# Patient Record
Sex: MALE | Race: BLACK OR AFRICAN AMERICAN
[De-identification: names, ages, dates, MRNs, and addresses within clinical notes are randomized per-mention and may not be internally consistent; named-entity substitution may affect disease eponyms.]

---

## 2018-05-25 ENCOUNTER — HOSPITAL ENCOUNTER (EMERGENCY)
Dept: HOSPITAL 61 - ER | Age: 78
Discharge: HOME | End: 2018-05-25
Payer: COMMERCIAL
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
## 2018-05-25 DIAGNOSIS — T75.4XXA: Primary | ICD-10-CM

## 2018-05-25 DIAGNOSIS — R20.2: ICD-10-CM

## 2018-05-25 PROCEDURE — 99283 EMERGENCY DEPT VISIT LOW MDM: CPT

## 2018-05-25 PROCEDURE — 93005 ELECTROCARDIOGRAM TRACING: CPT

## 2020-01-04 ENCOUNTER — HOSPITAL ENCOUNTER (INPATIENT)
Dept: HOSPITAL 61 - ER | Age: 80
LOS: 5 days | Discharge: HOME HEALTH | DRG: 291 | End: 2020-01-09
Attending: FAMILY MEDICINE | Admitting: FAMILY MEDICINE
Payer: COMMERCIAL

## 2020-01-04 VITALS — BODY MASS INDEX: 19.06 KG/M2 | HEIGHT: 71 IN | WEIGHT: 136.13 LBS

## 2020-01-04 VITALS — DIASTOLIC BLOOD PRESSURE: 93 MMHG | SYSTOLIC BLOOD PRESSURE: 166 MMHG

## 2020-01-04 VITALS — SYSTOLIC BLOOD PRESSURE: 156 MMHG | DIASTOLIC BLOOD PRESSURE: 89 MMHG

## 2020-01-04 DIAGNOSIS — J98.11: ICD-10-CM

## 2020-01-04 DIAGNOSIS — I42.8: ICD-10-CM

## 2020-01-04 DIAGNOSIS — I16.0: ICD-10-CM

## 2020-01-04 DIAGNOSIS — M19.90: ICD-10-CM

## 2020-01-04 DIAGNOSIS — Z87.891: ICD-10-CM

## 2020-01-04 DIAGNOSIS — E78.5: ICD-10-CM

## 2020-01-04 DIAGNOSIS — Z82.5: ICD-10-CM

## 2020-01-04 DIAGNOSIS — I25.5: ICD-10-CM

## 2020-01-04 DIAGNOSIS — J44.9: ICD-10-CM

## 2020-01-04 DIAGNOSIS — I48.91: ICD-10-CM

## 2020-01-04 DIAGNOSIS — I25.10: ICD-10-CM

## 2020-01-04 DIAGNOSIS — I70.0: ICD-10-CM

## 2020-01-04 DIAGNOSIS — I11.0: Primary | ICD-10-CM

## 2020-01-04 DIAGNOSIS — I50.43: ICD-10-CM

## 2020-01-04 DIAGNOSIS — Z95.0: ICD-10-CM

## 2020-01-04 DIAGNOSIS — Z85.028: ICD-10-CM

## 2020-01-04 DIAGNOSIS — Z82.49: ICD-10-CM

## 2020-01-04 DIAGNOSIS — I47.2: ICD-10-CM

## 2020-01-04 DIAGNOSIS — J96.01: ICD-10-CM

## 2020-01-04 DIAGNOSIS — I49.5: ICD-10-CM

## 2020-01-04 DIAGNOSIS — E83.51: ICD-10-CM

## 2020-01-04 LAB
ALBUMIN SERPL-MCNC: 3.2 G/DL (ref 3.4–5)
ALBUMIN/GLOB SERPL: 0.7 {RATIO} (ref 1–1.7)
ALP SERPL-CCNC: 122 U/L (ref 46–116)
ALT SERPL-CCNC: 22 U/L (ref 16–63)
ANION GAP SERPL CALC-SCNC: 12 MMOL/L (ref 6–14)
APTT BLD: 37 SEC (ref 24–38)
AST SERPL-CCNC: 30 U/L (ref 15–37)
BASOPHILS # BLD AUTO: 0 X10^3/UL (ref 0–0.2)
BASOPHILS NFR BLD: 1 % (ref 0–3)
BILIRUB SERPL-MCNC: 0.3 MG/DL (ref 0.2–1)
BUN SERPL-MCNC: 18 MG/DL (ref 8–26)
BUN/CREAT SERPL: 16 (ref 6–20)
CALCIUM SERPL-MCNC: 7.2 MG/DL (ref 8.5–10.1)
CHLORIDE SERPL-SCNC: 104 MMOL/L (ref 98–107)
CK SERPL-CCNC: 664 U/L (ref 39–308)
CO2 SERPL-SCNC: 26 MMOL/L (ref 21–32)
CREAT SERPL-MCNC: 1.1 MG/DL (ref 0.7–1.3)
EOSINOPHIL NFR BLD: 0 X10^3/UL (ref 0–0.7)
EOSINOPHIL NFR BLD: 1 % (ref 0–3)
ERYTHROCYTE [DISTWIDTH] IN BLOOD BY AUTOMATED COUNT: 16.1 % (ref 11.5–14.5)
GFR SERPLBLD BASED ON 1.73 SQ M-ARVRAT: 78.1 ML/MIN
GLOBULIN SER-MCNC: 4.4 G/DL (ref 2.2–3.8)
GLUCOSE SERPL-MCNC: 137 MG/DL (ref 70–99)
HCT VFR BLD CALC: 39.4 % (ref 39–53)
HGB BLD-MCNC: 12.5 G/DL (ref 13–17.5)
LIPASE: 78 U/L (ref 73–393)
LYMPHOCYTES # BLD: 0.7 X10^3/UL (ref 1–4.8)
LYMPHOCYTES NFR BLD AUTO: 14 % (ref 24–48)
MAGNESIUM SERPL-MCNC: 2 MG/DL (ref 1.8–2.4)
MCH RBC QN AUTO: 26 PG (ref 25–35)
MCHC RBC AUTO-ENTMCNC: 32 G/DL (ref 31–37)
MCV RBC AUTO: 82 FL (ref 79–100)
MONO #: 0.5 X10^3/UL (ref 0–1.1)
MONOCYTES NFR BLD: 10 % (ref 0–9)
NEUT #: 3.8 X10^3/UL (ref 1.8–7.7)
NEUTROPHILS NFR BLD AUTO: 74 % (ref 31–73)
PLATELET # BLD AUTO: 259 X10^3/UL (ref 140–400)
POTASSIUM SERPL-SCNC: 3.4 MMOL/L (ref 3.5–5.1)
PROT SERPL-MCNC: 7.6 G/DL (ref 6.4–8.2)
PROTHROMBIN TIME: 14.5 SEC (ref 11.7–14)
RBC # BLD AUTO: 4.83 X10^6/UL (ref 4.3–5.7)
SODIUM SERPL-SCNC: 142 MMOL/L (ref 136–145)
WBC # BLD AUTO: 5.1 X10^3/UL (ref 4–11)

## 2020-01-04 PROCEDURE — 94760 N-INVAS EAR/PLS OXIMETRY 1: CPT

## 2020-01-04 PROCEDURE — 82310 ASSAY OF CALCIUM: CPT

## 2020-01-04 PROCEDURE — 84443 ASSAY THYROID STIM HORMONE: CPT

## 2020-01-04 PROCEDURE — 83690 ASSAY OF LIPASE: CPT

## 2020-01-04 PROCEDURE — 83880 ASSAY OF NATRIURETIC PEPTIDE: CPT

## 2020-01-04 PROCEDURE — 85730 THROMBOPLASTIN TIME PARTIAL: CPT

## 2020-01-04 PROCEDURE — 82553 CREATINE MB FRACTION: CPT

## 2020-01-04 PROCEDURE — 83615 LACTATE (LD) (LDH) ENZYME: CPT

## 2020-01-04 PROCEDURE — 85610 PROTHROMBIN TIME: CPT

## 2020-01-04 PROCEDURE — 71045 X-RAY EXAM CHEST 1 VIEW: CPT

## 2020-01-04 PROCEDURE — 80061 LIPID PANEL: CPT

## 2020-01-04 PROCEDURE — 80048 BASIC METABOLIC PNL TOTAL CA: CPT

## 2020-01-04 PROCEDURE — 88305 TISSUE EXAM BY PATHOLOGIST: CPT

## 2020-01-04 PROCEDURE — 96374 THER/PROPH/DIAG INJ IV PUSH: CPT

## 2020-01-04 PROCEDURE — 87075 CULTR BACTERIA EXCEPT BLOOD: CPT

## 2020-01-04 PROCEDURE — 87071 CULTURE AEROBIC QUANT OTHER: CPT

## 2020-01-04 PROCEDURE — 32555 ASPIRATE PLEURA W/ IMAGING: CPT

## 2020-01-04 PROCEDURE — 84157 ASSAY OF PROTEIN OTHER: CPT

## 2020-01-04 PROCEDURE — 83986 ASSAY PH BODY FLUID NOS: CPT

## 2020-01-04 PROCEDURE — 94640 AIRWAY INHALATION TREATMENT: CPT

## 2020-01-04 PROCEDURE — 88112 CYTOPATH CELL ENHANCE TECH: CPT

## 2020-01-04 PROCEDURE — 84484 ASSAY OF TROPONIN QUANT: CPT

## 2020-01-04 PROCEDURE — 36415 COLL VENOUS BLD VENIPUNCTURE: CPT

## 2020-01-04 PROCEDURE — 80053 COMPREHEN METABOLIC PANEL: CPT

## 2020-01-04 PROCEDURE — 93306 TTE W/DOPPLER COMPLETE: CPT

## 2020-01-04 PROCEDURE — 93005 ELECTROCARDIOGRAM TRACING: CPT

## 2020-01-04 PROCEDURE — G0378 HOSPITAL OBSERVATION PER HR: HCPCS

## 2020-01-04 PROCEDURE — 83735 ASSAY OF MAGNESIUM: CPT

## 2020-01-04 PROCEDURE — 85025 COMPLETE CBC W/AUTO DIFF WBC: CPT

## 2020-01-04 PROCEDURE — 87116 MYCOBACTERIA CULTURE: CPT

## 2020-01-04 PROCEDURE — 83605 ASSAY OF LACTIC ACID: CPT

## 2020-01-04 RX ADMIN — CARVEDILOL SCH MG: 3.12 TABLET, FILM COATED ORAL at 20:19

## 2020-01-04 RX ADMIN — IPRATROPIUM BROMIDE AND ALBUTEROL SULFATE SCH ML: .5; 3 SOLUTION RESPIRATORY (INHALATION) at 20:41

## 2020-01-04 RX ADMIN — FUROSEMIDE SCH MG: 10 INJECTION, SOLUTION INTRAMUSCULAR; INTRAVENOUS at 14:00

## 2020-01-04 RX ADMIN — IPRATROPIUM BROMIDE AND ALBUTEROL SULFATE SCH ML: .5; 3 SOLUTION RESPIRATORY (INHALATION) at 15:19

## 2020-01-04 RX ADMIN — APIXABAN SCH MG: 2.5 TABLET, FILM COATED ORAL at 20:19

## 2020-01-04 RX ADMIN — LISINOPRIL SCH MG: 5 TABLET ORAL at 20:20

## 2020-01-04 RX ADMIN — ATORVASTATIN CALCIUM SCH MG: 40 TABLET, FILM COATED ORAL at 20:19

## 2020-01-04 RX ADMIN — TAMSULOSIN HYDROCHLORIDE SCH MG: 0.4 CAPSULE ORAL at 20:19

## 2020-01-04 NOTE — RAD
EXAM: CHEST ONE VIEW.

 

HISTORY: Shortness of breath, chest pain.

 

COMPARISON: 09/21/2005.

 

FINDINGS: A frontal view of the chest is obtained. A left-sided pacemaker 

has its leads in the right atrium and right ventricle.

 

There are moderate right and small left pleural effusions. The inspiration

is small. Interstitial infiltrates are consistent with mild pulmonary 

edema and atelectasis. There is no pneumothorax. The heart is not 

enlarged. There are atherosclerotic calcifications of the aorta. There are

surgical clips at the hiatus.

 

IMPRESSION: 

1. Moderate right and small left pleural effusions. Mild pulmonary edema.

 

Electronically signed by: MALIKA Zhang MD (1/4/2020 11:33 AM) [facility]

## 2020-01-04 NOTE — PHYS DOC
Past Medical History


Past Medical History:  Heart Disease, Hypertension, Other


Additional Past Medical Histor:  BRADYCARDIA


Past Surgical History:  Pacemaker, Other


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


Additional Information:  


Pt states he smokes about 1pk every month


Alcohol Use:  None


Drug Use:  None





Adult General


Chief Complaint


Chief Complaint:  SHORTNESS OF BREATH





HPI


HPI





Patient is a 79 year old male with past medical history of CAD and COPD who 

presents with one-week of increasing shortness of breath. Patient reports 

feeling like he can't lay flat and breathe well at night which has been ongoing 

for months. The patient denies any fevers, chills, nausea, vomiting at this 

time. He admits to one week of diarrhea that he believes began after eating a 

meal [place] night. He also admits to a dry cough that has become somewhat 

worse in the past week. He denies any chest pain, palpitations, dizziness, or 

lightheadedness. He is not on any oxygen at home and does not use an inhaler for

his shortness of breath. He believes the swelling in his legs is slightly worse 

than normal at this time





Review of Systems


Review of Systems


Constitutional: Denies fever or chills 


Eyes: Denies redness or eye pain 


HENT: Denies nasal congestion or sore throat


Respiratory: Reports cough and shortness of breath 


Cardiovascular: Denies chest pain or palpitations


GI: Reports mild abdominal pain, but no nausea, or vomiting


: Denies dysuria or hematuria


Musculoskeletal: Denies back pain or joint pain


Integument: Denies rash or skin lesions 


Neurologic: Denies headache, focal weakness or sensory changes





Complete systems were reviewed and found to be within normal limits, except as 

documented in this note.





Current Medications


Current Medications





Current Medications








 Medications


  (Trade)  Dose


 Ordered  Sig/Reyna  Start Time


 Stop Time Status Last Admin


Dose Admin


 


 Acetaminophen


  (Tylenol)  650 mg  PRN Q4HRS  PRN  1/4/20 12:45


 1/5/20 12:44   





 


 Albuterol/


 Ipratropium


  (Duoneb)  3 ml  1X  ONCE  1/4/20 11:30


 1/4/20 11:31 DC 1/4/20 11:29


3 ML


 


 Aspirin


  (Roz Aspirin)  325 mg  1X  ONCE  1/4/20 11:30


 1/4/20 11:31 DC 1/4/20 11:30


325 MG


 


 Furosemide


  (Lasix)  40 mg  1X  ONCE  1/4/20 12:30


 1/4/20 12:31 DC 1/4/20 12:48


40 MG


 


 Nitroglycerin


  (Nitro-Bid Oint)  0.5 inch  1X  ONCE  1/4/20 11:30


 1/4/20 11:31 DC 1/4/20 11:30


0.5 INCH


 


 Ondansetron HCl


  (Zofran)  4 mg  PRN Q8HRS  PRN  1/4/20 12:45


 1/5/20 12:44   





 


 Potassium Chloride


  (Klor-Con)  40 meq  1X  ONCE  1/4/20 12:30


 1/4/20 12:31 DC 1/4/20 12:48


40 MEQ











Allergies


Allergies





Allergies








Coded Allergies Type Severity Reaction Last Updated Verified


 


  No Known Drug Allergies    5/25/18 No











Physical Exam


Physical Exam


Constitutional: Thin, 78yo male in respiratory distress.


HENT: Normocephalic, atraumatic, oropharynx moist


Eyes: conjunctiva normal, no discharge


Cardiovascular: Heart rate normal, regular rhythm


Lungs & Thorax: Diffuse wheezing b/l. Crackles in lung bases b/l.


Abdomen: Soft, no tenderness. 


Skin: Warm, dry, no erythema, no rash


Extremities: No tenderness, ROM intact. Mild pitting edema in LE's b/l.


Neurologic: Alert and oriented X 3, normal motor function, normal sensory 

function, no focal deficits noted


Psychologic: Affect normal, judgement normal, mood normal





Current Patient Data


Vital Signs





                                   Vital Signs








  Date Time  Temp Pulse Resp B/P (MAP) Pulse Ox O2 Delivery O2 Flow Rate FiO2


 


1/4/20 12:45  82 26 198/90 (126) 95 Nasal Cannula 2.5 


 


1/4/20 11:15 97.7       





 97.7       








Lab Values





                                Laboratory Tests








Test


 1/4/20


11:12


 


White Blood Count


 5.1 x10^3/uL


(4.0-11.0)


 


Red Blood Count


 4.83 x10^6/uL


(4.30-5.70)


 


Hemoglobin


 12.5 g/dL


(13.0-17.5)  L


 


Hematocrit


 39.4 %


(39.0-53.0)


 


Mean Corpuscular Volume


 82 fL ()





 


Mean Corpuscular Hemoglobin 26 pg (25-35)  


 


Mean Corpuscular Hemoglobin


Concent 32 g/dL


(31-37)


 


Red Cell Distribution Width


 16.1 %


(11.5-14.5)  H


 


Platelet Count


 259 x10^3/uL


(140-400)


 


Neutrophils (%) (Auto) 74 % (31-73)  H


 


Lymphocytes (%) (Auto) 14 % (24-48)  L


 


Monocytes (%) (Auto) 10 % (0-9)  H


 


Eosinophils (%) (Auto) 1 % (0-3)  


 


Basophils (%) (Auto) 1 % (0-3)  


 


Neutrophils # (Auto)


 3.8 x10^3/uL


(1.8-7.7)


 


Lymphocytes # (Auto)


 0.7 x10^3/uL


(1.0-4.8)  L


 


Monocytes # (Auto)


 0.5 x10^3/uL


(0.0-1.1)


 


Eosinophils # (Auto)


 0.0 x10^3/uL


(0.0-0.7)


 


Basophils # (Auto)


 0.0 x10^3/uL


(0.0-0.2)


 


Prothrombin Time


 14.5 SEC


(11.7-14.0)  H


 


Prothrombin Time INR


 1.2 (0.8-1.1)


H


 


Activated Partial


Thromboplast Time 37 SEC (24-38)





 


Sodium Level


 142 mmol/L


(136-145)


 


Potassium Level


 3.4 mmol/L


(3.5-5.1)  L


 


Chloride Level


 104 mmol/L


()


 


Carbon Dioxide Level


 26 mmol/L


(21-32)


 


Anion Gap 12 (6-14)  


 


Blood Urea Nitrogen


 18 mg/dL


(8-26)


 


Creatinine


 1.1 mg/dL


(0.7-1.3)


 


Estimated GFR


(Cockcroft-Gault) 78.1  





 


BUN/Creatinine Ratio 16 (6-20)  


 


Glucose Level


 137 mg/dL


(70-99)  H


 


Lactic Acid Level


 2.0 mmol/L


(0.4-2.0)


 


Calcium Level


 7.2 mg/dL


(8.5-10.1)  L


 


Magnesium Level


 2.0 mg/dL


(1.8-2.4)


 


Total Bilirubin


 0.3 mg/dL


(0.2-1.0)


 


Aspartate Amino Transferase


(AST) 30 U/L (15-37)





 


Alanine Aminotransferase (ALT)


 22 U/L (16-63)





 


Alkaline Phosphatase


 122 U/L


()  H


 


Creatine Kinase


 664 U/L


()  H


 


Creatine Kinase MB (Mass)


 7.1 ng/mL


(0.0-3.6)  H


 


Creatine Kinase MB Relative


Index 1.1 % (0-4)  





 


Troponin I Quantitative


 < 0.017 ng/mL


(0.000-0.055)


 


NT-Pro-B-Type Natriuretic


Peptide 83383 pg/mL


(0-449)  H


 


Total Protein


 7.6 g/dL


(6.4-8.2)


 


Albumin


 3.2 g/dL


(3.4-5.0)  L


 


Albumin/Globulin Ratio


 0.7 (1.0-1.7)


L


 


Lipase


 78 U/L


()





                                Laboratory Tests


1/4/20 11:12








                                Laboratory Tests


1/4/20 11:12











EKG


EKG


01/04/2020 @11:01 shows Sinus rhythm with Irregular rhythm at 88bpm. No ST 

elevations, depressions present. No significant differences when compared with 

prior EKG obtained on 05/25/3018.[]





Radiology/Procedures


Radiology/Procedures


PROCEDURE: CHEST AP ONLY





EXAM: CHEST ONE VIEW.


 


HISTORY: Shortness of breath, chest pain.


 


COMPARISON: 09/21/2005.


 


FINDINGS: A frontal view of the chest is obtained. A left-sided pacemaker 


has its leads in the right atrium and right ventricle.


 


There are moderate right and small left pleural effusions. The inspiration


is small. Interstitial infiltrates are consistent with mild pulmonary 


edema and atelectasis. There is no pneumothorax. The heart is not 


enlarged. There are atherosclerotic calcifications of the aorta. There are


surgical clips at the hiatus.


 


IMPRESSION: 


1. Moderate right and small left pleural effusions. Mild pulmonary edema.


 


Electronically signed by: MALIKA Zhang MD (1/4/2020 11:33 AM) [facility]





Course & Med Decision Making


Course & Med Decision Making


Patient is a 79-year-old male with past medical history of COPD, and CAD who 

presents with shortness of breath ongoing for 1 month. The patient was in tripod

 position and satting around 88% oxygen on room air upon arrival. Here he states

 that he is not on any oxygen at home and does not take any medications for his 

COPD. He is not complaining of any chest pain but does admit to slightly 

increased lower extremity edema. Breathing treatment was administered, along 

with dexamethasone 10mg. Chest x-ray showed pleural effusions and moderate 

amounts of fluid buildup, due to CHF. EKG was not concerning. Labs showed 

hypokalemia. Once creatinine was found to be normal, IV Lasix  was started. 

Breathing improved somewhat with duoneb and steroids, but was still subjectively

 SOB after these interventions. Patient requiring admission for further 

evaluation and treatment. Discussed with Dr. Fong who is in agreement with 

admission. Discussed findings and plan with patient and family, who acknowledge 

understanding and agreement.





Dragon Disclaimer


Dragon Disclaimer


This electronic medical record was generated, in whole or in part, using a voice

 recognition dictation system.





Departure


Departure


Impression:  


   Primary Impression:  


   CHF exacerbation


   Additional Impressions:  


   Hypoxia


   Hypokalemia


Disposition:  09 ADMITTED AS INPATIENT


Admitting Physician:  EHSAN (Ernesto Fong)


Condition:  GUARDED


Referrals:  


UNKNOWN PCP NAME (PCP)





Problem Qualifiers








   Primary Impression:  


   CHF exacerbation


   Heart failure type:  unspecified  Qualified Codes:  I50.9 - Heart failure, 

   unspecified








DEEDEE MOCK DO              Jan 4, 2020 11:33

## 2020-01-04 NOTE — EKG
[facility]

              [address] [place] [address]

Test Date:    2020               Test Time:    11:01:24

Pat Name:     ABHIJIT GILBERT       Department:   

Patient ID:   [id number]           Room:          

Gender:       M                        Technician:   

:          1940               Requested By: DEEDEE MOCK

Order Number: [id number]           Reading MD:     

                                 Measurements

Intervals                              Axis          

Rate:         88                       P:            

NV:                                    QRS:          68

QRSD:         82                       T:            -152

QT:           402                                    

QTc:          490                                    

                           Interpretive Statements

IRREGULAR RHYTHM, NO P-WAVE FOUND

LVH WITH REPOLARIZATION ABNORMALITY

PROLONGED QT

ABNORMAL ECG

RI6.01

No previous ECG available for comparison

## 2020-01-04 NOTE — PDOC1
History and Physical


Date of Admission


Date of Admission


DATE: 20 


TIME: 13:04





Identification/Chief Complaint


Chief Complaint


 seen in er  in mild distress, leaning ford to catch breath 79 year old male 

with past medical history of CAD and COPD who presents with one-week of 

increasing shortness of breath





. Patient reports feeling like he can't lay flat and breathe well at night which

has been ongoing for months. The patient denies any fevers, chills, nausea, 

vomiting at this time. He admits to one week of diarrhea that he believes began 

after eating a meal [place] night.





 He also admits to a dry cough that has become somewhat worse in the past week. 





denies any chest pain, palpitations, dizziness, or lightheadedness. He is not on

any oxygen at home and does not use an inhaler for his shortness of breath. He 

believes the swelling in his legs is slightly worse than normal at this time





Past Medical History


Past Medical History


                                                            


 Past Medical History 


Past Medical History


Past Medical History:  Heart Disease, Hypertension, Other


Additional Past Medical Histor:  BRADYCARDIA


Past Surgical History:  Pacemaker, Other


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


Additional Information:  


Pt states he smokes about 1pk every month,  stopped in 2019


Alcohol Use:  None


Drug Use:  None








FHX HTN


Cardiovascular:  CAD, CHF


Pulmonary:  COPD


Musculoskeletal:  Osteoarthritis





Family History


Family History:  High Cholestrol, Hypertension





Social History


Smoke:  Quit


ALCOHOL:  none


Drugs:  None





Current Problem List


Problem List


Problems


Medical Problems:


(1) CHF exacerbation


Status: Acute  





(2) Hypokalemia


Status: Acute  





(3) Hypoxia


Status: Acute  











Current Medications


Current Medications





Current Medications


Aspirin (Roz Aspirin) 325 mg 1X  ONCE PO  Last administered on 20at 11:30;

 Start 20 at 11:30;  Stop 20 at 11:31;  Status DC


Nitroglycerin (Nitro-Bid Oint) 0.5 inch 1X  ONCE TP  Last administered on 

20at 11:30;  Start 20 at 11:30;  Stop 20 at 11:31;  Status DC


Albuterol/ Ipratropium (Duoneb) 3 ml 1X  ONCE NEB  Last administered on 20at

11:29;  Start 20 at 11:30;  Stop 20 at 11:31;  Status DC


Furosemide (Lasix) 40 mg 1X  ONCE IVP  Last administered on 20at 12:48;  

Start 20 at 12:30;  Stop 20 at 12:31;  Status DC


Potassium Chloride (Klor-Con) 40 meq 1X  ONCE PO  Last administered on 20at 

12:48;  Start 20 at 12:30;  Stop 20 at 12:31;  Status DC


Ondansetron HCl (Zofran) 4 mg PRN Q8HRS  PRN IV NAUSEA/VOMITING;  Start 20 

at 12:45;  Stop 20 at 12:44


Acetaminophen (Tylenol) 650 mg PRN Q4HRS  PRN PO FEVER;  Start 20 at 12:45; 

Stop 20 at 12:44





Allergies


Allergies:  


Coded Allergies:  


     No Known Drug Allergies (Unverified , 18)





ROS


Review of System





Review of Systems


Review of Systems


Constitutional: Denies fever or chills 


Eyes: Denies redness or eye pain 


HENT: Denies nasal congestion or sore throat


Respiratory: Reports cough and shortness of breath 


Cardiovascular: Denies chest pain or palpitations


GI: Reports mild abdominal pain, but no nausea, or vomiting


: Denies dysuria or hematuria


Musculoskeletal: Denies back pain or joint pain


Integument: Denies rash or skin lesions 


Neurologic: Denies headache, focal weakness or sensory changes





14 pt systems were reviewed and found to be within normal limits, except as 

documented .


General:  YES: Fatigue


ALLERGY AND IMMUNOLOGY:  No: Hives, Insect Bite Sensitivity, Itchy/Watery Eyes, 

Nasal Congestion, Post Nasal Drip, Seasonal Allergies, Other


Gastrointestinal:  Yes Diarrhea





Physical Exam


Physical Exam





Physical Exam


Physical Exam


Constitutional: Thin, 78yo male in mild  respiratory distress.


HENT: Normocephalic, atraumatic, oropharynx moist


Eyes: conjunctiva normal, no discharge


Cardiovascular: Heart rate normal, regular rhythm


Lungs & Thorax: Diffuse wheezing b/l. Crackles in lung bases b/l.


Abdomen: Soft, no tenderness. 


Skin: Warm, dry, no erythema, no rash


Extremities: No tenderness, ROM intact. Mild pitting edema in LE's b/l.


Neurologic: Alert and oriented X 3, normal motor function, normal sensory 

function, no focal deficits noted


Psychologic: Affect normal, judgment normal, mood normal


General:  Alert, Oriented X3, Cooperative, mild distress


Heart:  RRR


Breasts:  Not examined


Abdomen:  Normal bowel sounds, Soft


Rectal Exam:  not examined


PELVIC:  Examination not indicated


Extremities:  No cyanosis


Neuro:  Normal speech, Cranial nerves 3-12 NL


Psych/Mental Status:  Mental status NL, Mood NL





Vitals


Vitals





Vital Signs








  Date Time  Temp Pulse Resp B/P (MAP) Pulse Ox O2 Delivery O2 Flow Rate FiO2


 


20 12:45  82 26 198/90 (126) 95 Nasal Cannula 2.5 


 


20 11:15 97.7       





 97.7       











Labs


Labs





Laboratory Tests








Test


 20


11:12


 


White Blood Count


 5.1 x10^3/uL


(4.0-11.0)


 


Red Blood Count


 4.83 x10^6/uL


(4.30-5.70)


 


Hemoglobin


 12.5 g/dL


(13.0-17.5)


 


Hematocrit


 39.4 %


(39.0-53.0)


 


Mean Corpuscular Volume 82 fL () 


 


Mean Corpuscular Hemoglobin 26 pg (25-35) 


 


Mean Corpuscular Hemoglobin


Concent 32 g/dL


(31-37)


 


Red Cell Distribution Width


 16.1 %


(11.5-14.5)


 


Platelet Count


 259 x10^3/uL


(140-400)


 


Neutrophils (%) (Auto) 74 % (31-73) 


 


Lymphocytes (%) (Auto) 14 % (24-48) 


 


Monocytes (%) (Auto) 10 % (0-9) 


 


Eosinophils (%) (Auto) 1 % (0-3) 


 


Basophils (%) (Auto) 1 % (0-3) 


 


Neutrophils # (Auto)


 3.8 x10^3/uL


(1.8-7.7)


 


Lymphocytes # (Auto)


 0.7 x10^3/uL


(1.0-4.8)


 


Monocytes # (Auto)


 0.5 x10^3/uL


(0.0-1.1)


 


Eosinophils # (Auto)


 0.0 x10^3/uL


(0.0-0.7)


 


Basophils # (Auto)


 0.0 x10^3/uL


(0.0-0.2)


 


Prothrombin Time


 14.5 SEC


(11.7-14.0)


 


Prothromb Time International


Ratio 1.2 (0.8-1.1) 





 


Activated Partial


Thromboplast Time 37 SEC (24-38) 





 


Sodium Level


 142 mmol/L


(136-145)


 


Potassium Level


 3.4 mmol/L


(3.5-5.1)


 


Chloride Level


 104 mmol/L


()


 


Carbon Dioxide Level


 26 mmol/L


(21-32)


 


Anion Gap 12 (6-14) 


 


Blood Urea Nitrogen


 18 mg/dL


(8-26)


 


Creatinine


 1.1 mg/dL


(0.7-1.3)


 


Estimated GFR


(Cockcroft-Gault) 78.1 





 


BUN/Creatinine Ratio 16 (6-20) 


 


Glucose Level


 137 mg/dL


(70-99)


 


Lactic Acid Level


 2.0 mmol/L


(0.4-2.0)


 


Calcium Level


 7.2 mg/dL


(8.5-10.1)


 


Magnesium Level


 2.0 mg/dL


(1.8-2.4)


 


Total Bilirubin


 0.3 mg/dL


(0.2-1.0)


 


Aspartate Amino Transf


(AST/SGOT) 30 U/L (15-37) 





 


Alanine Aminotransferase


(ALT/SGPT) 22 U/L (16-63) 





 


Alkaline Phosphatase


 122 U/L


()


 


Creatine Kinase


 664 U/L


()


 


Creatine Kinase MB (Mass)


 7.1 ng/mL


(0.0-3.6)


 


Creatine Kinase MB Relative


Index 1.1 % (0-4) 





 


Troponin I Quantitative


 < 0.017 ng/mL


(0.000-0.055)


 


NT-Pro-B-Type Natriuretic


Peptide 33620 pg/mL


(0-449)


 


Total Protein


 7.6 g/dL


(6.4-8.2)


 


Albumin


 3.2 g/dL


(3.4-5.0)


 


Albumin/Globulin Ratio 0.7 (1.0-1.7) 


 


Lipase


 78 U/L


()








Laboratory Tests








Test


 20


11:12


 


White Blood Count


 5.1 x10^3/uL


(4.0-11.0)


 


Red Blood Count


 4.83 x10^6/uL


(4.30-5.70)


 


Hemoglobin


 12.5 g/dL


(13.0-17.5)


 


Hematocrit


 39.4 %


(39.0-53.0)


 


Mean Corpuscular Volume 82 fL () 


 


Mean Corpuscular Hemoglobin 26 pg (25-35) 


 


Mean Corpuscular Hemoglobin


Concent 32 g/dL


(31-37)


 


Red Cell Distribution Width


 16.1 %


(11.5-14.5)


 


Platelet Count


 259 x10^3/uL


(140-400)


 


Neutrophils (%) (Auto) 74 % (31-73) 


 


Lymphocytes (%) (Auto) 14 % (24-48) 


 


Monocytes (%) (Auto) 10 % (0-9) 


 


Eosinophils (%) (Auto) 1 % (0-3) 


 


Basophils (%) (Auto) 1 % (0-3) 


 


Neutrophils # (Auto)


 3.8 x10^3/uL


(1.8-7.7)


 


Lymphocytes # (Auto)


 0.7 x10^3/uL


(1.0-4.8)


 


Monocytes # (Auto)


 0.5 x10^3/uL


(0.0-1.1)


 


Eosinophils # (Auto)


 0.0 x10^3/uL


(0.0-0.7)


 


Basophils # (Auto)


 0.0 x10^3/uL


(0.0-0.2)


 


Prothrombin Time


 14.5 SEC


(11.7-14.0)


 


Prothromb Time International


Ratio 1.2 (0.8-1.1) 





 


Activated Partial


Thromboplast Time 37 SEC (24-38) 





 


Sodium Level


 142 mmol/L


(136-145)


 


Potassium Level


 3.4 mmol/L


(3.5-5.1)


 


Chloride Level


 104 mmol/L


()


 


Carbon Dioxide Level


 26 mmol/L


(21-32)


 


Anion Gap 12 (6-14) 


 


Blood Urea Nitrogen


 18 mg/dL


(8-26)


 


Creatinine


 1.1 mg/dL


(0.7-1.3)


 


Estimated GFR


(Cockcroft-Gault) 78.1 





 


BUN/Creatinine Ratio 16 (6-20) 


 


Glucose Level


 137 mg/dL


(70-99)


 


Lactic Acid Level


 2.0 mmol/L


(0.4-2.0)


 


Calcium Level


 7.2 mg/dL


(8.5-10.1)


 


Magnesium Level


 2.0 mg/dL


(1.8-2.4)


 


Total Bilirubin


 0.3 mg/dL


(0.2-1.0)


 


Aspartate Amino Transf


(AST/SGOT) 30 U/L (15-37) 





 


Alanine Aminotransferase


(ALT/SGPT) 22 U/L (16-63) 





 


Alkaline Phosphatase


 122 U/L


()


 


Creatine Kinase


 664 U/L


()


 


Creatine Kinase MB (Mass)


 7.1 ng/mL


(0.0-3.6)


 


Creatine Kinase MB Relative


Index 1.1 % (0-4) 





 


Troponin I Quantitative


 < 0.017 ng/mL


(0.000-0.055)


 


NT-Pro-B-Type Natriuretic


Peptide 32200 pg/mL


(0-449)


 


Total Protein


 7.6 g/dL


(6.4-8.2)


 


Albumin


 3.2 g/dL


(3.4-5.0)


 


Albumin/Globulin Ratio 0.7 (1.0-1.7) 


 


Lipase


 78 U/L


()











Images


Images





PATIENT: KIARRA GILBERTOUNT: [id number]     MRN#: [id number]


: 1940           LOCATION: ER              AGE: 79


SEX: M                    EXAM DT: 20         ACCESSION#: [id number]


STATUS: PRE ER            ORD. PHYSICIAN: DEEDEE MOCK DO


REASON: SOA, chest pain


PROCEDURE: CHEST AP ONLY





EXAM: CHEST ONE VIEW.


 


HISTORY: Shortness of breath, chest pain.


 


COMPARISON: 2005.


 


FINDINGS: A frontal view of the chest is obtained. A left-sided pacemaker 


has its leads in the right atrium and right ventricle.


 


There are moderate right and small left pleural effusions. The inspiration


is small. Interstitial infiltrates are consistent with mild pulmonary 


edema and atelectasis. There is no pneumothorax. The heart is not 


enlarged. There are atherosclerotic calcifications of the aorta. There are


surgical clips at the hiatus.


 


IMPRESSION: 


1. Moderate right and small left pleural effusions. Mild pulmonary edema.


 


Electronically signed by: MALIKA Zhang MD (2020 11:33 AM) [facility]














DICTATED and SIGNED BY:     SHAMIKA ZHANG MD


DATE:     20 1133





VTE Prophylaxis Ordered


VTE Prophylaxis Devices:  No


VTE Pharmacological Prophylaxi:  Yes





Assessment/Plan


Assessment/Plan


 


IMPRESSION: 








1. Moderate right and small left pleural effusions. Mild pulmonary edema.


2. Acute hypoxic resp failure


3. COPD


4. HYPERTENSIVE URGENCY


5, PACEMAKER PLACEMENT, REMOTE











PLAN





ADMIT


CONSULT CARDIOLOGY


CONSULT PULM


DVT PROPHYLAXIS


IV DIURESIS, LASIX 40MG IV BID


IV HYDRALAZINE PRN BP SUPPORT


ECHO


TSH











HERMINIO WILKERSON MD           2020 13:06

## 2020-01-05 VITALS — DIASTOLIC BLOOD PRESSURE: 84 MMHG | SYSTOLIC BLOOD PRESSURE: 160 MMHG

## 2020-01-05 VITALS — SYSTOLIC BLOOD PRESSURE: 154 MMHG | DIASTOLIC BLOOD PRESSURE: 79 MMHG

## 2020-01-05 VITALS — SYSTOLIC BLOOD PRESSURE: 135 MMHG | DIASTOLIC BLOOD PRESSURE: 98 MMHG

## 2020-01-05 VITALS — DIASTOLIC BLOOD PRESSURE: 79 MMHG | SYSTOLIC BLOOD PRESSURE: 156 MMHG

## 2020-01-05 VITALS — DIASTOLIC BLOOD PRESSURE: 92 MMHG | SYSTOLIC BLOOD PRESSURE: 157 MMHG

## 2020-01-05 VITALS — DIASTOLIC BLOOD PRESSURE: 84 MMHG | SYSTOLIC BLOOD PRESSURE: 139 MMHG

## 2020-01-05 LAB
ALBUMIN SERPL-MCNC: 2.9 G/DL (ref 3.4–5)
ALBUMIN/GLOB SERPL: 0.9 {RATIO} (ref 1–1.7)
ALP SERPL-CCNC: 104 U/L (ref 46–116)
ALT SERPL-CCNC: 22 U/L (ref 16–63)
ANION GAP SERPL CALC-SCNC: 7 MMOL/L (ref 6–14)
AST SERPL-CCNC: 26 U/L (ref 15–37)
BASOPHILS # BLD AUTO: 0 X10^3/UL (ref 0–0.2)
BASOPHILS NFR BLD: 0 % (ref 0–3)
BILIRUB SERPL-MCNC: 0.3 MG/DL (ref 0.2–1)
BUN SERPL-MCNC: 18 MG/DL (ref 8–26)
BUN/CREAT SERPL: 18 (ref 6–20)
CALCIUM SERPL-MCNC: 7 MG/DL (ref 8.5–10.1)
CHLORIDE SERPL-SCNC: 105 MMOL/L (ref 98–107)
CHOLEST SERPL-MCNC: 148 MG/DL (ref 0–200)
CHOLEST/HDLC SERPL: 2.3 {RATIO}
CO2 SERPL-SCNC: 29 MMOL/L (ref 21–32)
CREAT SERPL-MCNC: 1 MG/DL (ref 0.7–1.3)
EOSINOPHIL NFR BLD: 0 X10^3/UL (ref 0–0.7)
EOSINOPHIL NFR BLD: 1 % (ref 0–3)
ERYTHROCYTE [DISTWIDTH] IN BLOOD BY AUTOMATED COUNT: 16.1 % (ref 11.5–14.5)
GFR SERPLBLD BASED ON 1.73 SQ M-ARVRAT: 87.2 ML/MIN
GLOBULIN SER-MCNC: 3.2 G/DL (ref 2.2–3.8)
GLUCOSE SERPL-MCNC: 124 MG/DL (ref 70–99)
HCT VFR BLD CALC: 34.8 % (ref 39–53)
HDLC SERPL-MCNC: 63 MG/DL (ref 40–60)
HGB BLD-MCNC: 10.9 G/DL (ref 13–17.5)
LDLC: 75 MG/DL (ref 0–100)
LYMPHOCYTES # BLD: 0.5 X10^3/UL (ref 1–4.8)
LYMPHOCYTES NFR BLD AUTO: 9 % (ref 24–48)
MCH RBC QN AUTO: 26 PG (ref 25–35)
MCHC RBC AUTO-ENTMCNC: 31 G/DL (ref 31–37)
MCV RBC AUTO: 81 FL (ref 79–100)
MONO #: 0.6 X10^3/UL (ref 0–1.1)
MONOCYTES NFR BLD: 11 % (ref 0–9)
NEUT #: 4.2 X10^3/UL (ref 1.8–7.7)
NEUTROPHILS NFR BLD AUTO: 79 % (ref 31–73)
PLATELET # BLD AUTO: 224 X10^3/UL (ref 140–400)
POTASSIUM SERPL-SCNC: 4 MMOL/L (ref 3.5–5.1)
PROT SERPL-MCNC: 6.1 G/DL (ref 6.4–8.2)
RBC # BLD AUTO: 4.27 X10^6/UL (ref 4.3–5.7)
SODIUM SERPL-SCNC: 141 MMOL/L (ref 136–145)
TRIGL SERPL-MCNC: 51 MG/DL (ref 0–150)
VLDLC: 10 MG/DL (ref 0–40)
WBC # BLD AUTO: 5.3 X10^3/UL (ref 4–11)

## 2020-01-05 RX ADMIN — FUROSEMIDE SCH MG: 10 INJECTION, SOLUTION INTRAMUSCULAR; INTRAVENOUS at 14:07

## 2020-01-05 RX ADMIN — IPRATROPIUM BROMIDE AND ALBUTEROL SULFATE SCH ML: .5; 3 SOLUTION RESPIRATORY (INHALATION) at 00:00

## 2020-01-05 RX ADMIN — CARVEDILOL SCH MG: 3.12 TABLET, FILM COATED ORAL at 08:08

## 2020-01-05 RX ADMIN — IPRATROPIUM BROMIDE AND ALBUTEROL SULFATE SCH ML: .5; 3 SOLUTION RESPIRATORY (INHALATION) at 04:28

## 2020-01-05 RX ADMIN — Medication SCH MG: at 08:08

## 2020-01-05 RX ADMIN — PANTOPRAZOLE SODIUM SCH MG: 40 TABLET, DELAYED RELEASE ORAL at 08:08

## 2020-01-05 RX ADMIN — POTASSIUM CHLORIDE SCH MEQ: 1500 TABLET, EXTENDED RELEASE ORAL at 08:08

## 2020-01-05 RX ADMIN — IPRATROPIUM BROMIDE AND ALBUTEROL SULFATE SCH ML: .5; 3 SOLUTION RESPIRATORY (INHALATION) at 23:13

## 2020-01-05 RX ADMIN — TAMSULOSIN HYDROCHLORIDE SCH MG: 0.4 CAPSULE ORAL at 20:45

## 2020-01-05 RX ADMIN — ATORVASTATIN CALCIUM SCH MG: 40 TABLET, FILM COATED ORAL at 20:45

## 2020-01-05 RX ADMIN — IPRATROPIUM BROMIDE AND ALBUTEROL SULFATE SCH ML: .5; 3 SOLUTION RESPIRATORY (INHALATION) at 12:01

## 2020-01-05 RX ADMIN — FUROSEMIDE SCH MG: 10 INJECTION, SOLUTION INTRAMUSCULAR; INTRAVENOUS at 08:07

## 2020-01-05 RX ADMIN — FOLIC ACID SCH MG: 1 TABLET ORAL at 08:09

## 2020-01-05 RX ADMIN — CARVEDILOL SCH MG: 3.12 TABLET, FILM COATED ORAL at 18:32

## 2020-01-05 RX ADMIN — IPRATROPIUM BROMIDE AND ALBUTEROL SULFATE SCH ML: .5; 3 SOLUTION RESPIRATORY (INHALATION) at 21:00

## 2020-01-05 RX ADMIN — APIXABAN SCH MG: 2.5 TABLET, FILM COATED ORAL at 20:45

## 2020-01-05 RX ADMIN — IPRATROPIUM BROMIDE AND ALBUTEROL SULFATE SCH ML: .5; 3 SOLUTION RESPIRATORY (INHALATION) at 15:56

## 2020-01-05 RX ADMIN — LISINOPRIL SCH MG: 5 TABLET ORAL at 08:09

## 2020-01-05 RX ADMIN — IPRATROPIUM BROMIDE AND ALBUTEROL SULFATE SCH ML: .5; 3 SOLUTION RESPIRATORY (INHALATION) at 06:21

## 2020-01-05 RX ADMIN — TAMSULOSIN HYDROCHLORIDE SCH MG: 0.4 CAPSULE ORAL at 08:08

## 2020-01-05 RX ADMIN — APIXABAN SCH MG: 2.5 TABLET, FILM COATED ORAL at 08:11

## 2020-01-05 RX ADMIN — MAGNESIUM OXIDE TAB 400 MG (241.3 MG ELEMENTAL MG) SCH MG: 400 (241.3 MG) TAB at 08:08

## 2020-01-05 NOTE — PDOC2
CONSULT


Date of Consult


Date of Consult


DATE: 1/5/20 


TIME: 11:54





Reason for Consult


Reason for Consult:


Congestive heart failure





Referring Physician


Referring Physician:


Dr. Fong





Identification/Chief Complaint


Chief Complaint


Shortness of breath





Source


Source:  Chart review, Patient





History of Present Illness


Reason for Visit:


79-year-old male presented complaining of one-week history of progressive 

shortness of breath. He also complained of orthopnea but denied any chest pain, 

palpitations or syncope. He has history of permanent pacemaker implantation but 

does not remember where this was done and who his cardiologist is.





Past Medical History


Cardiovascular:  CAD, CHF


Pulmonary:  COPD


Musculoskeletal:  Osteoarthritis





Family History


Family History:  High Cholestrol, Hypertension





Social History


Quit


ALCOHOL:  none


Drugs:  None





Current Problem List


Problem List


Problems


Medical Problems:


(1) CHF exacerbation


Status: Acute  





(2) Hypokalemia


Status: Acute  





(3) Hypoxia


Status: Acute  











Current Medications


Current Medications





Current Medications


Aspirin (Roz Aspirin) 325 mg 1X  ONCE PO  Last administered on 1/4/20at 11:30;

 Start 1/4/20 at 11:30;  Stop 1/4/20 at 11:31;  Status DC


Nitroglycerin (Nitro-Bid Oint) 0.5 inch 1X  ONCE TP  Last administered on 

1/4/20at 11:30;  Start 1/4/20 at 11:30;  Stop 1/4/20 at 11:31;  Status DC


Albuterol/ Ipratropium (Duoneb) 3 ml 1X  ONCE NEB  Last administered on 1/4/20at

11:29;  Start 1/4/20 at 11:30;  Stop 1/4/20 at 11:31;  Status DC


Furosemide (Lasix) 40 mg 1X  ONCE IVP  Last administered on 1/4/20at 12:48;  

Start 1/4/20 at 12:30;  Stop 1/4/20 at 12:31;  Status DC


Potassium Chloride (Klor-Con) 40 meq 1X  ONCE PO  Last administered on 1/4/20at 

12:48;  Start 1/4/20 at 12:30;  Stop 1/4/20 at 12:31;  Status DC


Ondansetron HCl (Zofran) 4 mg PRN Q8HRS  PRN IV NAUSEA/VOMITING;  Start 1/4/20 

at 12:45;  Stop 1/4/20 at 13:45;  Status DC


Acetaminophen (Tylenol) 650 mg PRN Q4HRS  PRN PO FEVER;  Start 1/4/20 at 12:45; 

Stop 1/4/20 at 13:45;  Status DC


Sodium Chloride (Normal Saline Flush) 3 ml QSHIFT  PRN IV AFTER MEDS AND BLOOD 

DRAWS;  Start 1/4/20 at 13:45


Ondansetron HCl (Zofran) 4 mg PRN Q4HRS  PRN IV NAUSEA/VOMITING;  Start 1/4/20 

at 13:45


Acetaminophen (Tylenol) 650 mg PRN Q4HRS  PRN PO TEMP OVER 100.4F OR MILD PAIN; 

Start 1/4/20 at 13:45


Al Hydroxide/Mg Hydroxide (Mylanta Plus Xs) 30 ml PRN DAILY  PRN PO HEARTBURN / 

GAS;  Start 1/4/20 at 13:45


Clonidine HCl (Catapres) 0.1 mg PRN Q6HRS  PRN PO SBP>160 OR DBP>90;  Start 

1/4/20 at 13:45


Docusate Sodium (Colace) 100 mg PRN BID  PRN PO CONSTIPATION;  Start 1/4/20 at 

13:45


Albuterol/ Ipratropium (Duoneb) 3 ml Q4H NEB  Last administered on 1/5/20at 

04:28;  Start 1/4/20 at 16:00


Guaifenesin (Robitussin) 200 mg PRN Q4HRS  PRN PO COUGH;  Start 1/4/20 at 13:45


Lorazepam (Ativan) 0.5 mg PRN Q4HRS  PRN PO ANXIETY / AGITATION;  Start 1/4/20 

at 13:45


Enoxaparin Sodium (Lovenox 40mg Syringe) 40 mg DAILY SQ ;  Start 1/5/20 at 

09:00;  Stop 1/4/20 at 15:50;  Status DC


Furosemide (Lasix) 40 mg BID92 IVP  Last administered on 1/5/20at 08:07;  Start 

1/4/20 at 14:00


Potassium Chloride (Klor-Con) 40 meq 1X  ONCE PO ;  Start 1/4/20 at 14:15;  Stop

1/4/20 at 14:16;  Status DC


Potassium Chloride (Klor-Con) 20 meq DAILY PO  Last administered on 1/5/20at 

08:08;  Start 1/5/20 at 09:00


Hydralazine HCl (Apresoline Inj) 10 mg PRN Q4HRS  PRN IVP ELEVATED BP, SEE 

COMMENTS;  Start 1/4/20 at 14:00


Lisinopril (Prinivil) 5 mg BID PO ;  Start 1/4/20 at 21:00;  Stop 1/4/20 at 

15:50;  Status DC


Apixaban (Eliquis) 2.5 mg Q12HR PO  Last administered on 1/5/20at 08:11;  Start 

1/4/20 at 21:00


Atorvastatin Calcium (Lipitor) 40 mg QHS PO  Last administered on 1/4/20at 

20:19;  Start 1/4/20 at 21:00


Carvedilol (Coreg) 3.125 mg BIDWMEALS PO ;  Start 1/5/20 at 21:00;  Stop 1/4/20 

at 19:11;  Status DC


Furosemide (Lasix) 40 mg BID PO ;  Start 1/4/20 at 21:00;  Stop 1/4/20 at 19:16;

 Status DC


Tamsulosin HCl (Flomax) 0.4 mg BID PO  Last administered on 1/5/20at 08:08;  

Start 1/4/20 at 21:00


Thiamine Mononitrate (Vitamin B-1) 100 mg DAILY PO  Last administered on 

1/5/20at 08:08;  Start 1/5/20 at 09:00


Ergocalciferol (Vitamin D2) 50,000 unit WEEKLY PO ;  Start 1/11/20 at 09:00


Folic Acid (Folic Acid) 1 mg DAILY PO  Last administered on 1/5/20at 08:09;  

Start 1/5/20 at 09:00


Magnesium Oxide (Magnesium Oxide) 400 mg DAILY PO  Last administered on 1/5/20at

08:08;  Start 1/5/20 at 09:00


Pantoprazole Sodium (Protonix) 40 mg DAILYAC PO  Last administered on 1/5/20at 

08:08;  Start 1/5/20 at 07:30


Lisinopril (Prinivil) 5 mg DAILY PO ;  Start 1/5/20 at 09:00;  Stop 1/4/20 at 

19:13;  Status DC


Carvedilol (Coreg) 3.125 mg BIDWMEALS PO  Last administered on 1/5/20at 08:08;  

Start 1/4/20 at 21:00


Lisinopril (Prinivil) 5 mg DAILY PO  Last administered on 1/5/20at 08:09;  Start

1/4/20 at 21:00


Info (Anti-Coagulation Monitoring By Pharmacy) 1 each PRN DAILY  PRN [facility] SEE 

COMMENTS;  Start 1/5/20 at 08:15





Active Scripts


Active


Reported


Lisinopril 5 Mg Tablet 1 Tab PO DAILY


Eliquis (Apixaban) 2.5 Mg Tablet 2.5 Mg PO Q12HR


Folic Acid 20 Mg Capsule 1 Mg PO DAILY


Vitamin D (Cholecalciferol (Vitamin D3)) 10,000 Unit Capsule 50,000 Unit PO 

WEEKLY


Furosemide 40 Mg Tablet 40 Mg PO BID


Omeprazole 40 Mg Capsule.dr 1 Cap PO DAILY


Flomax (Tamsulosin Hcl) 0.4 Mg Cap.er.24h 1 Cap PO BID


Atorvastatin Calcium 40 Mg Tablet 1 Tab PO QHS


Carvedilol ** (Carvedilol) 3.125 Mg Tablet 3.125 Mg PO BIDWMEALS


Vitamin B-1 (Thiamine Mononitrate) 100 Mg Tablet 1 Tab PO DAILY 30 Days


Magnesium (Magnesium Oxide) 400 Mg Capsule 1 Cap PO DAILY 30 Days





Allergies


Allergies:  


Coded Allergies:  


     No Known Drug Allergies (Unverified , 5/25/18)





ROS


PSYCHOLOGICAL ROS:  No: Hallucinations


Eyes:  No Loss of vision


HEENT:  No: Epistaxis


Respiratory:  YES: Shortness of breath; 


   No: Hemoptysis


Cardiovascular:  No Chest Pain


Genitourinary:  No Hematuria


Neurological:  No Seizures


Skin:  No Rash





Physical Exam


General:  Alert, No acute distress


Lungs:  Other (bilateral basal crepitations)


Heart:  Other (heart rate is irregular)


Abdomen:  Soft, No tenderness


Extremities:  Other (trace edema)


Neuro:  Normal speech


Psych/Mental Status:  Mood NL





Vitals


VITALS





Vital Signs








  Date Time  Temp Pulse Resp B/P (MAP) Pulse Ox O2 Delivery O2 Flow Rate FiO2


 


1/5/20 11:00 97.9 65 21 139/84 (102) 98 Nasal Cannula 3.0 





 97.9       











Labs


Labs





Laboratory Tests








Test


 1/4/20


11:12 1/4/20


16:00 1/4/20


19:00 1/5/20


04:21


 


White Blood Count


 5.1 x10^3/uL


(4.0-11.0) 


 


 5.3 x10^3/uL


(4.0-11.0)


 


Red Blood Count


 4.83 x10^6/uL


(4.30-5.70) 


 


 4.27 x10^6/uL


(4.30-5.70)


 


Hemoglobin


 12.5 g/dL


(13.0-17.5) 


 


 10.9 g/dL


(13.0-17.5)


 


Hematocrit


 39.4 %


(39.0-53.0) 


 


 34.8 %


(39.0-53.0)


 


Mean Corpuscular Volume 82 fL ()    81 fL () 


 


Mean Corpuscular Hemoglobin 26 pg (25-35)    26 pg (25-35) 


 


Mean Corpuscular Hemoglobin


Concent 32 g/dL


(31-37) 


 


 31 g/dL


(31-37)


 


Red Cell Distribution Width


 16.1 %


(11.5-14.5) 


 


 16.1 %


(11.5-14.5)


 


Platelet Count


 259 x10^3/uL


(140-400) 


 


 224 x10^3/uL


(140-400)


 


Neutrophils (%) (Auto) 74 % (31-73)    79 % (31-73) 


 


Lymphocytes (%) (Auto) 14 % (24-48)    9 % (24-48) 


 


Monocytes (%) (Auto) 10 % (0-9)    11 % (0-9) 


 


Eosinophils (%) (Auto) 1 % (0-3)    1 % (0-3) 


 


Basophils (%) (Auto) 1 % (0-3)    0 % (0-3) 


 


Neutrophils # (Auto)


 3.8 x10^3/uL


(1.8-7.7) 


 


 4.2 x10^3/uL


(1.8-7.7)


 


Lymphocytes # (Auto)


 0.7 x10^3/uL


(1.0-4.8) 


 


 0.5 x10^3/uL


(1.0-4.8)


 


Monocytes # (Auto)


 0.5 x10^3/uL


(0.0-1.1) 


 


 0.6 x10^3/uL


(0.0-1.1)


 


Eosinophils # (Auto)


 0.0 x10^3/uL


(0.0-0.7) 


 


 0.0 x10^3/uL


(0.0-0.7)


 


Basophils # (Auto)


 0.0 x10^3/uL


(0.0-0.2) 


 


 0.0 x10^3/uL


(0.0-0.2)


 


Prothrombin Time


 14.5 SEC


(11.7-14.0) 


 


 





 


Prothromb Time International


Ratio 1.2 (0.8-1.1) 


 


 


 





 


Activated Partial


Thromboplast Time 37 SEC (24-38) 


 


 


 





 


Sodium Level


 142 mmol/L


(136-145) 


 


 141 mmol/L


(136-145)


 


Potassium Level


 3.4 mmol/L


(3.5-5.1) 


 


 4.0 mmol/L


(3.5-5.1)


 


Chloride Level


 104 mmol/L


() 


 


 105 mmol/L


()


 


Carbon Dioxide Level


 26 mmol/L


(21-32) 


 


 29 mmol/L


(21-32)


 


Anion Gap 12 (6-14)    7 (6-14) 


 


Blood Urea Nitrogen


 18 mg/dL


(8-26) 


 


 18 mg/dL


(8-26)


 


Creatinine


 1.1 mg/dL


(0.7-1.3) 


 


 1.0 mg/dL


(0.7-1.3)


 


Estimated GFR


(Cockcroft-Gault) 78.1 


 


 


 87.2 





 


BUN/Creatinine Ratio 16 (6-20)    18 (6-20) 


 


Glucose Level


 137 mg/dL


(70-99) 


 


 124 mg/dL


(70-99)


 


Lactic Acid Level


 2.0 mmol/L


(0.4-2.0) 2.0 mmol/L


(0.4-2.0) 


 





 


Calcium Level


 7.2 mg/dL


(8.5-10.1) 


 


 7.0 mg/dL


(8.5-10.1)


 


Magnesium Level


 2.0 mg/dL


(1.8-2.4) 


 


 





 


Total Bilirubin


 0.3 mg/dL


(0.2-1.0) 


 


 0.3 mg/dL


(0.2-1.0)


 


Aspartate Amino Transf


(AST/SGOT) 30 U/L (15-37) 


 


 


 26 U/L (15-37) 





 


Alanine Aminotransferase


(ALT/SGPT) 22 U/L (16-63) 


 


 


 22 U/L (16-63) 





 


Alkaline Phosphatase


 122 U/L


() 


 


 104 U/L


()


 


Creatine Kinase


 664 U/L


() 


 


 





 


Creatine Kinase MB (Mass)


 7.1 ng/mL


(0.0-3.6) 


 


 





 


Creatine Kinase MB Relative


Index 1.1 % (0-4) 


 


 


 





 


Troponin I Quantitative


 < 0.017 ng/mL


(0.000-0.055) < 0.017 ng/mL


(0.000-0.055) < 0.017 ng/mL


(0.000-0.055) 





 


NT-Pro-B-Type Natriuretic


Peptide 40431 pg/mL


(0-449) 


 


 





 


Total Protein


 7.6 g/dL


(6.4-8.2) 


 


 6.1 g/dL


(6.4-8.2)


 


Albumin


 3.2 g/dL


(3.4-5.0) 


 


 2.9 g/dL


(3.4-5.0)


 


Albumin/Globulin Ratio 0.7 (1.0-1.7)    0.9 (1.0-1.7) 


 


Lipase


 78 U/L


() 


 


 





 


Thyroid Stimulating Hormone


(TSH) 1.849 uIU/mL


(0.358-3.74) 


 


 





 


Triglycerides Level


 


 


 


 51 mg/dL


(0-150)


 


Cholesterol Level


 


 


 


 148 mg/dL


(0-200)


 


LDL Cholesterol, Calculated


 


 


 


 75 mg/dL


(0-100)


 


VLDL Cholesterol, Calculated


 


 


 


 10 mg/dL


(0-40)


 


Non-HDL Cholesterol Calculated


 


 


 


 85 mg/dL


(0-129)


 


HDL Cholesterol


 


 


 


 63 mg/dL


(40-60)


 


Cholesterol/HDL Ratio    2.3 








Laboratory Tests








Test


 1/4/20


16:00 1/4/20


19:00 1/5/20


04:21


 


Lactic Acid Level


 2.0 mmol/L


(0.4-2.0) 


 





 


Troponin I Quantitative


 < 0.017 ng/mL


(0.000-0.055) < 0.017 ng/mL


(0.000-0.055) 





 


White Blood Count


 


 


 5.3 x10^3/uL


(4.0-11.0)


 


Red Blood Count


 


 


 4.27 x10^6/uL


(4.30-5.70)


 


Hemoglobin


 


 


 10.9 g/dL


(13.0-17.5)


 


Hematocrit


 


 


 34.8 %


(39.0-53.0)


 


Mean Corpuscular Volume   81 fL () 


 


Mean Corpuscular Hemoglobin   26 pg (25-35) 


 


Mean Corpuscular Hemoglobin


Concent 


 


 31 g/dL


(31-37)


 


Red Cell Distribution Width


 


 


 16.1 %


(11.5-14.5)


 


Platelet Count


 


 


 224 x10^3/uL


(140-400)


 


Neutrophils (%) (Auto)   79 % (31-73) 


 


Lymphocytes (%) (Auto)   9 % (24-48) 


 


Monocytes (%) (Auto)   11 % (0-9) 


 


Eosinophils (%) (Auto)   1 % (0-3) 


 


Basophils (%) (Auto)   0 % (0-3) 


 


Neutrophils # (Auto)


 


 


 4.2 x10^3/uL


(1.8-7.7)


 


Lymphocytes # (Auto)


 


 


 0.5 x10^3/uL


(1.0-4.8)


 


Monocytes # (Auto)


 


 


 0.6 x10^3/uL


(0.0-1.1)


 


Eosinophils # (Auto)


 


 


 0.0 x10^3/uL


(0.0-0.7)


 


Basophils # (Auto)


 


 


 0.0 x10^3/uL


(0.0-0.2)


 


Sodium Level


 


 


 141 mmol/L


(136-145)


 


Potassium Level


 


 


 4.0 mmol/L


(3.5-5.1)


 


Chloride Level


 


 


 105 mmol/L


()


 


Carbon Dioxide Level


 


 


 29 mmol/L


(21-32)


 


Anion Gap   7 (6-14) 


 


Blood Urea Nitrogen


 


 


 18 mg/dL


(8-26)


 


Creatinine


 


 


 1.0 mg/dL


(0.7-1.3)


 


Estimated GFR


(Cockcroft-Gault) 


 


 87.2 





 


BUN/Creatinine Ratio   18 (6-20) 


 


Glucose Level


 


 


 124 mg/dL


(70-99)


 


Calcium Level


 


 


 7.0 mg/dL


(8.5-10.1)


 


Total Bilirubin


 


 


 0.3 mg/dL


(0.2-1.0)


 


Aspartate Amino Transf


(AST/SGOT) 


 


 26 U/L (15-37) 





 


Alanine Aminotransferase


(ALT/SGPT) 


 


 22 U/L (16-63) 





 


Alkaline Phosphatase


 


 


 104 U/L


()


 


Total Protein


 


 


 6.1 g/dL


(6.4-8.2)


 


Albumin


 


 


 2.9 g/dL


(3.4-5.0)


 


Albumin/Globulin Ratio   0.9 (1.0-1.7) 


 


Triglycerides Level


 


 


 51 mg/dL


(0-150)


 


Cholesterol Level


 


 


 148 mg/dL


(0-200)


 


LDL Cholesterol, Calculated


 


 


 75 mg/dL


(0-100)


 


VLDL Cholesterol, Calculated


 


 


 10 mg/dL


(0-40)


 


Non-HDL Cholesterol Calculated


 


 


 85 mg/dL


(0-129)


 


HDL Cholesterol


 


 


 63 mg/dL


(40-60)


 


Cholesterol/HDL Ratio   2.3 











Assessment/Plan


Assessment/Plan


1.  Congestive heart failure most probably acute on chronic diastolic. Continue 

diuresis with Lasix. We will obtain records from primary cardiologist office 

regarding any recent 2-D echocardiogram/stress test.


2.  SSS s/p PPM implantation. Clinically stable. We will obtain information 

regarding any recent checks.


3.  CAD mentioned in chart but patient denied any stents in the past. He is 

presently chest pain-free.


4. Accelerated hypertension:  Better controlled since admission


5. Atrial fibrillation, most probably permanent. Heart rate well-controlled. 

Continue eliquis for stroke prophylaxis.


6.  Hyperlipidemia:  Continue statin therapy


Thank you for your consultation











XIMENA RECIO MD            Jan 5, 2020 11:54

## 2020-01-05 NOTE — PDOC
PROGRESS NOTES


History of Present Illness


History of Present Illness





VTE Prophylaxis Ordered


VTE Prophylaxis Devices:  No


VTE Pharmacological Prophylaxi:  Yes





Assessment/Plan


Assessment/Plan


 


IMPRESSION: 








1. Moderate right and small left pleural effusions. Mild pulmonary edema.


2. Acute hypoxic resp failure


3. COPD


4. HYPERTENSIVE URGENCY


5, PACEMAKER PLACEMENT, REMOTE











PLAN





ADMIT


CONSULT CARDIOLOGY


CONSULT PULM


DVT PROPHYLAXIS


IV DIURESIS, LASIX 40MG IV BID


IV HYDRALAZINE PRN BP SUPPORT


ECHO


TSH


THORACENTESIS 1/6





Vitals


Vitals





Vital Signs








  Date Time  Temp Pulse Resp B/P (MAP) Pulse Ox O2 Delivery O2 Flow Rate FiO2


 


1/5/20 08:09    156/79    


 


1/5/20 08:08  77      


 


1/5/20 07:48      Nasal Cannula 2.0 


 


1/5/20 07:00 98.3  21  98   





 98.3       











Physical Exam


General:  Alert, Oriented X3, Cooperative, No acute distress, mild distress


Heart:  Regular rate


Abdomen:  Normal bowel sounds, Soft


Extremities:  No clubbing, No cyanosis





Labs


LABS





Laboratory Tests








Test


 1/4/20


11:12 1/4/20


16:00 1/4/20


19:00 1/5/20


04:21


 


White Blood Count


 5.1 x10^3/uL


(4.0-11.0) 


 


 5.3 x10^3/uL


(4.0-11.0)


 


Red Blood Count


 4.83 x10^6/uL


(4.30-5.70) 


 


 4.27 x10^6/uL


(4.30-5.70)


 


Hemoglobin


 12.5 g/dL


(13.0-17.5) 


 


 10.9 g/dL


(13.0-17.5)


 


Hematocrit


 39.4 %


(39.0-53.0) 


 


 34.8 %


(39.0-53.0)


 


Mean Corpuscular Volume 82 fL ()    81 fL () 


 


Mean Corpuscular Hemoglobin 26 pg (25-35)    26 pg (25-35) 


 


Mean Corpuscular Hemoglobin


Concent 32 g/dL


(31-37) 


 


 31 g/dL


(31-37)


 


Red Cell Distribution Width


 16.1 %


(11.5-14.5) 


 


 16.1 %


(11.5-14.5)


 


Platelet Count


 259 x10^3/uL


(140-400) 


 


 224 x10^3/uL


(140-400)


 


Neutrophils (%) (Auto) 74 % (31-73)    79 % (31-73) 


 


Lymphocytes (%) (Auto) 14 % (24-48)    9 % (24-48) 


 


Monocytes (%) (Auto) 10 % (0-9)    11 % (0-9) 


 


Eosinophils (%) (Auto) 1 % (0-3)    1 % (0-3) 


 


Basophils (%) (Auto) 1 % (0-3)    0 % (0-3) 


 


Neutrophils # (Auto)


 3.8 x10^3/uL


(1.8-7.7) 


 


 4.2 x10^3/uL


(1.8-7.7)


 


Lymphocytes # (Auto)


 0.7 x10^3/uL


(1.0-4.8) 


 


 0.5 x10^3/uL


(1.0-4.8)


 


Monocytes # (Auto)


 0.5 x10^3/uL


(0.0-1.1) 


 


 0.6 x10^3/uL


(0.0-1.1)


 


Eosinophils # (Auto)


 0.0 x10^3/uL


(0.0-0.7) 


 


 0.0 x10^3/uL


(0.0-0.7)


 


Basophils # (Auto)


 0.0 x10^3/uL


(0.0-0.2) 


 


 0.0 x10^3/uL


(0.0-0.2)


 


Prothrombin Time


 14.5 SEC


(11.7-14.0) 


 


 





 


Prothromb Time International


Ratio 1.2 (0.8-1.1) 


 


 


 





 


Activated Partial


Thromboplast Time 37 SEC (24-38) 


 


 


 





 


Sodium Level


 142 mmol/L


(136-145) 


 


 141 mmol/L


(136-145)


 


Potassium Level


 3.4 mmol/L


(3.5-5.1) 


 


 4.0 mmol/L


(3.5-5.1)


 


Chloride Level


 104 mmol/L


() 


 


 105 mmol/L


()


 


Carbon Dioxide Level


 26 mmol/L


(21-32) 


 


 29 mmol/L


(21-32)


 


Anion Gap 12 (6-14)    7 (6-14) 


 


Blood Urea Nitrogen


 18 mg/dL


(8-26) 


 


 18 mg/dL


(8-26)


 


Creatinine


 1.1 mg/dL


(0.7-1.3) 


 


 1.0 mg/dL


(0.7-1.3)


 


Estimated GFR


(Cockcroft-Gault) 78.1 


 


 


 87.2 





 


BUN/Creatinine Ratio 16 (6-20)    18 (6-20) 


 


Glucose Level


 137 mg/dL


(70-99) 


 


 124 mg/dL


(70-99)


 


Lactic Acid Level


 2.0 mmol/L


(0.4-2.0) 2.0 mmol/L


(0.4-2.0) 


 





 


Calcium Level


 7.2 mg/dL


(8.5-10.1) 


 


 7.0 mg/dL


(8.5-10.1)


 


Magnesium Level


 2.0 mg/dL


(1.8-2.4) 


 


 





 


Total Bilirubin


 0.3 mg/dL


(0.2-1.0) 


 


 0.3 mg/dL


(0.2-1.0)


 


Aspartate Amino Transf


(AST/SGOT) 30 U/L (15-37) 


 


 


 26 U/L (15-37) 





 


Alanine Aminotransferase


(ALT/SGPT) 22 U/L (16-63) 


 


 


 22 U/L (16-63) 





 


Alkaline Phosphatase


 122 U/L


() 


 


 104 U/L


()


 


Creatine Kinase


 664 U/L


() 


 


 





 


Creatine Kinase MB (Mass)


 7.1 ng/mL


(0.0-3.6) 


 


 





 


Creatine Kinase MB Relative


Index 1.1 % (0-4) 


 


 


 





 


Troponin I Quantitative


 < 0.017 ng/mL


(0.000-0.055) < 0.017 ng/mL


(0.000-0.055) < 0.017 ng/mL


(0.000-0.055) 





 


NT-Pro-B-Type Natriuretic


Peptide 61435 pg/mL


(0-449) 


 


 





 


Total Protein


 7.6 g/dL


(6.4-8.2) 


 


 6.1 g/dL


(6.4-8.2)


 


Albumin


 3.2 g/dL


(3.4-5.0) 


 


 2.9 g/dL


(3.4-5.0)


 


Albumin/Globulin Ratio 0.7 (1.0-1.7)    0.9 (1.0-1.7) 


 


Lipase


 78 U/L


() 


 


 





 


Thyroid Stimulating Hormone


(TSH) 1.849 uIU/mL


(0.358-3.74) 


 


 





 


Triglycerides Level


 


 


 


 51 mg/dL


(0-150)


 


Cholesterol Level


 


 


 


 148 mg/dL


(0-200)


 


LDL Cholesterol, Calculated


 


 


 


 75 mg/dL


(0-100)


 


VLDL Cholesterol, Calculated


 


 


 


 10 mg/dL


(0-40)


 


Non-HDL Cholesterol Calculated


 


 


 


 85 mg/dL


(0-129)


 


HDL Cholesterol


 


 


 


 63 mg/dL


(40-60)


 


Cholesterol/HDL Ratio    2.3 











Assessment and Plan


Assessmemt and Plan


Problems


Medical Problems:


(1) CHF exacerbation


Status: Acute  





(2) Hypokalemia


Status: Acute  





(3) Hypoxia


Status: Acute  











Comment


Review of Relevant


I have reviewed the following items dao (where applicable) has been applied.


Labs





Laboratory Tests








Test


 1/4/20


11:12 1/4/20


16:00 1/4/20


19:00 1/5/20


04:21


 


White Blood Count


 5.1 x10^3/uL


(4.0-11.0) 


 


 5.3 x10^3/uL


(4.0-11.0)


 


Red Blood Count


 4.83 x10^6/uL


(4.30-5.70) 


 


 4.27 x10^6/uL


(4.30-5.70)


 


Hemoglobin


 12.5 g/dL


(13.0-17.5) 


 


 10.9 g/dL


(13.0-17.5)


 


Hematocrit


 39.4 %


(39.0-53.0) 


 


 34.8 %


(39.0-53.0)


 


Mean Corpuscular Volume 82 fL ()    81 fL () 


 


Mean Corpuscular Hemoglobin 26 pg (25-35)    26 pg (25-35) 


 


Mean Corpuscular Hemoglobin


Concent 32 g/dL


(31-37) 


 


 31 g/dL


(31-37)


 


Red Cell Distribution Width


 16.1 %


(11.5-14.5) 


 


 16.1 %


(11.5-14.5)


 


Platelet Count


 259 x10^3/uL


(140-400) 


 


 224 x10^3/uL


(140-400)


 


Neutrophils (%) (Auto) 74 % (31-73)    79 % (31-73) 


 


Lymphocytes (%) (Auto) 14 % (24-48)    9 % (24-48) 


 


Monocytes (%) (Auto) 10 % (0-9)    11 % (0-9) 


 


Eosinophils (%) (Auto) 1 % (0-3)    1 % (0-3) 


 


Basophils (%) (Auto) 1 % (0-3)    0 % (0-3) 


 


Neutrophils # (Auto)


 3.8 x10^3/uL


(1.8-7.7) 


 


 4.2 x10^3/uL


(1.8-7.7)


 


Lymphocytes # (Auto)


 0.7 x10^3/uL


(1.0-4.8) 


 


 0.5 x10^3/uL


(1.0-4.8)


 


Monocytes # (Auto)


 0.5 x10^3/uL


(0.0-1.1) 


 


 0.6 x10^3/uL


(0.0-1.1)


 


Eosinophils # (Auto)


 0.0 x10^3/uL


(0.0-0.7) 


 


 0.0 x10^3/uL


(0.0-0.7)


 


Basophils # (Auto)


 0.0 x10^3/uL


(0.0-0.2) 


 


 0.0 x10^3/uL


(0.0-0.2)


 


Prothrombin Time


 14.5 SEC


(11.7-14.0) 


 


 





 


Prothromb Time International


Ratio 1.2 (0.8-1.1) 


 


 


 





 


Activated Partial


Thromboplast Time 37 SEC (24-38) 


 


 


 





 


Sodium Level


 142 mmol/L


(136-145) 


 


 141 mmol/L


(136-145)


 


Potassium Level


 3.4 mmol/L


(3.5-5.1) 


 


 4.0 mmol/L


(3.5-5.1)


 


Chloride Level


 104 mmol/L


() 


 


 105 mmol/L


()


 


Carbon Dioxide Level


 26 mmol/L


(21-32) 


 


 29 mmol/L


(21-32)


 


Anion Gap 12 (6-14)    7 (6-14) 


 


Blood Urea Nitrogen


 18 mg/dL


(8-26) 


 


 18 mg/dL


(8-26)


 


Creatinine


 1.1 mg/dL


(0.7-1.3) 


 


 1.0 mg/dL


(0.7-1.3)


 


Estimated GFR


(Cockcroft-Gault) 78.1 


 


 


 87.2 





 


BUN/Creatinine Ratio 16 (6-20)    18 (6-20) 


 


Glucose Level


 137 mg/dL


(70-99) 


 


 124 mg/dL


(70-99)


 


Lactic Acid Level


 2.0 mmol/L


(0.4-2.0) 2.0 mmol/L


(0.4-2.0) 


 





 


Calcium Level


 7.2 mg/dL


(8.5-10.1) 


 


 7.0 mg/dL


(8.5-10.1)


 


Magnesium Level


 2.0 mg/dL


(1.8-2.4) 


 


 





 


Total Bilirubin


 0.3 mg/dL


(0.2-1.0) 


 


 0.3 mg/dL


(0.2-1.0)


 


Aspartate Amino Transf


(AST/SGOT) 30 U/L (15-37) 


 


 


 26 U/L (15-37) 





 


Alanine Aminotransferase


(ALT/SGPT) 22 U/L (16-63) 


 


 


 22 U/L (16-63) 





 


Alkaline Phosphatase


 122 U/L


() 


 


 104 U/L


()


 


Creatine Kinase


 664 U/L


() 


 


 





 


Creatine Kinase MB (Mass)


 7.1 ng/mL


(0.0-3.6) 


 


 





 


Creatine Kinase MB Relative


Index 1.1 % (0-4) 


 


 


 





 


Troponin I Quantitative


 < 0.017 ng/mL


(0.000-0.055) < 0.017 ng/mL


(0.000-0.055) < 0.017 ng/mL


(0.000-0.055) 





 


NT-Pro-B-Type Natriuretic


Peptide 51486 pg/mL


(0-449) 


 


 





 


Total Protein


 7.6 g/dL


(6.4-8.2) 


 


 6.1 g/dL


(6.4-8.2)


 


Albumin


 3.2 g/dL


(3.4-5.0) 


 


 2.9 g/dL


(3.4-5.0)


 


Albumin/Globulin Ratio 0.7 (1.0-1.7)    0.9 (1.0-1.7) 


 


Lipase


 78 U/L


() 


 


 





 


Thyroid Stimulating Hormone


(TSH) 1.849 uIU/mL


(0.358-3.74) 


 


 





 


Triglycerides Level


 


 


 


 51 mg/dL


(0-150)


 


Cholesterol Level


 


 


 


 148 mg/dL


(0-200)


 


LDL Cholesterol, Calculated


 


 


 


 75 mg/dL


(0-100)


 


VLDL Cholesterol, Calculated


 


 


 


 10 mg/dL


(0-40)


 


Non-HDL Cholesterol Calculated


 


 


 


 85 mg/dL


(0-129)


 


HDL Cholesterol


 


 


 


 63 mg/dL


(40-60)


 


Cholesterol/HDL Ratio    2.3 








Laboratory Tests








Test


 1/4/20


11:12 1/4/20


16:00 1/4/20


19:00 1/5/20


04:21


 


White Blood Count


 5.1 x10^3/uL


(4.0-11.0) 


 


 5.3 x10^3/uL


(4.0-11.0)


 


Red Blood Count


 4.83 x10^6/uL


(4.30-5.70) 


 


 4.27 x10^6/uL


(4.30-5.70)


 


Hemoglobin


 12.5 g/dL


(13.0-17.5) 


 


 10.9 g/dL


(13.0-17.5)


 


Hematocrit


 39.4 %


(39.0-53.0) 


 


 34.8 %


(39.0-53.0)


 


Mean Corpuscular Volume 82 fL ()    81 fL () 


 


Mean Corpuscular Hemoglobin 26 pg (25-35)    26 pg (25-35) 


 


Mean Corpuscular Hemoglobin


Concent 32 g/dL


(31-37) 


 


 31 g/dL


(31-37)


 


Red Cell Distribution Width


 16.1 %


(11.5-14.5) 


 


 16.1 %


(11.5-14.5)


 


Platelet Count


 259 x10^3/uL


(140-400) 


 


 224 x10^3/uL


(140-400)


 


Neutrophils (%) (Auto) 74 % (31-73)    79 % (31-73) 


 


Lymphocytes (%) (Auto) 14 % (24-48)    9 % (24-48) 


 


Monocytes (%) (Auto) 10 % (0-9)    11 % (0-9) 


 


Eosinophils (%) (Auto) 1 % (0-3)    1 % (0-3) 


 


Basophils (%) (Auto) 1 % (0-3)    0 % (0-3) 


 


Neutrophils # (Auto)


 3.8 x10^3/uL


(1.8-7.7) 


 


 4.2 x10^3/uL


(1.8-7.7)


 


Lymphocytes # (Auto)


 0.7 x10^3/uL


(1.0-4.8) 


 


 0.5 x10^3/uL


(1.0-4.8)


 


Monocytes # (Auto)


 0.5 x10^3/uL


(0.0-1.1) 


 


 0.6 x10^3/uL


(0.0-1.1)


 


Eosinophils # (Auto)


 0.0 x10^3/uL


(0.0-0.7) 


 


 0.0 x10^3/uL


(0.0-0.7)


 


Basophils # (Auto)


 0.0 x10^3/uL


(0.0-0.2) 


 


 0.0 x10^3/uL


(0.0-0.2)


 


Prothrombin Time


 14.5 SEC


(11.7-14.0) 


 


 





 


Prothromb Time International


Ratio 1.2 (0.8-1.1) 


 


 


 





 


Activated Partial


Thromboplast Time 37 SEC (24-38) 


 


 


 





 


Sodium Level


 142 mmol/L


(136-145) 


 


 141 mmol/L


(136-145)


 


Potassium Level


 3.4 mmol/L


(3.5-5.1) 


 


 4.0 mmol/L


(3.5-5.1)


 


Chloride Level


 104 mmol/L


() 


 


 105 mmol/L


()


 


Carbon Dioxide Level


 26 mmol/L


(21-32) 


 


 29 mmol/L


(21-32)


 


Anion Gap 12 (6-14)    7 (6-14) 


 


Blood Urea Nitrogen


 18 mg/dL


(8-26) 


 


 18 mg/dL


(8-26)


 


Creatinine


 1.1 mg/dL


(0.7-1.3) 


 


 1.0 mg/dL


(0.7-1.3)


 


Estimated GFR


(Cockcroft-Gault) 78.1 


 


 


 87.2 





 


BUN/Creatinine Ratio 16 (6-20)    18 (6-20) 


 


Glucose Level


 137 mg/dL


(70-99) 


 


 124 mg/dL


(70-99)


 


Lactic Acid Level


 2.0 mmol/L


(0.4-2.0) 2.0 mmol/L


(0.4-2.0) 


 





 


Calcium Level


 7.2 mg/dL


(8.5-10.1) 


 


 7.0 mg/dL


(8.5-10.1)


 


Magnesium Level


 2.0 mg/dL


(1.8-2.4) 


 


 





 


Total Bilirubin


 0.3 mg/dL


(0.2-1.0) 


 


 0.3 mg/dL


(0.2-1.0)


 


Aspartate Amino Transf


(AST/SGOT) 30 U/L (15-37) 


 


 


 26 U/L (15-37) 





 


Alanine Aminotransferase


(ALT/SGPT) 22 U/L (16-63) 


 


 


 22 U/L (16-63) 





 


Alkaline Phosphatase


 122 U/L


() 


 


 104 U/L


()


 


Creatine Kinase


 664 U/L


() 


 


 





 


Creatine Kinase MB (Mass)


 7.1 ng/mL


(0.0-3.6) 


 


 





 


Creatine Kinase MB Relative


Index 1.1 % (0-4) 


 


 


 





 


Troponin I Quantitative


 < 0.017 ng/mL


(0.000-0.055) < 0.017 ng/mL


(0.000-0.055) < 0.017 ng/mL


(0.000-0.055) 





 


NT-Pro-B-Type Natriuretic


Peptide 84595 pg/mL


(0-449) 


 


 





 


Total Protein


 7.6 g/dL


(6.4-8.2) 


 


 6.1 g/dL


(6.4-8.2)


 


Albumin


 3.2 g/dL


(3.4-5.0) 


 


 2.9 g/dL


(3.4-5.0)


 


Albumin/Globulin Ratio 0.7 (1.0-1.7)    0.9 (1.0-1.7) 


 


Lipase


 78 U/L


() 


 


 





 


Thyroid Stimulating Hormone


(TSH) 1.849 uIU/mL


(0.358-3.74) 


 


 





 


Triglycerides Level


 


 


 


 51 mg/dL


(0-150)


 


Cholesterol Level


 


 


 


 148 mg/dL


(0-200)


 


LDL Cholesterol, Calculated


 


 


 


 75 mg/dL


(0-100)


 


VLDL Cholesterol, Calculated


 


 


 


 10 mg/dL


(0-40)


 


Non-HDL Cholesterol Calculated


 


 


 


 85 mg/dL


(0-129)


 


HDL Cholesterol


 


 


 


 63 mg/dL


(40-60)


 


Cholesterol/HDL Ratio    2.3 








Medications





Current Medications


Aspirin (Roz Aspirin) 325 mg 1X  ONCE PO  Last administered on 1/4/20at 11:30;

 Start 1/4/20 at 11:30;  Stop 1/4/20 at 11:31;  Status DC


Nitroglycerin (Nitro-Bid Oint) 0.5 inch 1X  ONCE TP  Last administered on 

1/4/20at 11:30;  Start 1/4/20 at 11:30;  Stop 1/4/20 at 11:31;  Status DC


Albuterol/ Ipratropium (Duoneb) 3 ml 1X  ONCE NEB  Last administered on 1/4/20at

11:29;  Start 1/4/20 at 11:30;  Stop 1/4/20 at 11:31;  Status DC


Furosemide (Lasix) 40 mg 1X  ONCE IVP  Last administered on 1/4/20at 12:48;  

Start 1/4/20 at 12:30;  Stop 1/4/20 at 12:31;  Status DC


Potassium Chloride (Klor-Con) 40 meq 1X  ONCE PO  Last administered on 1/4/20at 

12:48;  Start 1/4/20 at 12:30;  Stop 1/4/20 at 12:31;  Status DC


Ondansetron HCl (Zofran) 4 mg PRN Q8HRS  PRN IV NAUSEA/VOMITING;  Start 1/4/20 

at 12:45;  Stop 1/4/20 at 13:45;  Status DC


Acetaminophen (Tylenol) 650 mg PRN Q4HRS  PRN PO FEVER;  Start 1/4/20 at 12:45; 

Stop 1/4/20 at 13:45;  Status DC


Sodium Chloride (Normal Saline Flush) 3 ml QSHIFT  PRN IV AFTER MEDS AND BLOOD 

DRAWS;  Start 1/4/20 at 13:45


Ondansetron HCl (Zofran) 4 mg PRN Q4HRS  PRN IV NAUSEA/VOMITING;  Start 1/4/20 

at 13:45


Acetaminophen (Tylenol) 650 mg PRN Q4HRS  PRN PO TEMP OVER 100.4F OR MILD PAIN; 

Start 1/4/20 at 13:45


Al Hydroxide/Mg Hydroxide (Mylanta Plus Xs) 30 ml PRN DAILY  PRN PO HEARTBURN / 

GAS;  Start 1/4/20 at 13:45


Clonidine HCl (Catapres) 0.1 mg PRN Q6HRS  PRN PO SBP>160 OR DBP>90;  Start 

1/4/20 at 13:45


Docusate Sodium (Colace) 100 mg PRN BID  PRN PO CONSTIPATION;  Start 1/4/20 at 

13:45


Albuterol/ Ipratropium (Duoneb) 3 ml Q4H NEB  Last administered on 1/5/20at 0

4:28;  Start 1/4/20 at 16:00


Guaifenesin (Robitussin) 200 mg PRN Q4HRS  PRN PO COUGH;  Start 1/4/20 at 13:45


Lorazepam (Ativan) 0.5 mg PRN Q4HRS  PRN PO ANXIETY / AGITATION;  Start 1/4/20 

at 13:45


Enoxaparin Sodium (Lovenox 40mg Syringe) 40 mg DAILY SQ ;  Start 1/5/20 at 

09:00;  Stop 1/4/20 at 15:50;  Status DC


Furosemide (Lasix) 40 mg BID92 IVP  Last administered on 1/5/20at 08:07;  Start 

1/4/20 at 14:00


Potassium Chloride (Klor-Con) 40 meq 1X  ONCE PO ;  Start 1/4/20 at 14:15;  Stop

1/4/20 at 14:16;  Status DC


Potassium Chloride (Klor-Con) 20 meq DAILY PO  Last administered on 1/5/20at 

08:08;  Start 1/5/20 at 09:00


Hydralazine HCl (Apresoline Inj) 10 mg PRN Q4HRS  PRN IVP ELEVATED BP, SEE 

COMMENTS;  Start 1/4/20 at 14:00


Lisinopril (Prinivil) 5 mg BID PO ;  Start 1/4/20 at 21:00;  Stop 1/4/20 at 

15:50;  Status DC


Apixaban (Eliquis) 2.5 mg Q12HR PO  Last administered on 1/5/20at 08:11;  Start 

1/4/20 at 21:00


Atorvastatin Calcium (Lipitor) 40 mg QHS PO  Last administered on 1/4/20at 

20:19;  Start 1/4/20 at 21:00


Carvedilol (Coreg) 3.125 mg BIDWMEALS PO ;  Start 1/5/20 at 21:00;  Stop 1/4/20 

at 19:11;  Status DC


Furosemide (Lasix) 40 mg BID PO ;  Start 1/4/20 at 21:00;  Stop 1/4/20 at 19:16;

 Status DC


Tamsulosin HCl (Flomax) 0.4 mg BID PO  Last administered on 1/5/20at 08:08;  

Start 1/4/20 at 21:00


Thiamine Mononitrate (Vitamin B-1) 100 mg DAILY PO  Last administered on 

1/5/20at 08:08;  Start 1/5/20 at 09:00


Ergocalciferol (Vitamin D2) 50,000 unit WEEKLY PO ;  Start 1/11/20 at 09:00


Folic Acid (Folic Acid) 1 mg DAILY PO  Last administered on 1/5/20at 08:09;  

Start 1/5/20 at 09:00


Magnesium Oxide (Magnesium Oxide) 400 mg DAILY PO  Last administered on 1/5/20at

08:08;  Start 1/5/20 at 09:00


Pantoprazole Sodium (Protonix) 40 mg DAILYAC PO  Last administered on 1/5/20at 

08:08;  Start 1/5/20 at 07:30


Lisinopril (Prinivil) 5 mg DAILY PO ;  Start 1/5/20 at 09:00;  Stop 1/4/20 at 

19:13;  Status DC


Carvedilol (Coreg) 3.125 mg BIDWMEALS PO  Last administered on 1/5/20at 08:08;  

Start 1/4/20 at 21:00


Lisinopril (Prinivil) 5 mg DAILY PO  Last administered on 1/5/20at 08:09;  Start

1/4/20 at 21:00


Info (Anti-Coagulation Monitoring By Pharmacy) 1 each PRN DAILY  PRN [facility] SEE 

COMMENTS;  Start 1/5/20 at 08:15





Active Scripts


Active


Reported


Lisinopril 5 Mg Tablet 1 Tab PO DAILY


Eliquis (Apixaban) 2.5 Mg Tablet 2.5 Mg PO Q12HR


Folic Acid 20 Mg Capsule 1 Mg PO DAILY


Vitamin D (Cholecalciferol (Vitamin D3)) 10,000 Unit Capsule 50,000 Unit PO 

WEEKLY


Furosemide 40 Mg Tablet 40 Mg PO BID


Omeprazole 40 Mg Capsule.dr 1 Cap PO DAILY


Flomax (Tamsulosin Hcl) 0.4 Mg Cap.er.24h 1 Cap PO BID


Atorvastatin Calcium 40 Mg Tablet 1 Tab PO QHS


Carvedilol ** (Carvedilol) 3.125 Mg Tablet 3.125 Mg PO BIDWMEALS


Vitamin B-1 (Thiamine Mononitrate) 100 Mg Tablet 1 Tab PO DAILY 30 Days


Magnesium (Magnesium Oxide) 400 Mg Capsule 1 Cap PO DAILY 30 Days


Vitals/I & O





Vital Sign - Last 24 Hours








 1/4/20 1/4/20 1/4/20 1/4/20





 11:15 11:15 11:25 11:30


 


Temp 97.7   





 97.7   


 


Pulse 77 88  84


 


Resp 32 32  


 


B/P (MAP) 192/113 (139) 190/114 (139)  190/114


 


Pulse Ox 95 99 97 


 


O2 Delivery Room Air Nasal Cannula Nasal Cannula 


 


O2 Flow Rate 3.0 2.5 2.5 


 


    





    





 1/4/20 1/4/20 1/4/20 1/4/20





 12:45 13:15 13:45 14:45


 


Pulse 82 76 64 76


 


Resp 26 28 28 26


 


B/P (MAP) 198/90 (126) 188/100 (129) 165/83 (110) 166/66 (99)


 


Pulse Ox 95 91 95 92


 


O2 Delivery Nasal Cannula Nasal Cannula Nasal Cannula Nasal Cannula


 


O2 Flow Rate 2.5 2.5 2.5 2.5





 1/4/20 1/4/20 1/4/20 1/4/20





 15:20 16:13 19:00 19:30


 


Temp    98.0





    98.0


 


Pulse    89


 


Resp    21


 


B/P (MAP)    166/93 (117)


 


Pulse Ox 97   96


 


O2 Delivery Nasal Cannula Nasal Cannula Nasal Cannula Nasal Cannula


 


O2 Flow Rate 2.5 2.0 2.0 3.0


 


    





    





 1/4/20 1/4/20 1/4/20 1/4/20





 20:19 20:20 20:41 23:00


 


Temp    97.7





    97.7


 


Pulse 78 76  66


 


Resp    22


 


B/P (MAP) 166/93 166/93  156/89 (111)


 


Pulse Ox   97 95


 


O2 Delivery   Nasal Cannula Nasal Cannula


 


O2 Flow Rate   3.0 3.0


 


    





    





 1/5/20 1/5/20 1/5/20 1/5/20





 03:40 07:00 07:48 08:08


 


Temp 97.9 98.3  





 97.9 98.3  


 


Pulse 73 76  77


 


Resp 21 21  


 


B/P (MAP) 135/98 (110) 156/79 (104)  156/79


 


Pulse Ox 97 98  


 


O2 Delivery Nasal Cannula Nasal Cannula Nasal Cannula 


 


O2 Flow Rate 3.0 3.0 2.0 


 


    





    





 1/5/20   





 08:09   


 


B/P (MAP) 156/79   














Intake and Output   


 


 1/4/20 1/4/20 1/5/20





 15:00 23:00 07:00


 


Intake Total   500 ml


 


Output Total  750 ml 800 ml


 


Balance  -750 ml -300 ml

















HERMINIO WILKERSON MD           Jan 5, 2020 09:46

## 2020-01-05 NOTE — PDOC
PULMONARY PROGRESS NOTES


Vitals





Vital Signs








  Date Time  Temp Pulse Resp B/P (MAP) Pulse Ox O2 Delivery O2 Flow Rate FiO2


 


1/5/20 08:09    156/79    


 


1/5/20 08:08  77      


 


1/5/20 07:48      Nasal Cannula 2.0 


 


1/5/20 07:00 98.3  21  98   





 98.3       








Labs





Laboratory Tests








Test


 1/4/20


11:12 1/4/20


16:00 1/4/20


19:00 1/5/20


04:21


 


White Blood Count


 5.1 x10^3/uL


(4.0-11.0) 


 


 5.3 x10^3/uL


(4.0-11.0)


 


Red Blood Count


 4.83 x10^6/uL


(4.30-5.70) 


 


 4.27 x10^6/uL


(4.30-5.70)


 


Hemoglobin


 12.5 g/dL


(13.0-17.5) 


 


 10.9 g/dL


(13.0-17.5)


 


Hematocrit


 39.4 %


(39.0-53.0) 


 


 34.8 %


(39.0-53.0)


 


Mean Corpuscular Volume 82 fL ()    81 fL () 


 


Mean Corpuscular Hemoglobin 26 pg (25-35)    26 pg (25-35) 


 


Mean Corpuscular Hemoglobin


Concent 32 g/dL


(31-37) 


 


 31 g/dL


(31-37)


 


Red Cell Distribution Width


 16.1 %


(11.5-14.5) 


 


 16.1 %


(11.5-14.5)


 


Platelet Count


 259 x10^3/uL


(140-400) 


 


 224 x10^3/uL


(140-400)


 


Neutrophils (%) (Auto) 74 % (31-73)    79 % (31-73) 


 


Lymphocytes (%) (Auto) 14 % (24-48)    9 % (24-48) 


 


Monocytes (%) (Auto) 10 % (0-9)    11 % (0-9) 


 


Eosinophils (%) (Auto) 1 % (0-3)    1 % (0-3) 


 


Basophils (%) (Auto) 1 % (0-3)    0 % (0-3) 


 


Neutrophils # (Auto)


 3.8 x10^3/uL


(1.8-7.7) 


 


 4.2 x10^3/uL


(1.8-7.7)


 


Lymphocytes # (Auto)


 0.7 x10^3/uL


(1.0-4.8) 


 


 0.5 x10^3/uL


(1.0-4.8)


 


Monocytes # (Auto)


 0.5 x10^3/uL


(0.0-1.1) 


 


 0.6 x10^3/uL


(0.0-1.1)


 


Eosinophils # (Auto)


 0.0 x10^3/uL


(0.0-0.7) 


 


 0.0 x10^3/uL


(0.0-0.7)


 


Basophils # (Auto)


 0.0 x10^3/uL


(0.0-0.2) 


 


 0.0 x10^3/uL


(0.0-0.2)


 


Prothrombin Time


 14.5 SEC


(11.7-14.0) 


 


 





 


Prothromb Time International


Ratio 1.2 (0.8-1.1) 


 


 


 





 


Activated Partial


Thromboplast Time 37 SEC (24-38) 


 


 


 





 


Sodium Level


 142 mmol/L


(136-145) 


 


 141 mmol/L


(136-145)


 


Potassium Level


 3.4 mmol/L


(3.5-5.1) 


 


 4.0 mmol/L


(3.5-5.1)


 


Chloride Level


 104 mmol/L


() 


 


 105 mmol/L


()


 


Carbon Dioxide Level


 26 mmol/L


(21-32) 


 


 29 mmol/L


(21-32)


 


Anion Gap 12 (6-14)    7 (6-14) 


 


Blood Urea Nitrogen


 18 mg/dL


(8-26) 


 


 18 mg/dL


(8-26)


 


Creatinine


 1.1 mg/dL


(0.7-1.3) 


 


 1.0 mg/dL


(0.7-1.3)


 


Estimated GFR


(Cockcroft-Gault) 78.1 


 


 


 87.2 





 


BUN/Creatinine Ratio 16 (6-20)    18 (6-20) 


 


Glucose Level


 137 mg/dL


(70-99) 


 


 124 mg/dL


(70-99)


 


Lactic Acid Level


 2.0 mmol/L


(0.4-2.0) 2.0 mmol/L


(0.4-2.0) 


 





 


Calcium Level


 7.2 mg/dL


(8.5-10.1) 


 


 7.0 mg/dL


(8.5-10.1)


 


Magnesium Level


 2.0 mg/dL


(1.8-2.4) 


 


 





 


Total Bilirubin


 0.3 mg/dL


(0.2-1.0) 


 


 0.3 mg/dL


(0.2-1.0)


 


Aspartate Amino Transf


(AST/SGOT) 30 U/L (15-37) 


 


 


 26 U/L (15-37) 





 


Alanine Aminotransferase


(ALT/SGPT) 22 U/L (16-63) 


 


 


 22 U/L (16-63) 





 


Alkaline Phosphatase


 122 U/L


() 


 


 104 U/L


()


 


Creatine Kinase


 664 U/L


() 


 


 





 


Creatine Kinase MB (Mass)


 7.1 ng/mL


(0.0-3.6) 


 


 





 


Creatine Kinase MB Relative


Index 1.1 % (0-4) 


 


 


 





 


Troponin I Quantitative


 < 0.017 ng/mL


(0.000-0.055) < 0.017 ng/mL


(0.000-0.055) < 0.017 ng/mL


(0.000-0.055) 





 


NT-Pro-B-Type Natriuretic


Peptide 01494 pg/mL


(0-449) 


 


 





 


Total Protein


 7.6 g/dL


(6.4-8.2) 


 


 6.1 g/dL


(6.4-8.2)


 


Albumin


 3.2 g/dL


(3.4-5.0) 


 


 2.9 g/dL


(3.4-5.0)


 


Albumin/Globulin Ratio 0.7 (1.0-1.7)    0.9 (1.0-1.7) 


 


Lipase


 78 U/L


() 


 


 





 


Thyroid Stimulating Hormone


(TSH) 1.849 uIU/mL


(0.358-3.74) 


 


 





 


Triglycerides Level


 


 


 


 51 mg/dL


(0-150)


 


Cholesterol Level


 


 


 


 148 mg/dL


(0-200)


 


LDL Cholesterol, Calculated


 


 


 


 75 mg/dL


(0-100)


 


VLDL Cholesterol, Calculated


 


 


 


 10 mg/dL


(0-40)


 


Non-HDL Cholesterol Calculated


 


 


 


 85 mg/dL


(0-129)


 


HDL Cholesterol


 


 


 


 63 mg/dL


(40-60)


 


Cholesterol/HDL Ratio    2.3 








Laboratory Tests








Test


 1/4/20


11:12 1/4/20


16:00 1/4/20


19:00 1/5/20


04:21


 


White Blood Count


 5.1 x10^3/uL


(4.0-11.0) 


 


 5.3 x10^3/uL


(4.0-11.0)


 


Red Blood Count


 4.83 x10^6/uL


(4.30-5.70) 


 


 4.27 x10^6/uL


(4.30-5.70)


 


Hemoglobin


 12.5 g/dL


(13.0-17.5) 


 


 10.9 g/dL


(13.0-17.5)


 


Hematocrit


 39.4 %


(39.0-53.0) 


 


 34.8 %


(39.0-53.0)


 


Mean Corpuscular Volume 82 fL ()    81 fL () 


 


Mean Corpuscular Hemoglobin 26 pg (25-35)    26 pg (25-35) 


 


Mean Corpuscular Hemoglobin


Concent 32 g/dL


(31-37) 


 


 31 g/dL


(31-37)


 


Red Cell Distribution Width


 16.1 %


(11.5-14.5) 


 


 16.1 %


(11.5-14.5)


 


Platelet Count


 259 x10^3/uL


(140-400) 


 


 224 x10^3/uL


(140-400)


 


Neutrophils (%) (Auto) 74 % (31-73)    79 % (31-73) 


 


Lymphocytes (%) (Auto) 14 % (24-48)    9 % (24-48) 


 


Monocytes (%) (Auto) 10 % (0-9)    11 % (0-9) 


 


Eosinophils (%) (Auto) 1 % (0-3)    1 % (0-3) 


 


Basophils (%) (Auto) 1 % (0-3)    0 % (0-3) 


 


Neutrophils # (Auto)


 3.8 x10^3/uL


(1.8-7.7) 


 


 4.2 x10^3/uL


(1.8-7.7)


 


Lymphocytes # (Auto)


 0.7 x10^3/uL


(1.0-4.8) 


 


 0.5 x10^3/uL


(1.0-4.8)


 


Monocytes # (Auto)


 0.5 x10^3/uL


(0.0-1.1) 


 


 0.6 x10^3/uL


(0.0-1.1)


 


Eosinophils # (Auto)


 0.0 x10^3/uL


(0.0-0.7) 


 


 0.0 x10^3/uL


(0.0-0.7)


 


Basophils # (Auto)


 0.0 x10^3/uL


(0.0-0.2) 


 


 0.0 x10^3/uL


(0.0-0.2)


 


Prothrombin Time


 14.5 SEC


(11.7-14.0) 


 


 





 


Prothromb Time International


Ratio 1.2 (0.8-1.1) 


 


 


 





 


Activated Partial


Thromboplast Time 37 SEC (24-38) 


 


 


 





 


Sodium Level


 142 mmol/L


(136-145) 


 


 141 mmol/L


(136-145)


 


Potassium Level


 3.4 mmol/L


(3.5-5.1) 


 


 4.0 mmol/L


(3.5-5.1)


 


Chloride Level


 104 mmol/L


() 


 


 105 mmol/L


()


 


Carbon Dioxide Level


 26 mmol/L


(21-32) 


 


 29 mmol/L


(21-32)


 


Anion Gap 12 (6-14)    7 (6-14) 


 


Blood Urea Nitrogen


 18 mg/dL


(8-26) 


 


 18 mg/dL


(8-26)


 


Creatinine


 1.1 mg/dL


(0.7-1.3) 


 


 1.0 mg/dL


(0.7-1.3)


 


Estimated GFR


(Cockcroft-Gault) 78.1 


 


 


 87.2 





 


BUN/Creatinine Ratio 16 (6-20)    18 (6-20) 


 


Glucose Level


 137 mg/dL


(70-99) 


 


 124 mg/dL


(70-99)


 


Lactic Acid Level


 2.0 mmol/L


(0.4-2.0) 2.0 mmol/L


(0.4-2.0) 


 





 


Calcium Level


 7.2 mg/dL


(8.5-10.1) 


 


 7.0 mg/dL


(8.5-10.1)


 


Magnesium Level


 2.0 mg/dL


(1.8-2.4) 


 


 





 


Total Bilirubin


 0.3 mg/dL


(0.2-1.0) 


 


 0.3 mg/dL


(0.2-1.0)


 


Aspartate Amino Transf


(AST/SGOT) 30 U/L (15-37) 


 


 


 26 U/L (15-37) 





 


Alanine Aminotransferase


(ALT/SGPT) 22 U/L (16-63) 


 


 


 22 U/L (16-63) 





 


Alkaline Phosphatase


 122 U/L


() 


 


 104 U/L


()


 


Creatine Kinase


 664 U/L


() 


 


 





 


Creatine Kinase MB (Mass)


 7.1 ng/mL


(0.0-3.6) 


 


 





 


Creatine Kinase MB Relative


Index 1.1 % (0-4) 


 


 


 





 


Troponin I Quantitative


 < 0.017 ng/mL


(0.000-0.055) < 0.017 ng/mL


(0.000-0.055) < 0.017 ng/mL


(0.000-0.055) 





 


NT-Pro-B-Type Natriuretic


Peptide 84709 pg/mL


(0-449) 


 


 





 


Total Protein


 7.6 g/dL


(6.4-8.2) 


 


 6.1 g/dL


(6.4-8.2)


 


Albumin


 3.2 g/dL


(3.4-5.0) 


 


 2.9 g/dL


(3.4-5.0)


 


Albumin/Globulin Ratio 0.7 (1.0-1.7)    0.9 (1.0-1.7) 


 


Lipase


 78 U/L


() 


 


 





 


Thyroid Stimulating Hormone


(TSH) 1.849 uIU/mL


(0.358-3.74) 


 


 





 


Triglycerides Level


 


 


 


 51 mg/dL


(0-150)


 


Cholesterol Level


 


 


 


 148 mg/dL


(0-200)


 


LDL Cholesterol, Calculated


 


 


 


 75 mg/dL


(0-100)


 


VLDL Cholesterol, Calculated


 


 


 


 10 mg/dL


(0-40)


 


Non-HDL Cholesterol Calculated


 


 


 


 85 mg/dL


(0-129)


 


HDL Cholesterol


 


 


 


 63 mg/dL


(40-60)


 


Cholesterol/HDL Ratio    2.3 








Medications





Active Scripts








 Medications  Dose


 Route/Sig


 Max Daily Dose Days Date Category


 


 Lisinopril 5 Mg


 Tablet  1 Tab


 PO DAILY


   1/4/20 Reported


 


 Eliquis


  (Apixaban) 2.5 Mg


 Tablet  2.5 Mg


 PO Q12HR


   1/4/20 Reported


 


 Folic Acid 20 Mg


 Capsule  1 Mg


 PO DAILY


   1/4/20 Reported


 


 Vitamin D


  (Cholecalciferol


  (Vitamin D3))


 10,000 Unit


 Capsule  50,000 Unit


 PO WEEKLY


   1/4/20 Reported


 


 Furosemide 40 Mg


 Tablet  40 Mg


 PO BID


   1/4/20 Reported


 


 Omeprazole 40 Mg


 Capsule.dr  1 Cap


 PO DAILY


   1/4/20 Reported


 


 Flomax


  (Tamsulosin Hcl)


 0.4 Mg Cap.er.24h  1 Cap


 PO BID


   1/4/20 Reported


 


 Atorvastatin


 Calcium 40 Mg


 Tablet  1 Tab


 PO QHS


   1/4/20 Reported


 


 Carvedilol **


  (Carvedilol)


 3.125 Mg Tablet  3.125 Mg


 PO BIDWMEALS


   1/4/20 Reported


 


 Vitamin B-1


  (Thiamine


 Mononitrate) 100


 Mg Tablet  1 Tab


 PO DAILY


  30 1/4/20 Reported


 


 Magnesium


  (Magnesium Oxide)


 400 Mg Capsule  1 Cap


 PO DAILY


  30 1/4/20 Reported











Impression


.


FULL NOTE DICTATED


NEEDS THORACENTSIS IN AM


RULE OUT TRANSUDATE VS EXUDATE


DOUBT PARAPNEUMONTIC EFFUSION











JOSHUA TELLES MD               Jan 5, 2020 10:49

## 2020-01-06 VITALS — DIASTOLIC BLOOD PRESSURE: 79 MMHG | SYSTOLIC BLOOD PRESSURE: 143 MMHG

## 2020-01-06 VITALS — DIASTOLIC BLOOD PRESSURE: 75 MMHG | SYSTOLIC BLOOD PRESSURE: 154 MMHG

## 2020-01-06 VITALS — SYSTOLIC BLOOD PRESSURE: 136 MMHG | DIASTOLIC BLOOD PRESSURE: 72 MMHG

## 2020-01-06 VITALS — SYSTOLIC BLOOD PRESSURE: 135 MMHG | DIASTOLIC BLOOD PRESSURE: 67 MMHG

## 2020-01-06 VITALS — SYSTOLIC BLOOD PRESSURE: 125 MMHG | DIASTOLIC BLOOD PRESSURE: 69 MMHG

## 2020-01-06 VITALS — DIASTOLIC BLOOD PRESSURE: 82 MMHG | SYSTOLIC BLOOD PRESSURE: 140 MMHG

## 2020-01-06 RX ADMIN — FUROSEMIDE SCH MG: 10 INJECTION, SOLUTION INTRAMUSCULAR; INTRAVENOUS at 17:45

## 2020-01-06 RX ADMIN — MAGNESIUM OXIDE TAB 400 MG (241.3 MG ELEMENTAL MG) SCH MG: 400 (241.3 MG) TAB at 08:29

## 2020-01-06 RX ADMIN — Medication SCH MG: at 08:28

## 2020-01-06 RX ADMIN — FUROSEMIDE SCH MG: 10 INJECTION, SOLUTION INTRAMUSCULAR; INTRAVENOUS at 08:30

## 2020-01-06 RX ADMIN — ATORVASTATIN CALCIUM SCH MG: 40 TABLET, FILM COATED ORAL at 20:08

## 2020-01-06 RX ADMIN — TAMSULOSIN HYDROCHLORIDE SCH MG: 0.4 CAPSULE ORAL at 20:08

## 2020-01-06 RX ADMIN — IPRATROPIUM BROMIDE AND ALBUTEROL SULFATE SCH ML: .5; 3 SOLUTION RESPIRATORY (INHALATION) at 11:45

## 2020-01-06 RX ADMIN — IPRATROPIUM BROMIDE AND ALBUTEROL SULFATE SCH ML: .5; 3 SOLUTION RESPIRATORY (INHALATION) at 19:10

## 2020-01-06 RX ADMIN — LISINOPRIL SCH MG: 5 TABLET ORAL at 08:29

## 2020-01-06 RX ADMIN — IPRATROPIUM BROMIDE AND ALBUTEROL SULFATE SCH ML: .5; 3 SOLUTION RESPIRATORY (INHALATION) at 04:26

## 2020-01-06 RX ADMIN — PANTOPRAZOLE SODIUM SCH MG: 40 TABLET, DELAYED RELEASE ORAL at 08:29

## 2020-01-06 RX ADMIN — TAMSULOSIN HYDROCHLORIDE SCH MG: 0.4 CAPSULE ORAL at 08:29

## 2020-01-06 RX ADMIN — IPRATROPIUM BROMIDE AND ALBUTEROL SULFATE SCH ML: .5; 3 SOLUTION RESPIRATORY (INHALATION) at 08:11

## 2020-01-06 RX ADMIN — CARVEDILOL SCH MG: 12.5 TABLET, FILM COATED ORAL at 17:45

## 2020-01-06 RX ADMIN — POTASSIUM CHLORIDE SCH MEQ: 1500 TABLET, EXTENDED RELEASE ORAL at 08:29

## 2020-01-06 RX ADMIN — CARVEDILOL SCH MG: 3.12 TABLET, FILM COATED ORAL at 08:29

## 2020-01-06 RX ADMIN — IPRATROPIUM BROMIDE AND ALBUTEROL SULFATE SCH ML: .5; 3 SOLUTION RESPIRATORY (INHALATION) at 15:17

## 2020-01-06 RX ADMIN — ASPIRIN SCH MG: 81 TABLET, COATED ORAL at 17:45

## 2020-01-06 RX ADMIN — FOLIC ACID SCH MG: 1 TABLET ORAL at 08:29

## 2020-01-06 NOTE — CONS
DATE OF CONSULTATION:  01/05/2020



ATTENDING PHYSICIAN:  Ernesto Fong MD



REASON FOR CONSULTATION:  The patient seen in pulmonary consultation at the

request of Dr. Fong for abnormal x-ray revealing right-sided pleural

effusion, increasing shortness of breath.



HISTORY OF PRESENT ILLNESS:  The patient is a 79-year-old male with a history of

tobacco use, quit about a year ago.  He has a prior history of stomach cancer

was diagnosed approximately 5 years ago.  He could not recall where the surgery

was done, presented with increasing shortness of breath over the last 5-10 days,

unable to lay down flat.  No fever, chills or night sweats.  He has a cough,

mostly nonproductive.  No hemoptysis.  He had a chest x-ray, which revealed a

significant right-sided effusion.  I was asked to see him in consultation.



PAST MEDICAL HISTORY:  Bradycardia, status post pacemaker implantation;

hypertension; coronary artery disease; gastric tumor with resection; underlying

COPD, unknown FEV1; tobacco dependence, in remission.



PAST SURGICAL HISTORY:  Previous gastric tumor resection.



SOCIAL HISTORY:  Quit tobacco a year ago.



FAMILY HISTORY:  Coronary artery disease, COPD, osteoarthritis.



ALLERGIES:  No known drug allergies.



CURRENT MEDICATIONS:  List was reviewed.



REVIEW OF SYSTEMS:

CONSTITUTIONAL:  No fever or chills.

EYES:  No change in visual acuity.

HENT:  No nasal congestion or sore throat.

PULMONARY:  As indicated above.

CARDIOVASCULAR:  No chest pain.  No pressure.

GASTROINTESTINAL:  No nausea, vomiting, diarrhea.

GENITOURINARY:  No dysuria or frequency.

MUSCULOSKELETAL:  No localized muscle aches or joint pains.

SKIN:  No new skin rashes.

NEUROLOGIC:  No headaches, diplopia or blurred vision.



PHYSICAL EXAMINATION:

GENERAL:  The patient did not appear to be in any significant respiratory

distress, currently on 2 liters of oxygen supplementation.

HEENT:  Eyes, the sclerae were nonicteric.

NECK:  Jugular venous distention was not elevated.  No lymphadenopathy.

CHEST:  Full expansion.

LUNGS:  Diminished breath sounds in the right base.

CARDIOVASCULAR:  Regular rate and rhythm with S1, S2, no S3.

ABDOMEN:  Soft, nontender, nondistended.

EXTREMITIES:  No clubbing, cyanosis or edema.

NEUROLOGICAL:  The patient was awake, alert, following commands.  A detailed

neuro exam was not performed.



LABORATORY DATA:  White count was normal.  INR was 1.2.  Electrolytes were

noted.  BUN and creatinine were normal.  Albumin was low.  AST and ALT were

normal.



IMPRESSION:

1.  Right-sided effusion.

2.  Progressive dyspnea secondary to above.

3.  Chronic obstructive pulmonary disease.

4.  Coronary artery disease.

5.  Bradycardia status post pacemaker.

6.  Acute respiratory failure secondary to above.



PLAN:

1.  We will proceed with diagnostic thoracentesis.  Clinically, I do not think

that this is related to parapneumonic effusion.

2.  Rule out possibility of a malignant effusion.

3.  Obtain echocardiogram, possibly related to CHF.



I do appreciate the privilege in sharing in patient's care.

 



______________________________

JOSHUA TELLES MD



DR:  MANUEL/[email or web address]  JOB#:  [id number] / [id number]

DD:  01/05/2020 10:47  DT:  01/06/2020 00:44

## 2020-01-06 NOTE — PDOC
CHARANJIT ALVAREZ APRN 1/6/20 1559:


CARDIO Progress Notes


Date and Time


Date of Service


1/6/20


Time of Evaluation


0779





Subjective


Subjective:  No Chest Pain, No shortness of breath, No Palpitations, Other 

(breathing improved. )





Vitals


Vitals





Vital Signs








  Date Time  Temp Pulse Resp B/P (MAP) Pulse Ox O2 Delivery O2 Flow Rate FiO2


 


1/6/20 15:15 97.5 80 18 154/75 (101) 97 Nasal Cannula 2.0 





 97.5       








Weight


Weight [ ]





Input and Output


Intake and Output











Intake and Output 


 


 1/6/20





 07:00


 


Intake Total 180 ml


 


Output Total 800 ml


 


Balance -620 ml


 


 


 


Intake Oral 180 ml


 


Output Urine Total 800 ml


 


# Voids 1











Physical Exam


HEENT:  Neck Supple W Full Motion


Chest:  Symmetric


LUNGS:  Other (diminished )


Heart:  S1S2, RRR, murmurs (2/6 systolic murmur )


Abdomen:  Soft N/T


Extremities:  Other (trace LE edema bilaterally)


Neurology:  alert, oriented, follow commands





Assessment


Assessment


1.  Dyspnea with a/c CHF and pleural effusion. Thoracentesiss postponed until 

tomorrow as patient had Eliquis last night. 


2.  Acute on chronic systolic CHF. better compensated following diuresis 


3.  Mixed ischemic/ non-ischemic cardiomyopathy; Echo showed LVEF 15% . Previous

echo 6/2019 with LVEF 30%. Cath 6/2019 with moderate, diffuse CAD as noted 

below.. Follows with MAC


4.  CAD s/p PCI/stent to the RCA 2009; CP free. 


5.  SSS s/p PPM ([employer]). Device check 6/19 with normal function.


6.  Accelerated hypertension:  Better controlled, but remains elevated 


7.  PAFIB ; predominantly SR, rate controlled. Continue eliquis for stroke 

prophylaxis.


8.  Hyperlipidemia:  Continue statin therapy





Recommendations





Ongoing diuresis


Hold Eliquis; resume following thoracentesis 


Continue secondary prevention measures. 


Increase coreg


Consider Entresto 


Consider upgrade to AICD for primary prevention of SCD 











Records from :








Cardiac Catheterization 


DATE: 06/25/2019





ANATOMY: LEFT MAIN: Left main arose from left coronary cusp. It had 10% to 15% 

narrowing distally. It was heavily calcified. It bifurcated into the left 

anterior descending and circumflex. 


LEFT ANTERIOR DESCENDING: The left anterior descending is heavily calcified, has

60% to perhaps 70% stenosis in the proximal portion. The LAD continues on 

supplied diagonal which is small diffusely disease and then a mid vessel has a 

60% stenosis. This is a type 3 LAD. There is minimal disease distally. 


CIRCUMFLEX: The circumflex has mild irregularities supplying a 1st obtuse 

marginal which is unremarkable, and a 2nd obtuse marginal which is unremarkable.




RIGHT CORONARY ARTERY: The right coronary is dominant arising from right 

coronary cusp. It has heavy calcification with 30% stenosis in the midportion 

and a 40% to 50% just before the posterior descending and posterolateral 

arteries. The posterior descending and posterolateral arteries have mild diffuse

irregularities. 





CONCLUSIONS: 


Diffuse moderate coronary artery disease. In comparison to previous angiogram, 

there is minimal change, although proximal left anterior descending may be 

slightly more severe.


Profound hypotensive response to vasodilators and sedation. This did respond 

slowly to epinephrine and Caleb-Synephrine.








6/24/19 - 2-D + DOPPLER ECHOCARDIOGRAM





* Severely reduced left ventricular systolic function, estimated ejection 

  fraction is 30%. 


* The LV is mildly dilated, severe global hypokinesis. 


* Grade II (moderate) left ventricular diastolic dysfunction. Elevated left atri

  al pressure. 


* The right ventricle is normal size and function. M-Mode TAPSE 1.66 cm (normal 

  >1.7 cm). 


* Left Atrium: Moderately dilated. 


* No significant valvular abnormalities. 


* Small circumferential pericardial effusion. Left and right pleural effusion. 


* Estimated Peak Systolic PA Pressure 31 mmHg





XIMENA RECIO MD 1/6/20 1734:


CARDIO Progress Notes


Assessment


Assessment


Patient seen and examined. Agree with NP's assessment and plan. 


Acute on chronic systolic heart failure better compensated


2-D echo showed LVEF 15%, diminished compared to last 2-D echocardiogram


Recent cardiac catheterization results noted above


We will consider initiating entresto prior to discharge to optimize therapy 


Defer PM upgrade to AICD to primary cardiologist











CHARANJIT ALVAREZ            Jan 6, 2020 15:59


XIMENA RECIO MD            Jan 6, 2020 17:34

## 2020-01-06 NOTE — PDOC
PROGRESS NOTES


History of Present Illness


History of Present Illness





VTE Prophylaxis Ordered


VTE Prophylaxis Devices:  No


VTE Pharmacological Prophylaxi:  Yes





Assessment/Plan


Assessment/Plan


 


IMPRESSION: 








1. Moderate right and small left pleural effusions. Mild pulmonary edema.


2. Acute hypoxic resp failure


3. COPD


4. HYPERTENSIVE URGENCY


5, PACEMAKER PLACEMENT, REMOTE











PLAN





ADMIT


CONSULT CARDIOLOGY


CONSULT PULM


DVT PROPHYLAXIS


IV DIURESIS, LASIX 40MG IV BID


IV HYDRALAZINE PRN BP SUPPORT


ECHO


TSH


THORACENTESIS 1/6 1/6 STILL SOA





Vitals


Vitals





Vital Signs








  Date Time  Temp Pulse Resp B/P (MAP) Pulse Ox O2 Delivery O2 Flow Rate FiO2


 


1/6/20 11:08 97.5 66 18 140/82 (101) 96 Nasal Cannula 2.0 





 97.5       











Physical Exam


General:  Alert, Oriented X3, Cooperative, No acute distress


Heart:  Regular rate, Other (heart rate is irregular)


Lungs:  Clear


Abdomen:  Normal bowel sounds, Soft, No tenderness


Extremities:  No cyanosis, Other (trace edema)





Labs


LABS


Supine to Sit Assistance Required 


* Independent


Sit to Supine Assistance Required 


* Independent


Transfer Assistance Required 


* Independent


Transfer Type 


* Stand-Step


Transfer Assistive Device 


* No Device


Ambulation Assistance Required 


* Independent


Ambulation Assistive Device 


* Cane


Ambulation Distance 


* 100 Ft


Ambulation Comments 


* No loss of balance no deviation from straight path even when backing and 


   turning, unilateral mini-squats bilateral LEs holding cane only..


Stairs Assistance Required 


* Independent


Number of Stairs 


* 2-4


Stairs Comments 


* simulated with unilateral minisquats. 


Clinical Presentation 


* Stable


Evaluation Complexity Level 


* Low Complexity


Pt/caregiver agrees with plan of care/goals 


* Yes


Patient condition at conclusion of therapy 


* Pt in chair


* Call light in reach


* Phone in reach


* PtIn no apparent distress


* Pt denies further needs


No Further Skilled P.T. Intervention Required 


* Eval only-No PT Needs


Discharge Recommendations 


* Home independent


Discharge Recommendation Comments 


* pt owns a cane.





Assessment and Plan


Assessmemt and Plan


Problems


Medical Problems:


(1) CHF exacerbation


Status: Acute  





(2) Hypokalemia


Status: Acute  





(3) Hypoxia


Status: Acute  











Comment


Review of Relevant


I have reviewed the following items dao (where applicable) has been applied.


Labs





Laboratory Tests








Test


 1/4/20


16:00 1/4/20


19:00 1/5/20


04:21


 


Lactic Acid Level


 2.0 mmol/L


(0.4-2.0) 


 





 


Troponin I Quantitative


 < 0.017 ng/mL


(0.000-0.055) < 0.017 ng/mL


(0.000-0.055) 





 


White Blood Count


 


 


 5.3 x10^3/uL


(4.0-11.0)


 


Red Blood Count


 


 


 4.27 x10^6/uL


(4.30-5.70)


 


Hemoglobin


 


 


 10.9 g/dL


(13.0-17.5)


 


Hematocrit


 


 


 34.8 %


(39.0-53.0)


 


Mean Corpuscular Volume   81 fL () 


 


Mean Corpuscular Hemoglobin   26 pg (25-35) 


 


Mean Corpuscular Hemoglobin


Concent 


 


 31 g/dL


(31-37)


 


Red Cell Distribution Width


 


 


 16.1 %


(11.5-14.5)


 


Platelet Count


 


 


 224 x10^3/uL


(140-400)


 


Neutrophils (%) (Auto)   79 % (31-73) 


 


Lymphocytes (%) (Auto)   9 % (24-48) 


 


Monocytes (%) (Auto)   11 % (0-9) 


 


Eosinophils (%) (Auto)   1 % (0-3) 


 


Basophils (%) (Auto)   0 % (0-3) 


 


Neutrophils # (Auto)


 


 


 4.2 x10^3/uL


(1.8-7.7)


 


Lymphocytes # (Auto)


 


 


 0.5 x10^3/uL


(1.0-4.8)


 


Monocytes # (Auto)


 


 


 0.6 x10^3/uL


(0.0-1.1)


 


Eosinophils # (Auto)


 


 


 0.0 x10^3/uL


(0.0-0.7)


 


Basophils # (Auto)


 


 


 0.0 x10^3/uL


(0.0-0.2)


 


Sodium Level


 


 


 141 mmol/L


(136-145)


 


Potassium Level


 


 


 4.0 mmol/L


(3.5-5.1)


 


Chloride Level


 


 


 105 mmol/L


()


 


Carbon Dioxide Level


 


 


 29 mmol/L


(21-32)


 


Anion Gap   7 (6-14) 


 


Blood Urea Nitrogen


 


 


 18 mg/dL


(8-26)


 


Creatinine


 


 


 1.0 mg/dL


(0.7-1.3)


 


Estimated GFR


(Cockcroft-Gault) 


 


 87.2 





 


BUN/Creatinine Ratio   18 (6-20) 


 


Glucose Level


 


 


 124 mg/dL


(70-99)


 


Calcium Level


 


 


 7.0 mg/dL


(8.5-10.1)


 


Total Bilirubin


 


 


 0.3 mg/dL


(0.2-1.0)


 


Aspartate Amino Transf


(AST/SGOT) 


 


 26 U/L (15-37) 





 


Alanine Aminotransferase


(ALT/SGPT) 


 


 22 U/L (16-63) 





 


Alkaline Phosphatase


 


 


 104 U/L


()


 


Total Protein


 


 


 6.1 g/dL


(6.4-8.2)


 


Albumin


 


 


 2.9 g/dL


(3.4-5.0)


 


Albumin/Globulin Ratio   0.9 (1.0-1.7) 


 


Triglycerides Level


 


 


 51 mg/dL


(0-150)


 


Cholesterol Level


 


 


 148 mg/dL


(0-200)


 


LDL Cholesterol, Calculated


 


 


 75 mg/dL


(0-100)


 


VLDL Cholesterol, Calculated


 


 


 10 mg/dL


(0-40)


 


Non-HDL Cholesterol Calculated


 


 


 85 mg/dL


(0-129)


 


HDL Cholesterol


 


 


 63 mg/dL


(40-60)


 


Cholesterol/HDL Ratio   2.3 








Medications





Current Medications


Aspirin (Skout Aspirin) 325 mg 1X  ONCE PO  Last administered on 1/4/20at 11:30;

 Start 1/4/20 at 11:30;  Stop 1/4/20 at 11:31;  Status DC


Nitroglycerin (Nitro-Bid Oint) 0.5 inch 1X  ONCE TP  Last administered on 

1/4/20at 11:30;  Start 1/4/20 at 11:30;  Stop 1/4/20 at 11:31;  Status DC


Albuterol/ Ipratropium (Duoneb) 3 ml 1X  ONCE NEB  Last administered on 1/4/20at

11:29;  Start 1/4/20 at 11:30;  Stop 1/4/20 at 11:31;  Status DC


Furosemide (Lasix) 40 mg 1X  ONCE IVP  Last administered on 1/4/20at 12:48;  

Start 1/4/20 at 12:30;  Stop 1/4/20 at 12:31;  Status DC


Potassium Chloride (Klor-Con) 40 meq 1X  ONCE PO  Last administered on 1/4/20at 

12:48;  Start 1/4/20 at 12:30;  Stop 1/4/20 at 12:31;  Status DC


Ondansetron HCl (Zofran) 4 mg PRN Q8HRS  PRN IV NAUSEA/VOMITING;  Start 1/4/20 

at 12:45;  Stop 1/4/20 at 13:45;  Status DC


Acetaminophen (Tylenol) 650 mg PRN Q4HRS  PRN PO FEVER;  Start 1/4/20 at 12:45; 

Stop 1/4/20 at 13:45;  Status DC


Sodium Chloride (Normal Saline Flush) 3 ml QSHIFT  PRN IV AFTER MEDS AND BLOOD 

DRAWS;  Start 1/4/20 at 13:45


Ondansetron HCl (Zofran) 4 mg PRN Q4HRS  PRN IV NAUSEA/VOMITING;  Start 1/4/20 

at 13:45


Acetaminophen (Tylenol) 650 mg PRN Q4HRS  PRN PO TEMP OVER 100.4F OR MILD PAIN; 

Start 1/4/20 at 13:45


Al Hydroxide/Mg Hydroxide (Mylanta Plus Xs) 30 ml PRN DAILY  PRN PO HEARTBURN / 

GAS;  Start 1/4/20 at 13:45


Clonidine HCl (Catapres) 0.1 mg PRN Q6HRS  PRN PO SBP>160 OR DBP>90;  Start 

1/4/20 at 13:45


Docusate Sodium (Colace) 100 mg PRN BID  PRN PO CONSTIPATION;  Start 1/4/20 at 

13:45


Albuterol/ Ipratropium (Duoneb) 3 ml Q4H NEB  Last administered on 1/6/20at 

08:11;  Start 1/4/20 at 16:00


Guaifenesin (Robitussin) 200 mg PRN Q4HRS  PRN PO COUGH;  Start 1/4/20 at 13:45


Lorazepam (Ativan) 0.5 mg PRN Q4HRS  PRN PO ANXIETY / AGITATION;  Start 1/4/20 

at 13:45


Enoxaparin Sodium (Lovenox 40mg Syringe) 40 mg DAILY SQ ;  Start 1/5/20 at 

09:00;  Stop 1/4/20 at 15:50;  Status DC


Furosemide (Lasix) 40 mg BID92 IVP  Last administered on 1/6/20at 08:30;  Start 

1/4/20 at 14:00


Potassium Chloride (Klor-Con) 40 meq 1X  ONCE PO ;  Start 1/4/20 at 14:15;  Stop

1/4/20 at 14:16;  Status DC


Potassium Chloride (Klor-Con) 20 meq DAILY PO  Last administered on 1/6/20at 

08:29;  Start 1/5/20 at 09:00


Hydralazine HCl (Apresoline Inj) 10 mg PRN Q4HRS  PRN IVP ELEVATED BP, SEE CO

MMENTS;  Start 1/4/20 at 14:00


Lisinopril (Prinivil) 5 mg BID PO ;  Start 1/4/20 at 21:00;  Stop 1/4/20 at 

15:50;  Status DC


Apixaban (Eliquis) 2.5 mg Q12HR PO  Last administered on 1/5/20at 20:45;  Start 

1/4/20 at 21:00


Atorvastatin Calcium (Lipitor) 40 mg QHS PO  Last administered on 1/5/20at 

20:45;  Start 1/4/20 at 21:00


Carvedilol (Coreg) 3.125 mg BIDWMEALS PO ;  Start 1/5/20 at 21:00;  Stop 1/4/20 

at 19:11;  Status DC


Furosemide (Lasix) 40 mg BID PO ;  Start 1/4/20 at 21:00;  Stop 1/4/20 at 19:16;

 Status DC


Tamsulosin HCl (Flomax) 0.4 mg BID PO  Last administered on 1/6/20at 08:29;  

Start 1/4/20 at 21:00


Thiamine Mononitrate (Vitamin B-1) 100 mg DAILY PO  Last administered on 

1/6/20at 08:28;  Start 1/5/20 at 09:00


Ergocalciferol (Vitamin D2) 50,000 unit WEEKLY PO ;  Start 1/11/20 at 09:00


Folic Acid (Folic Acid) 1 mg DAILY PO  Last administered on 1/6/20at 08:29;  

Start 1/5/20 at 09:00


Magnesium Oxide (Magnesium Oxide) 400 mg DAILY PO  Last administered on 1/6/20at

08:29;  Start 1/5/20 at 09:00


Pantoprazole Sodium (Protonix) 40 mg DAILYAC PO  Last administered on 1/6/20at 

08:29;  Start 1/5/20 at 07:30


Lisinopril (Prinivil) 5 mg DAILY PO ;  Start 1/5/20 at 09:00;  Stop 1/4/20 at 

19:13;  Status DC


Carvedilol (Coreg) 3.125 mg BIDWMEALS PO  Last administered on 1/6/20at 08:29;  

Start 1/4/20 at 21:00


Lisinopril (Prinivil) 5 mg DAILY PO  Last administered on 1/6/20at 08:29;  Start

1/4/20 at 21:00


Info (Anti-Coagulation Monitoring By Pharmacy) 1 each PRN DAILY  PRN [facility] SEE 

COMMENTS;  Start 1/5/20 at 08:15





Active Scripts


Active


Reported


Lisinopril 5 Mg Tablet 1 Tab PO DAILY


Eliquis (Apixaban) 2.5 Mg Tablet 2.5 Mg PO Q12HR


Folic Acid 20 Mg Capsule 1 Mg PO DAILY


Vitamin D (Cholecalciferol (Vitamin D3)) 10,000 Unit Capsule 50,000 Unit PO 

WEEKLY


Furosemide 40 Mg Tablet 40 Mg PO BID


Omeprazole 40 Mg Capsule.dr 1 Cap PO DAILY


Flomax (Tamsulosin Hcl) 0.4 Mg Cap.er.24h 1 Cap PO BID


Atorvastatin Calcium 40 Mg Tablet 1 Tab PO QHS


Carvedilol ** (Carvedilol) 3.125 Mg Tablet 3.125 Mg PO BIDWMEALS


Vitamin B-1 (Thiamine Mononitrate) 100 Mg Tablet 1 Tab PO DAILY 30 Days


Magnesium (Magnesium Oxide) 400 Mg Capsule 1 Cap PO DAILY 30 Days


Vitals/I & O





Vital Sign - Last 24 Hours








 1/5/20 1/5/20 1/5/20 1/5/20





 15:00 15:57 18:32 19:00


 


Temp 98.0   98.1





 98.0   98.1


 


Pulse 64  74 64


 


Resp 21   20


 


B/P (MAP) 157/92 (113)  172/85 154/79 (104)


 


Pulse Ox 98 94  98


 


O2 Delivery Nasal Cannula Nasal Cannula  Nasal Cannula


 


O2 Flow Rate 3.0 3.0  3.0


 


    





    





 1/5/20 1/5/20 1/5/20 1/5/20





 19:53 21:01 23:00 23:54


 


Temp   98.3 





   98.3 


 


Pulse   83 


 


Resp   28 22


 


B/P (MAP)   160/84 (109) 


 


Pulse Ox  98 97 


 


O2 Delivery Nasal Cannula Nasal Cannula Nasal Cannula 


 


O2 Flow Rate 2.0 3.0 3.0 


 


    





    





 1/6/20 1/6/20 1/6/20 1/6/20





 03:00 07:00 08:00 08:11


 


Temp 97.4 98.3  





 97.4 98.3  


 


Pulse 63 78  


 


Resp 16 18  


 


B/P (MAP) 135/67 (89) 143/79 (100)  


 


Pulse Ox 95 97  98


 


O2 Delivery Nasal Cannula Nasal Cannula Nasal Cannula Nasal Cannula


 


O2 Flow Rate 3.0 2.0 3.0 3.0


 


    





    





 1/6/20 1/6/20 1/6/20 





 08:29 08:29 11:08 


 


Temp   97.5 





   97.5 


 


Pulse 78 78 66 


 


Resp   18 


 


B/P (MAP) 143/79 143/79 140/82 (101) 


 


Pulse Ox   96 


 


O2 Delivery   Nasal Cannula 


 


O2 Flow Rate   2.0 














Intake and Output   


 


 1/5/20 1/5/20 1/6/20





 15:00 23:00 07:00


 


Intake Total 180 ml 0 ml 0 ml


 


Output Total  700 ml 100 ml


 


Balance 180 ml -700 ml -100 ml

















HERMINIO WILKERSON MD           Jan 6, 2020 13:09

## 2020-01-06 NOTE — PDOC
PULMONARY PROGRESS NOTES


Subjective


PT FEELS BETTER AFTER TAP NOT MORE SOA


Vitals





Vital Signs








  Date Time  Temp Pulse Resp B/P (MAP) Pulse Ox O2 Delivery O2 Flow Rate FiO2


 


1/6/20 08:29  78  143/79    


 


1/6/20 08:11     98 Nasal Cannula 3.0 


 


1/6/20 07:00 98.3  18     





 98.3       








ROS:  No Nausea, No Chest Pain, No Abdominal Pain, No Increase Cough


General:  Alert


Lungs:  Crackles


Cardiovascular:  S1, S2


Abdomen:  Soft, Non-tender


Neuro Exam:  Alert


Extremities:  No Edema


Skin:  Warm


Labs





Laboratory Tests








Test


 1/4/20


11:12 1/4/20


16:00 1/4/20


19:00 1/5/20


04:21


 


White Blood Count


 5.1 x10^3/uL


(4.0-11.0) 


 


 5.3 x10^3/uL


(4.0-11.0)


 


Red Blood Count


 4.83 x10^6/uL


(4.30-5.70) 


 


 4.27 x10^6/uL


(4.30-5.70)


 


Hemoglobin


 12.5 g/dL


(13.0-17.5) 


 


 10.9 g/dL


(13.0-17.5)


 


Hematocrit


 39.4 %


(39.0-53.0) 


 


 34.8 %


(39.0-53.0)


 


Mean Corpuscular Volume 82 fL ()    81 fL () 


 


Mean Corpuscular Hemoglobin 26 pg (25-35)    26 pg (25-35) 


 


Mean Corpuscular Hemoglobin


Concent 32 g/dL


(31-37) 


 


 31 g/dL


(31-37)


 


Red Cell Distribution Width


 16.1 %


(11.5-14.5) 


 


 16.1 %


(11.5-14.5)


 


Platelet Count


 259 x10^3/uL


(140-400) 


 


 224 x10^3/uL


(140-400)


 


Neutrophils (%) (Auto) 74 % (31-73)    79 % (31-73) 


 


Lymphocytes (%) (Auto) 14 % (24-48)    9 % (24-48) 


 


Monocytes (%) (Auto) 10 % (0-9)    11 % (0-9) 


 


Eosinophils (%) (Auto) 1 % (0-3)    1 % (0-3) 


 


Basophils (%) (Auto) 1 % (0-3)    0 % (0-3) 


 


Neutrophils # (Auto)


 3.8 x10^3/uL


(1.8-7.7) 


 


 4.2 x10^3/uL


(1.8-7.7)


 


Lymphocytes # (Auto)


 0.7 x10^3/uL


(1.0-4.8) 


 


 0.5 x10^3/uL


(1.0-4.8)


 


Monocytes # (Auto)


 0.5 x10^3/uL


(0.0-1.1) 


 


 0.6 x10^3/uL


(0.0-1.1)


 


Eosinophils # (Auto)


 0.0 x10^3/uL


(0.0-0.7) 


 


 0.0 x10^3/uL


(0.0-0.7)


 


Basophils # (Auto)


 0.0 x10^3/uL


(0.0-0.2) 


 


 0.0 x10^3/uL


(0.0-0.2)


 


Prothrombin Time


 14.5 SEC


(11.7-14.0) 


 


 





 


Prothromb Time International


Ratio 1.2 (0.8-1.1) 


 


 


 





 


Activated Partial


Thromboplast Time 37 SEC (24-38) 


 


 


 





 


Sodium Level


 142 mmol/L


(136-145) 


 


 141 mmol/L


(136-145)


 


Potassium Level


 3.4 mmol/L


(3.5-5.1) 


 


 4.0 mmol/L


(3.5-5.1)


 


Chloride Level


 104 mmol/L


() 


 


 105 mmol/L


()


 


Carbon Dioxide Level


 26 mmol/L


(21-32) 


 


 29 mmol/L


(21-32)


 


Anion Gap 12 (6-14)    7 (6-14) 


 


Blood Urea Nitrogen


 18 mg/dL


(8-26) 


 


 18 mg/dL


(8-26)


 


Creatinine


 1.1 mg/dL


(0.7-1.3) 


 


 1.0 mg/dL


(0.7-1.3)


 


Estimated GFR


(Cockcroft-Gault) 78.1 


 


 


 87.2 





 


BUN/Creatinine Ratio 16 (6-20)    18 (6-20) 


 


Glucose Level


 137 mg/dL


(70-99) 


 


 124 mg/dL


(70-99)


 


Lactic Acid Level


 2.0 mmol/L


(0.4-2.0) 2.0 mmol/L


(0.4-2.0) 


 





 


Calcium Level


 7.2 mg/dL


(8.5-10.1) 


 


 7.0 mg/dL


(8.5-10.1)


 


Magnesium Level


 2.0 mg/dL


(1.8-2.4) 


 


 





 


Total Bilirubin


 0.3 mg/dL


(0.2-1.0) 


 


 0.3 mg/dL


(0.2-1.0)


 


Aspartate Amino Transf


(AST/SGOT) 30 U/L (15-37) 


 


 


 26 U/L (15-37) 





 


Alanine Aminotransferase


(ALT/SGPT) 22 U/L (16-63) 


 


 


 22 U/L (16-63) 





 


Alkaline Phosphatase


 122 U/L


() 


 


 104 U/L


()


 


Creatine Kinase


 664 U/L


() 


 


 





 


Creatine Kinase MB (Mass)


 7.1 ng/mL


(0.0-3.6) 


 


 





 


Creatine Kinase MB Relative


Index 1.1 % (0-4) 


 


 


 





 


Troponin I Quantitative


 < 0.017 ng/mL


(0.000-0.055) < 0.017 ng/mL


(0.000-0.055) < 0.017 ng/mL


(0.000-0.055) 





 


NT-Pro-B-Type Natriuretic


Peptide 30775 pg/mL


(0-449) 


 


 





 


Total Protein


 7.6 g/dL


(6.4-8.2) 


 


 6.1 g/dL


(6.4-8.2)


 


Albumin


 3.2 g/dL


(3.4-5.0) 


 


 2.9 g/dL


(3.4-5.0)


 


Albumin/Globulin Ratio 0.7 (1.0-1.7)    0.9 (1.0-1.7) 


 


Lipase


 78 U/L


() 


 


 





 


Thyroid Stimulating Hormone


(TSH) 1.849 uIU/mL


(0.358-3.74) 


 


 





 


Triglycerides Level


 


 


 


 51 mg/dL


(0-150)


 


Cholesterol Level


 


 


 


 148 mg/dL


(0-200)


 


LDL Cholesterol, Calculated


 


 


 


 75 mg/dL


(0-100)


 


VLDL Cholesterol, Calculated


 


 


 


 10 mg/dL


(0-40)


 


Non-HDL Cholesterol Calculated


 


 


 


 85 mg/dL


(0-129)


 


HDL Cholesterol


 


 


 


 63 mg/dL


(40-60)


 


Cholesterol/HDL Ratio    2.3 








Medications





Active Scripts








 Medications  Dose


 Route/Sig


 Max Daily Dose Days Date Category


 


 Lisinopril 5 Mg


 Tablet  1 Tab


 PO DAILY


   1/4/20 Reported


 


 Eliquis


  (Apixaban) 2.5 Mg


 Tablet  2.5 Mg


 PO Q12HR


   1/4/20 Reported


 


 Folic Acid 20 Mg


 Capsule  1 Mg


 PO DAILY


   1/4/20 Reported


 


 Vitamin D


  (Cholecalciferol


  (Vitamin D3))


 10,000 Unit


 Capsule  50,000 Unit


 PO WEEKLY


   1/4/20 Reported


 


 Furosemide 40 Mg


 Tablet  40 Mg


 PO BID


   1/4/20 Reported


 


 Omeprazole 40 Mg


 Capsule.dr  1 Cap


 PO DAILY


   1/4/20 Reported


 


 Flomax


  (Tamsulosin Hcl)


 0.4 Mg Cap.er.24h  1 Cap


 PO BID


   1/4/20 Reported


 


 Atorvastatin


 Calcium 40 Mg


 Tablet  1 Tab


 PO QHS


   1/4/20 Reported


 


 Carvedilol **


  (Carvedilol)


 3.125 Mg Tablet  3.125 Mg


 PO BIDWMEALS


   1/4/20 Reported


 


 Vitamin B-1


  (Thiamine


 Mononitrate) 100


 Mg Tablet  1 Tab


 PO DAILY


  30 1/4/20 Reported


 


 Magnesium


  (Magnesium Oxide)


 400 Mg Capsule  1 Cap


 PO DAILY


  30 1/4/20 Reported











Impression


.





IMPRESSION:


1.  Right-sided effusion. S/P THORACENTESIS 1/6


2.  Progressive dyspnea secondary to above.


3.  Chronic obstructive pulmonary disease.


4.  Coronary artery disease.


5.  Bradycardia status post pacemaker.


6.  Acute respiratory failure secondary to above.





Plan


.


FOLLOW ANAYSIS


1.  We will proceed with diagnostic thoracentesis.  Clinically, I do not think


that this is related to parapneumonic effusion.


2.  Rule out possibility of a malignant effusion.


3.  Obtain echocardiogram, possibly related to CHF.











JOSHUA TELLES MD               Jan 6, 2020 10:09

## 2020-01-06 NOTE — NUR
SS following for discharge planning. SS reviewed pt chart. Pt is from home with spouse and 
is currently requiring oxygen. PT/OT recommended home independent at discharge. SS will 
continue to follow for discharge planning.

## 2020-01-06 NOTE — CARD
MR#: [id number]

Account#: [id number]

Accession#: [id number]

Date of Study: 01/06/2020

Ordering Physician: HERMINIO WILKERSON, 

Referring Physician: HERMINIO WILKERSON, 

Tech: Indira Bonilla RDCS





--------------- APPROVED REPORT --------------





EXAM: Two-dimensional and M-mode echocardiogram with Doppler and color Doppler.



Other Information 

Quality : Good



INDICATION

Congestive Heart Failure 

Pacemaker



2D DIMENSIONS 

RVDd2.5 (2.9-3.5cm)Left Atrium(2D)4.0 (1.6-4.0cm)

IVSd1.3 (0.7-1.1cm)Aortic Root(2D)3.2 (2.0-3.7cm)

LVDd5.2 (3.9-5.9cm)LVOT Diameter2.1 (1.8-2.4cm)

PWd1.1 (0.7-1.1cm)LVDs4.9 (2.5-4.0cm)

FS (%) 4.9 %SV14.0 ml

LVEF(%)10.9 (>50%)



Aortic Valve

AoV Peak Louis.96.1cm/sAoV VTI16.0cm

AO Peak GR.3.7mmHgLVOT Peak Louis.66.0cm/s

AO Mean GR.2mmHgAVA (VMAX)2.38cm2

POLLO   (VTI)2.98la7TI P 1/2 [id number]



Mitral Valve

MV E Wwaibjza51.4cm/sMV DECEL [id number]

MV A Hclljfil13.3cm/sE/A  Ratio0.9



Tricuspid Valve

TR P. [id number]/sRAP [id number]

TR Peak Gr.44xtFnROVC65siGv



Pulmonary Vein

S1 Dwjxbwak31.6cm/sD2 Zfsrjupx24.7cm/s



 LEFT VENTRICLE 

The left ventricle is normal size. There is mild concentric left ventricular hypertrophy. Left ventri
jazlyn systolic function is severely impaired. Akinetic base to mid inferior wall. The Ejection Fraction
 is 15%. Apical motion consistent with pacemaker activation. There is severe global hypokinesis of th
e left ventricle. Transmitral Doppler flow pattern is Grade I-abnormal relaxation pattern.



 RIGHT VENTRICLE 

The right ventricle is normal size. The right ventricular systolic function is normal. There is a pac
emaker lead in the right ventricle.



 ATRIA 

The left atrium is mildly dilated. A pacemaker is seen in the right atrium consistent with history. T
he right atrium is mildly dilated. The interatrial septum is intact with no evidence for an atrial se
ptal defect or patent foramen ovale as noted on 2-D or Doppler imaging.



 AORTIC VALVE 

The aortic valve is mildly thickened but opens well. Doppler and Color Flow revealed trace aortic reg
urgitation. There is no significant aortic valvular stenosis.



 MITRAL VALVE 

The mitral valve is thickened but opens well. There is no evidence of mitral valve prolapse. There is
 no mitral valve stenosis. Doppler and Color-flow revealed trace mitral regurgitation.



 TRICUSPID VALVE 

The tricuspid valve is normal in structure and function. Doppler and Color Flow revealed mild tricusp
id regurgitation. There is moderate pulmonary hypertension. The PA pressure was estimated at 50 mmHg.
 There is no tricuspid valve stenosis.



 PULMONIC VALVE 

The pulmonic valve is not well visualized. Doppler and Color Flow revealed no pulmonic valvular regur
gitation. There is no pulmonic valvular stenosis.



 GREAT VESSELS 

The aortic root is normal in size. The ascending aorta is normal in size. The IVC is normal in size a
nd collapses >50% with inspiration.



 PERICARDIAL EFFUSION 

There is no evidence of significant pericardial effusion.



Critical Notification

Critical Value: No



<Conclusion>

Left ventricle systolic function is severely impaired.

Akinetic base to mid inferior wall.

The Ejection Fraction is 15%.

Transmitral Doppler flow pattern is Grade I-abnormal relaxation pattern.

There is a pacemaker lead in the right atrium and right ventricle.

Trace mitral regurgitation.

Mild tricuspid regurgitation.

The PA pressure was estimated at 50 mmHg.

There is no evidence of significant pericardial effusion.



Signed by : Lester Castro, 

Electronically Approved : 01/06/2020 14:16:16

## 2020-01-07 VITALS — DIASTOLIC BLOOD PRESSURE: 75 MMHG | SYSTOLIC BLOOD PRESSURE: 130 MMHG

## 2020-01-07 VITALS — DIASTOLIC BLOOD PRESSURE: 60 MMHG | SYSTOLIC BLOOD PRESSURE: 128 MMHG

## 2020-01-07 VITALS — SYSTOLIC BLOOD PRESSURE: 110 MMHG | DIASTOLIC BLOOD PRESSURE: 72 MMHG

## 2020-01-07 VITALS — SYSTOLIC BLOOD PRESSURE: 164 MMHG | DIASTOLIC BLOOD PRESSURE: 83 MMHG

## 2020-01-07 VITALS — SYSTOLIC BLOOD PRESSURE: 135 MMHG | DIASTOLIC BLOOD PRESSURE: 72 MMHG

## 2020-01-07 VITALS — SYSTOLIC BLOOD PRESSURE: 133 MMHG | DIASTOLIC BLOOD PRESSURE: 88 MMHG

## 2020-01-07 VITALS — DIASTOLIC BLOOD PRESSURE: 64 MMHG | SYSTOLIC BLOOD PRESSURE: 116 MMHG

## 2020-01-07 VITALS — SYSTOLIC BLOOD PRESSURE: 126 MMHG | DIASTOLIC BLOOD PRESSURE: 68 MMHG

## 2020-01-07 VITALS — SYSTOLIC BLOOD PRESSURE: 123 MMHG | DIASTOLIC BLOOD PRESSURE: 66 MMHG

## 2020-01-07 VITALS — DIASTOLIC BLOOD PRESSURE: 75 MMHG | SYSTOLIC BLOOD PRESSURE: 157 MMHG

## 2020-01-07 VITALS — SYSTOLIC BLOOD PRESSURE: 141 MMHG | DIASTOLIC BLOOD PRESSURE: 69 MMHG

## 2020-01-07 VITALS — SYSTOLIC BLOOD PRESSURE: 173 MMHG | DIASTOLIC BLOOD PRESSURE: 78 MMHG

## 2020-01-07 VITALS — DIASTOLIC BLOOD PRESSURE: 72 MMHG | SYSTOLIC BLOOD PRESSURE: 156 MMHG

## 2020-01-07 VITALS — DIASTOLIC BLOOD PRESSURE: 87 MMHG | SYSTOLIC BLOOD PRESSURE: 136 MMHG

## 2020-01-07 VITALS — DIASTOLIC BLOOD PRESSURE: 64 MMHG | SYSTOLIC BLOOD PRESSURE: 119 MMHG

## 2020-01-07 LAB
ANION GAP SERPL CALC-SCNC: 8 MMOL/L (ref 6–14)
BASOPHILS # BLD AUTO: 0 X10^3/UL (ref 0–0.2)
BASOPHILS NFR BLD: 1 % (ref 0–3)
BUN SERPL-MCNC: 19 MG/DL (ref 8–26)
CALCIUM SERPL-MCNC: 6.5 MG/DL (ref 8.5–10.1)
CHLORIDE SERPL-SCNC: 103 MMOL/L (ref 98–107)
CO2 SERPL-SCNC: 29 MMOL/L (ref 21–32)
CREAT SERPL-MCNC: 1.2 MG/DL (ref 0.7–1.3)
EOSINOPHIL NFR BLD: 0.1 X10^3/UL (ref 0–0.7)
EOSINOPHIL NFR BLD: 1 % (ref 0–3)
ERYTHROCYTE [DISTWIDTH] IN BLOOD BY AUTOMATED COUNT: 16 % (ref 11.5–14.5)
GFR SERPLBLD BASED ON 1.73 SQ M-ARVRAT: 70.7 ML/MIN
GLUCOSE SERPL-MCNC: 116 MG/DL (ref 70–99)
HCT VFR BLD CALC: 32.2 % (ref 39–53)
HGB BLD-MCNC: 10.5 G/DL (ref 13–17.5)
LYMPHOCYTES # BLD: 0.7 X10^3/UL (ref 1–4.8)
LYMPHOCYTES NFR BLD AUTO: 13 % (ref 24–48)
MCH RBC QN AUTO: 26 PG (ref 25–35)
MCHC RBC AUTO-ENTMCNC: 33 G/DL (ref 31–37)
MCV RBC AUTO: 80 FL (ref 79–100)
MONO #: 0.5 X10^3/UL (ref 0–1.1)
MONOCYTES NFR BLD: 9 % (ref 0–9)
NEUT #: 4.2 X10^3/UL (ref 1.8–7.7)
NEUTROPHILS NFR BLD AUTO: 76 % (ref 31–73)
PLATELET # BLD AUTO: 217 X10^3/UL (ref 140–400)
POTASSIUM SERPL-SCNC: 3.8 MMOL/L (ref 3.5–5.1)
RBC # BLD AUTO: 4.01 X10^6/UL (ref 4.3–5.7)
SODIUM SERPL-SCNC: 140 MMOL/L (ref 136–145)
WBC # BLD AUTO: 5.5 X10^3/UL (ref 4–11)

## 2020-01-07 PROCEDURE — 0W993ZZ DRAINAGE OF RIGHT PLEURAL CAVITY, PERCUTANEOUS APPROACH: ICD-10-PCS | Performed by: RADIOLOGY

## 2020-01-07 RX ADMIN — ATORVASTATIN CALCIUM SCH MG: 40 TABLET, FILM COATED ORAL at 20:43

## 2020-01-07 RX ADMIN — FOLIC ACID SCH MG: 1 TABLET ORAL at 08:56

## 2020-01-07 RX ADMIN — TAMSULOSIN HYDROCHLORIDE SCH MG: 0.4 CAPSULE ORAL at 20:43

## 2020-01-07 RX ADMIN — TAMSULOSIN HYDROCHLORIDE SCH MG: 0.4 CAPSULE ORAL at 08:55

## 2020-01-07 RX ADMIN — FUROSEMIDE SCH MG: 10 INJECTION, SOLUTION INTRAMUSCULAR; INTRAVENOUS at 13:57

## 2020-01-07 RX ADMIN — FUROSEMIDE SCH MG: 10 INJECTION, SOLUTION INTRAMUSCULAR; INTRAVENOUS at 08:56

## 2020-01-07 RX ADMIN — MAGNESIUM OXIDE TAB 400 MG (241.3 MG ELEMENTAL MG) SCH MG: 400 (241.3 MG) TAB at 08:56

## 2020-01-07 RX ADMIN — CARVEDILOL SCH MG: 12.5 TABLET, FILM COATED ORAL at 15:55

## 2020-01-07 RX ADMIN — PANTOPRAZOLE SODIUM SCH MG: 40 TABLET, DELAYED RELEASE ORAL at 08:55

## 2020-01-07 RX ADMIN — ASPIRIN SCH MG: 81 TABLET, COATED ORAL at 08:55

## 2020-01-07 RX ADMIN — IPRATROPIUM BROMIDE AND ALBUTEROL SULFATE SCH ML: .5; 3 SOLUTION RESPIRATORY (INHALATION) at 07:44

## 2020-01-07 RX ADMIN — LISINOPRIL SCH MG: 5 TABLET ORAL at 08:56

## 2020-01-07 RX ADMIN — POTASSIUM CHLORIDE SCH MEQ: 1500 TABLET, EXTENDED RELEASE ORAL at 08:55

## 2020-01-07 RX ADMIN — Medication SCH MG: at 08:55

## 2020-01-07 RX ADMIN — CARVEDILOL SCH MG: 12.5 TABLET, FILM COATED ORAL at 08:56

## 2020-01-07 NOTE — PDOC
PULMONARY PROGRESS NOTES


Subjective


PT FEELS BETTER AFTER TAP NOT MORE SOA


Vitals





Vital Signs








  Date Time  Temp Pulse Resp B/P (MAP) Pulse Ox O2 Delivery O2 Flow Rate FiO2


 


1/7/20 11:00  61 16 119/64 (82) 99 Room Air  


 


1/7/20 07:00 98.1      98.0 





 98.1       








ROS:  No Nausea, No Chest Pain, No Abdominal Pain, No Increase Cough


General:  Alert


Lungs:  Other (decrease right base)


Cardiovascular:  S1, S2


Abdomen:  Soft, Non-tender


Neuro Exam:  Alert


Extremities:  No Edema


Skin:  Warm


Labs





Laboratory Tests








Test


 1/7/20


04:40 1/7/20


08:00 1/7/20


11:50


 


White Blood Count


 5.5 x10^3/uL


(4.0-11.0) 


 





 


Red Blood Count


 4.01 x10^6/uL


(4.30-5.70) 


 





 


Hemoglobin


 10.5 g/dL


(13.0-17.5) 


 





 


Hematocrit


 32.2 %


(39.0-53.0) 


 





 


Mean Corpuscular Volume 80 fL ()   


 


Mean Corpuscular Hemoglobin 26 pg (25-35)   


 


Mean Corpuscular Hemoglobin


Concent 33 g/dL


(31-37) 


 





 


Red Cell Distribution Width


 16.0 %


(11.5-14.5) 


 





 


Platelet Count


 217 x10^3/uL


(140-400) 


 





 


Neutrophils (%) (Auto) 76 % (31-73)   


 


Lymphocytes (%) (Auto) 13 % (24-48)   


 


Monocytes (%) (Auto) 9 % (0-9)   


 


Eosinophils (%) (Auto) 1 % (0-3)   


 


Basophils (%) (Auto) 1 % (0-3)   


 


Neutrophils # (Auto)


 4.2 x10^3/uL


(1.8-7.7) 


 





 


Lymphocytes # (Auto)


 0.7 x10^3/uL


(1.0-4.8) 


 





 


Monocytes # (Auto)


 0.5 x10^3/uL


(0.0-1.1) 


 





 


Eosinophils # (Auto)


 0.1 x10^3/uL


(0.0-0.7) 


 





 


Basophils # (Auto)


 0.0 x10^3/uL


(0.0-0.2) 


 





 


Sodium Level


 140 mmol/L


(136-145) 


 





 


Potassium Level


 3.8 mmol/L


(3.5-5.1) 


 





 


Chloride Level


 103 mmol/L


() 


 





 


Carbon Dioxide Level


 29 mmol/L


(21-32) 


 





 


Anion Gap 8 (6-14)   


 


Blood Urea Nitrogen


 19 mg/dL


(8-26) 


 





 


Creatinine


 1.2 mg/dL


(0.7-1.3) 


 





 


Estimated GFR


(Cockcroft-Gault) 70.7 


 


 





 


Glucose Level


 116 mg/dL


(70-99) 


 





 


Calcium Level


 6.5 mg/dL


(8.5-10.1) 


 





 


Body Fluid pH  7.64  


 


Ionized Calcium


 


 


 0.86 mmol/L


(1.13-1.32)


 


Magnesium Level


 


 


 1.7 mg/dL


(1.8-2.4)








Laboratory Tests








Test


 1/7/20


04:40 1/7/20


08:00 1/7/20


11:50


 


White Blood Count


 5.5 x10^3/uL


(4.0-11.0) 


 





 


Red Blood Count


 4.01 x10^6/uL


(4.30-5.70) 


 





 


Hemoglobin


 10.5 g/dL


(13.0-17.5) 


 





 


Hematocrit


 32.2 %


(39.0-53.0) 


 





 


Mean Corpuscular Volume 80 fL ()   


 


Mean Corpuscular Hemoglobin 26 pg (25-35)   


 


Mean Corpuscular Hemoglobin


Concent 33 g/dL


(31-37) 


 





 


Red Cell Distribution Width


 16.0 %


(11.5-14.5) 


 





 


Platelet Count


 217 x10^3/uL


(140-400) 


 





 


Neutrophils (%) (Auto) 76 % (31-73)   


 


Lymphocytes (%) (Auto) 13 % (24-48)   


 


Monocytes (%) (Auto) 9 % (0-9)   


 


Eosinophils (%) (Auto) 1 % (0-3)   


 


Basophils (%) (Auto) 1 % (0-3)   


 


Neutrophils # (Auto)


 4.2 x10^3/uL


(1.8-7.7) 


 





 


Lymphocytes # (Auto)


 0.7 x10^3/uL


(1.0-4.8) 


 





 


Monocytes # (Auto)


 0.5 x10^3/uL


(0.0-1.1) 


 





 


Eosinophils # (Auto)


 0.1 x10^3/uL


(0.0-0.7) 


 





 


Basophils # (Auto)


 0.0 x10^3/uL


(0.0-0.2) 


 





 


Sodium Level


 140 mmol/L


(136-145) 


 





 


Potassium Level


 3.8 mmol/L


(3.5-5.1) 


 





 


Chloride Level


 103 mmol/L


() 


 





 


Carbon Dioxide Level


 29 mmol/L


(21-32) 


 





 


Anion Gap 8 (6-14)   


 


Blood Urea Nitrogen


 19 mg/dL


(8-26) 


 





 


Creatinine


 1.2 mg/dL


(0.7-1.3) 


 





 


Estimated GFR


(Cockcroft-Gault) 70.7 


 


 





 


Glucose Level


 116 mg/dL


(70-99) 


 





 


Calcium Level


 6.5 mg/dL


(8.5-10.1) 


 





 


Body Fluid pH  7.64  


 


Ionized Calcium


 


 


 0.86 mmol/L


(1.13-1.32)


 


Magnesium Level


 


 


 1.7 mg/dL


(1.8-2.4)








Medications





Active Scripts








 Medications  Dose


 Route/Sig


 Max Daily Dose Days Date Category


 


 Lisinopril 5 Mg


 Tablet  1 Tab


 PO DAILY


   1/4/20 Reported


 


 Eliquis


  (Apixaban) 2.5 Mg


 Tablet  2.5 Mg


 PO Q12HR


   1/4/20 Reported


 


 Folic Acid 20 Mg


 Capsule  1 Mg


 PO DAILY


   1/4/20 Reported


 


 Vitamin D


  (Cholecalciferol


  (Vitamin D3))


 10,000 Unit


 Capsule  50,000 Unit


 PO WEEKLY


   1/4/20 Reported


 


 Furosemide 40 Mg


 Tablet  40 Mg


 PO BID


   1/4/20 Reported


 


 Omeprazole 40 Mg


 Capsule.dr  1 Cap


 PO DAILY


   1/4/20 Reported


 


 Flomax


  (Tamsulosin Hcl)


 0.4 Mg Cap.er.24h  1 Cap


 PO BID


   1/4/20 Reported


 


 Atorvastatin


 Calcium 40 Mg


 Tablet  1 Tab


 PO QHS


   1/4/20 Reported


 


 Carvedilol **


  (Carvedilol)


 3.125 Mg Tablet  3.125 Mg


 PO BIDWMEALS


   1/4/20 Reported


 


 Vitamin B-1


  (Thiamine


 Mononitrate) 100


 Mg Tablet  1 Tab


 PO DAILY


  30 1/4/20 Reported


 


 Magnesium


  (Magnesium Oxide)


 400 Mg Capsule  1 Cap


 PO DAILY


  30 1/4/20 Reported











Impression


.





IMPRESSION:


1.  Right-sided effusion. S/P THORACENTESIS 


2.  Progressive dyspnea secondary to above.resolved


3.  Chronic obstructive pulmonary disease.


4.  Coronary artery disease.


5.  Bradycardia status post pacemaker.


6.  Acute respiratory failure secondary to above.





Plan


.


FOLLOW ANAYSIS


1.  s/p thoracentesis.  Clinically, I do not think


that this is related to parapneumonic effusion.


2.  Rule out possibility of a malignant effusion.


3.  echocardiogram, with EF 15%











NATANAEL LOZANO MD                  Jan 7, 2020 13:00

## 2020-01-07 NOTE — RAD
EXAM: Chest, single view.

 

HISTORY: Thoracentesis.

 

COMPARISON: 1/4/2020

 

FINDINGS: A frontal view of the chest is obtained. There has been interval

decrease in a moderate right pleural effusion. There is a stable small 

left pleural effusion. There is stable diffuse interstitial infiltrate 

with suspected superimposed bilateral lower lobe atelectasis or partial 

consolidation. There is stable cardia megaly. There is a cardiac pacemaker

unchanged in position. There is no pneumothorax.

 

IMPRESSION: 

1. No pneumothorax status post right thoracentesis. There has been 

interval decrease in a small right pleural effusion.

2. Stable small left pleural effusion and diffuse interstitial infiltrate 

with bilateral lower lobe atelectasis or partial consolidated infiltrate.

 

Electronically signed by: Katie Dodd MD (1/7/2020 4:18 PM) [place] [id number]

## 2020-01-07 NOTE — PDOC
CARDIO Progress Notes


Date and Time


Date of Service


1/7/20


Time of Evaluation


1110





Subjective


Subjective:  No Chest Pain, No shortness of breath, No Palpitations, Other 

(breathing much better s/p thoracentesis )





Vitals


Vitals





Vital Signs








  Date Time  Temp Pulse Resp B/P (MAP) Pulse Ox O2 Delivery O2 Flow Rate FiO2


 


1/7/20 15:55  75  122/78    


 


1/7/20 15:00   16  99 Room Air  


 


1/7/20 07:00 98.1      98.0 





 98.1       








Weight


Weight [ ]





Input and Output


Intake and Output











Intake and Output 


 


 1/7/20





 07:00


 


Intake Total 920 ml


 


Output Total 2400 ml


 


Balance -1480 ml


 


 


 


Intake Oral 920 ml


 


Output Urine Total 2400 ml


 


# Bowel Movements 1











Laboratory


Labs





Laboratory Tests








Test


 1/7/20


04:40 1/7/20


08:00 1/7/20


11:50


 


White Blood Count


 5.5 x10^3/uL


(4.0-11.0) 


 





 


Red Blood Count


 4.01 x10^6/uL


(4.30-5.70) 


 





 


Hemoglobin


 10.5 g/dL


(13.0-17.5) 


 





 


Hematocrit


 32.2 %


(39.0-53.0) 


 





 


Mean Corpuscular Volume 80 fL ()   


 


Mean Corpuscular Hemoglobin 26 pg (25-35)   


 


Mean Corpuscular Hemoglobin


Concent 33 g/dL


(31-37) 


 





 


Red Cell Distribution Width


 16.0 %


(11.5-14.5) 


 





 


Platelet Count


 217 x10^3/uL


(140-400) 


 





 


Neutrophils (%) (Auto) 76 % (31-73)   


 


Lymphocytes (%) (Auto) 13 % (24-48)   


 


Monocytes (%) (Auto) 9 % (0-9)   


 


Eosinophils (%) (Auto) 1 % (0-3)   


 


Basophils (%) (Auto) 1 % (0-3)   


 


Neutrophils # (Auto)


 4.2 x10^3/uL


(1.8-7.7) 


 





 


Lymphocytes # (Auto)


 0.7 x10^3/uL


(1.0-4.8) 


 





 


Monocytes # (Auto)


 0.5 x10^3/uL


(0.0-1.1) 


 





 


Eosinophils # (Auto)


 0.1 x10^3/uL


(0.0-0.7) 


 





 


Basophils # (Auto)


 0.0 x10^3/uL


(0.0-0.2) 


 





 


Sodium Level


 140 mmol/L


(136-145) 


 





 


Potassium Level


 3.8 mmol/L


(3.5-5.1) 


 





 


Chloride Level


 103 mmol/L


() 


 





 


Carbon Dioxide Level


 29 mmol/L


(21-32) 


 





 


Anion Gap 8 (6-14)   


 


Blood Urea Nitrogen


 19 mg/dL


(8-26) 


 





 


Creatinine


 1.2 mg/dL


(0.7-1.3) 


 





 


Estimated GFR


(Cockcroft-Gault) 70.7 


 


 





 


Glucose Level


 116 mg/dL


(70-99) 


 





 


Calcium Level


 6.5 mg/dL


(8.5-10.1) 


 





 


Body Fluid pH  7.64  


 


Ionized Calcium


 


 


 0.86 mmol/L


(1.13-1.32)


 


Magnesium Level


 


 


 1.7 mg/dL


(1.8-2.4)











Physical Exam


HEENT:  Neck Supple W Full Motion


Chest:  Symmetric


LUNGS:  Other (diminished )


Heart:  S1S2, RRR, murmurs (2/6 systolic murmur )


Abdomen:  Soft N/T


Extremities:  Other (trace LE edema bilaterally)


Neurology:  alert, oriented, follow commands





Assessment


Assessment


1.  Dyspnea with a/c CHF and pleural effusion.  s/p thoracentesiss this am with 

1.2 L removed . 


2.  Acute on chronic systolic CHF. better compensated following diuresis 


3.  Mixed ischemic/ non-ischemic cardiomyopathy; Echo showed LVEF 15% . Previous

echo 6/2019 with LVEF 30%. Cath 6/2019 with moderate, diffuse CAD.. Follows with

MAC


4.  CAD s/p PCI/stent to the RCA 2009; CP free. 


5.  SSS s/p PPM ([employer]). Device check 6/19 with normal function.


6.  Accelerated hypertension:  Better controlled


7.  PAFIB ; predominantly SR, rate controlled. 


8.  Hyperlipidemia:  Continue statin therapy


9.  Arrhythmia; episode of NSVT this am on tele





Recommendations





Lasix therapy; resume oral


Resume Eliquis for stroke prophylaxis 


Check Mg- replace as warranted 


Continue secondary prevention measures. 


Consider Entresto 


Consider upgrade to AICD for primary prevention of SCD; will defer to primary 

cardiologist











Records from :








Cardiac Catheterization 


DATE: 06/25/2019





ANATOMY: LEFT MAIN: Left main arose from left coronary cusp. It had 10% to 15% 

narrowing distally. It was heavily calcified. It bifurcated into the left 

anterior descending and circumflex. 


LEFT ANTERIOR DESCENDING: The left anterior descending is heavily calcified, has

60% to perhaps 70% stenosis in the proximal portion. The LAD continues on 

supplied diagonal which is small diffusely disease and then a mid vessel has a 

60% stenosis. This is a type 3 LAD. There is minimal disease distally. 


CIRCUMFLEX: The circumflex has mild irregularities supplying a 1st obtuse 

marginal which is unremarkable, and a 2nd obtuse marginal which is unremarkable.




RIGHT CORONARY ARTERY: The right coronary is dominant arising from right 

coronary cusp. It has heavy calcification with 30% stenosis in the midportion 

and a 40% to 50% just before the posterior descending and posterolateral 

arteries. The posterior descending and posterolateral arteries have mild diffuse

irregularities. 





CONCLUSIONS: 


Diffuse moderate coronary artery disease. In comparison to previous angiogram, 

there is minimal change, although proximal left anterior descending may be 

slightly more severe.


Profound hypotensive response to vasodilators and sedation. This did respond 

slowly to epinephrine and Caleb-Synephrine.








6/24/19 - 2-D + DOPPLER ECHOCARDIOGRAM





* Severely reduced left ventricular systolic function, estimated ejection 

  fraction is 30%. 


* The LV is mildly dilated, severe global hypokinesis. 


* Grade II (moderate) left ventricular diastolic dysfunction. Elevated left 

  atrial pressure. 


* The right ventricle is normal size and function. M-Mode TAPSE 1.66 cm (normal 

  >1.7 cm). 


* Left Atrium: Moderately dilated. 


* No significant valvular abnormalities. 


* Small circumferential pericardial effusion. Left and right pleural effusion. 


* Estimated Peak Systolic PA Pressure 31 mmHg











CHARANJIT ALVAREZ            Jan 7, 2020 17:05

## 2020-01-07 NOTE — RAD
Ultrasound-guided right-sided thoracentesis 1/7/2020 2:34 PM



Indication:  Right Pleural Effusion

  

Procedure: Informed consent was obtained. A timeout procedure was performed.

Sonographic evaluation of the right chest was performed demonstrating moderate

pleural effusion. The  right posterior chest was prepped and draped in sterile

fashion. 1% lidocaine without epinephrine was administered for local

anesthesia. Real-time ultrasonographic guidance was used in passing a 5 [place]

[employer] catheter into the  right pleural space. 1.2 L of serosanguineous pleural

fluid was removed. Samples of fluid were sent to the lab for further

evaluation per ordering physician request.   The catheter was removed and

pressure held to achieve hemostasis. A sterile dressing was applied. No

immediate complications were identified. The patient tolerated the procedure

well. 





Impression:   Right sided ultrasound-guided thoracentesis

## 2020-01-07 NOTE — PDOC
PROGRESS NOTES


History of Present Illness


History of Present Illness





VTE Prophylaxis Ordered


VTE Prophylaxis Devices:  No


VTE Pharmacological Prophylaxi:  Yes





Assessment/Plan


Assessment/Plan


 


IMPRESSION: 








1. Moderate right and small left pleural effusions. Mild pulmonary edema.


Left ventricle systolic function is severely impaired.


Akinetic base to mid inferior wall.


The Ejection Fraction is 15%.


2. Acute hypoxic resp failure


3. COPD


4. HYPERTENSIVE URGENCY


5, PACEMAKER PLACEMENT, REMOTE


6. hypocalcemia 











PLAN





ADMIT


CONSULT CARDIOLOGY


CONSULT PULM


DVT PROPHYLAXIS


IV DIURESIS, LASIX 40MG IV BID


IV HYDRALAZINE PRN BP SUPPORT


ECHO


TSH


THORACENTESIS 1/6


Consider Entresto 


Consider upgrade to AICD for primary prevention of SCD 


ionized ca 











1/6 STILL SOA


1/7 consider AICD  not ready for d/c , may need home health when improved





Vitals


Vitals





Vital Signs








  Date Time  Temp Pulse Resp B/P (MAP) Pulse Ox O2 Delivery O2 Flow Rate FiO2


 


1/7/20 08:56  68  157/75    


 


1/7/20 08:27   16  94 Room Air  


 


1/7/20 07:00 98.1      98.0 





 98.1       











Physical Exam


General:  Alert, Oriented X3, Cooperative, No acute distress


Heart:  Regular rate, Other (heart rate is irregular)


Lungs:  Clear


Abdomen:  Normal bowel sounds, Soft, No tenderness


Extremities:  No cyanosis, Other (trace edema)





Labs


LABS





Tricuspid Valve


TR P. Velocity   341cm/s   RAP ESTIMATE   3mmHg


TR Peak Gr.   47mmHg   RVSP      50mmHg





Pulmonary Vein


S1 Velocity   43.6cm/s   D2 Velocity   31.7cm/s





 LEFT VENTRICLE 


The left ventricle is normal size. There is mild concentric left ventricular 

hypertrophy. Left ventricle systolic function is severely impaired. Akinetic 

base to mid inferior wall. The Ejection Fraction is 15%. Apical motion 

consistent with pacemaker activation. There is severe global hypokinesis of the 

left ventricle. Transmitral Doppler flow pattern is Grade I-abnormal relaxation 

pattern.





 RIGHT VENTRICLE 


The right ventricle is normal size. The right ventricular systolic function is 

normal. There is a pacemaker lead in the right ventricle.





 ATRIA 


The left atrium is mildly dilated. A pacemaker is seen in the right atrium 

consistent with history. The right atrium is mildly dilated. The interatrial 

septum is intact with no evidence for an atrial septal defect or patent foramen 

ovale as noted on 2-D or Doppler imaging.





 AORTIC VALVE 


The aortic valve is mildly thickened but opens well. Doppler and Color Flow 

revealed trace aortic regurgitation. There is no significant aortic valvular 

stenosis.





 MITRAL VALVE 


The mitral valve is thickened but opens well. There is no evidence of mitral 

valve prolapse. There is no mitral valve stenosis. Doppler and Color-flow 

revealed trace mitral regurgitation.





 TRICUSPID VALVE 


The tricuspid valve is normal in structure and function. Doppler and Color Flow 

revealed mild tricuspid regurgitation. There is moderate pulmonary hypertension.

The PA pressure was estimated at 50 mmHg. There is no tricuspid valve stenosis.





 PULMONIC VALVE 


The pulmonic valve is not well visualized. Doppler and Color Flow revealed no 

pulmonic valvular regurgitation. There is no pulmonic valvular stenosis.





 GREAT VESSELS 


The aortic root is normal in size. The ascending aorta is normal in size. The 

IVC is normal in size and collapses >50% with inspiration.





 PERICARDIAL EFFUSION 


There is no evidence of significant pericardial effusion.





Critical Notification


Critical Value: No





<Conclusion>


Left ventricle systolic function is severely impaired.


Akinetic base to mid inferior wall.


The Ejection Fraction is 15%.


Transmitral Doppler flow pattern is Grade I-abnormal relaxation pattern.


There is a pacemaker lead in the right atrium and right ventricle.


Trace mitral regurgitation.


Mild tricuspid regurgitation.


The PA pressure was estimated at 50 mmHg.


There is no evidence of significant pericardial effusion.





Signed by : Ximena Castro, 


Electronically Approved : 01/06/2020 14:16:16














DICTATED and SIGNED BY:     XIMENA CASTRO MD


DATE:     01/06/20 1240





Laboratory Tests








Test


 1/7/20


04:40 1/7/20


08:00


 


White Blood Count


 5.5 x10^3/uL


(4.0-11.0) 





 


Red Blood Count


 4.01 x10^6/uL


(4.30-5.70) 





 


Hemoglobin


 10.5 g/dL


(13.0-17.5) 





 


Hematocrit


 32.2 %


(39.0-53.0) 





 


Mean Corpuscular Volume 80 fL ()  


 


Mean Corpuscular Hemoglobin 26 pg (25-35)  


 


Mean Corpuscular Hemoglobin


Concent 33 g/dL


(31-37) 





 


Red Cell Distribution Width


 16.0 %


(11.5-14.5) 





 


Platelet Count


 217 x10^3/uL


(140-400) 





 


Neutrophils (%) (Auto) 76 % (31-73)  


 


Lymphocytes (%) (Auto) 13 % (24-48)  


 


Monocytes (%) (Auto) 9 % (0-9)  


 


Eosinophils (%) (Auto) 1 % (0-3)  


 


Basophils (%) (Auto) 1 % (0-3)  


 


Neutrophils # (Auto)


 4.2 x10^3/uL


(1.8-7.7) 





 


Lymphocytes # (Auto)


 0.7 x10^3/uL


(1.0-4.8) 





 


Monocytes # (Auto)


 0.5 x10^3/uL


(0.0-1.1) 





 


Eosinophils # (Auto)


 0.1 x10^3/uL


(0.0-0.7) 





 


Basophils # (Auto)


 0.0 x10^3/uL


(0.0-0.2) 





 


Sodium Level


 140 mmol/L


(136-145) 





 


Potassium Level


 3.8 mmol/L


(3.5-5.1) 





 


Chloride Level


 103 mmol/L


() 





 


Carbon Dioxide Level


 29 mmol/L


(21-32) 





 


Anion Gap 8 (6-14)  


 


Blood Urea Nitrogen


 19 mg/dL


(8-26) 





 


Creatinine


 1.2 mg/dL


(0.7-1.3) 





 


Estimated GFR


(Cockcroft-Gault) 70.7 


 





 


Glucose Level


 116 mg/dL


(70-99) 





 


Calcium Level


 6.5 mg/dL


(8.5-10.1) 





 


Body Fluid pH  7.64 











Assessment and Plan


Assessmemt and Plan


Problems


Medical Problems:


(1) CHF exacerbation


Status: Acute  





(2) Hypokalemia


Status: Acute  





(3) Hypoxia


Status: Acute  











Comment


Review of Relevant


I have reviewed the following items dao (where applicable) has been applied.


Labs





Laboratory Tests








Test


 1/7/20


04:40 1/7/20


08:00


 


White Blood Count


 5.5 x10^3/uL


(4.0-11.0) 





 


Red Blood Count


 4.01 x10^6/uL


(4.30-5.70) 





 


Hemoglobin


 10.5 g/dL


(13.0-17.5) 





 


Hematocrit


 32.2 %


(39.0-53.0) 





 


Mean Corpuscular Volume 80 fL ()  


 


Mean Corpuscular Hemoglobin 26 pg (25-35)  


 


Mean Corpuscular Hemoglobin


Concent 33 g/dL


(31-37) 





 


Red Cell Distribution Width


 16.0 %


(11.5-14.5) 





 


Platelet Count


 217 x10^3/uL


(140-400) 





 


Neutrophils (%) (Auto) 76 % (31-73)  


 


Lymphocytes (%) (Auto) 13 % (24-48)  


 


Monocytes (%) (Auto) 9 % (0-9)  


 


Eosinophils (%) (Auto) 1 % (0-3)  


 


Basophils (%) (Auto) 1 % (0-3)  


 


Neutrophils # (Auto)


 4.2 x10^3/uL


(1.8-7.7) 





 


Lymphocytes # (Auto)


 0.7 x10^3/uL


(1.0-4.8) 





 


Monocytes # (Auto)


 0.5 x10^3/uL


(0.0-1.1) 





 


Eosinophils # (Auto)


 0.1 x10^3/uL


(0.0-0.7) 





 


Basophils # (Auto)


 0.0 x10^3/uL


(0.0-0.2) 





 


Sodium Level


 140 mmol/L


(136-145) 





 


Potassium Level


 3.8 mmol/L


(3.5-5.1) 





 


Chloride Level


 103 mmol/L


() 





 


Carbon Dioxide Level


 29 mmol/L


(21-32) 





 


Anion Gap 8 (6-14)  


 


Blood Urea Nitrogen


 19 mg/dL


(8-26) 





 


Creatinine


 1.2 mg/dL


(0.7-1.3) 





 


Estimated GFR


(Cockcroft-Gault) 70.7 


 





 


Glucose Level


 116 mg/dL


(70-99) 





 


Calcium Level


 6.5 mg/dL


(8.5-10.1) 





 


Body Fluid pH  7.64 








Laboratory Tests








Test


 1/7/20


04:40 1/7/20


08:00


 


White Blood Count


 5.5 x10^3/uL


(4.0-11.0) 





 


Red Blood Count


 4.01 x10^6/uL


(4.30-5.70) 





 


Hemoglobin


 10.5 g/dL


(13.0-17.5) 





 


Hematocrit


 32.2 %


(39.0-53.0) 





 


Mean Corpuscular Volume 80 fL ()  


 


Mean Corpuscular Hemoglobin 26 pg (25-35)  


 


Mean Corpuscular Hemoglobin


Concent 33 g/dL


(31-37) 





 


Red Cell Distribution Width


 16.0 %


(11.5-14.5) 





 


Platelet Count


 217 x10^3/uL


(140-400) 





 


Neutrophils (%) (Auto) 76 % (31-73)  


 


Lymphocytes (%) (Auto) 13 % (24-48)  


 


Monocytes (%) (Auto) 9 % (0-9)  


 


Eosinophils (%) (Auto) 1 % (0-3)  


 


Basophils (%) (Auto) 1 % (0-3)  


 


Neutrophils # (Auto)


 4.2 x10^3/uL


(1.8-7.7) 





 


Lymphocytes # (Auto)


 0.7 x10^3/uL


(1.0-4.8) 





 


Monocytes # (Auto)


 0.5 x10^3/uL


(0.0-1.1) 





 


Eosinophils # (Auto)


 0.1 x10^3/uL


(0.0-0.7) 





 


Basophils # (Auto)


 0.0 x10^3/uL


(0.0-0.2) 





 


Sodium Level


 140 mmol/L


(136-145) 





 


Potassium Level


 3.8 mmol/L


(3.5-5.1) 





 


Chloride Level


 103 mmol/L


() 





 


Carbon Dioxide Level


 29 mmol/L


(21-32) 





 


Anion Gap 8 (6-14)  


 


Blood Urea Nitrogen


 19 mg/dL


(8-26) 





 


Creatinine


 1.2 mg/dL


(0.7-1.3) 





 


Estimated GFR


(Cockcroft-Gault) 70.7 


 





 


Glucose Level


 116 mg/dL


(70-99) 





 


Calcium Level


 6.5 mg/dL


(8.5-10.1) 





 


Body Fluid pH  7.64 








Medications





Current Medications


Aspirin (Roz Aspirin) 325 mg 1X  ONCE PO  Last administered on 1/4/20at 11:30;

 Start 1/4/20 at 11:30;  Stop 1/4/20 at 11:31;  Status DC


Nitroglycerin (Nitro-Bid Oint) 0.5 inch 1X  ONCE TP  Last administered on 

1/4/20at 11:30;  Start 1/4/20 at 11:30;  Stop 1/4/20 at 11:31;  Status DC


Albuterol/ Ipratropium (Duoneb) 3 ml 1X  ONCE NEB  Last administered on 1/4/20at

11:29;  Start 1/4/20 at 11:30;  Stop 1/4/20 at 11:31;  Status DC


Furosemide (Lasix) 40 mg 1X  ONCE IVP  Last administered on 1/4/20at 12:48;  

Start 1/4/20 at 12:30;  Stop 1/4/20 at 12:31;  Status DC


Potassium Chloride (Klor-Con) 40 meq 1X  ONCE PO  Last administered on 1/4/20at 

12:48;  Start 1/4/20 at 12:30;  Stop 1/4/20 at 12:31;  Status DC


Ondansetron HCl (Zofran) 4 mg PRN Q8HRS  PRN IV NAUSEA/VOMITING;  Start 1/4/20 

at 12:45;  Stop 1/4/20 at 13:45;  Status DC


Acetaminophen (Tylenol) 650 mg PRN Q4HRS  PRN PO FEVER;  Start 1/4/20 at 12:45; 

Stop 1/4/20 at 13:45;  Status DC


Sodium Chloride (Normal Saline Flush) 3 ml QSHIFT  PRN IV AFTER MEDS AND BLOOD 

DRAWS;  Start 1/4/20 at 13:45


Ondansetron HCl (Zofran) 4 mg PRN Q4HRS  PRN IV NAUSEA/VOMITING;  Start 1/4/20 

at 13:45


Acetaminophen (Tylenol) 650 mg PRN Q4HRS  PRN PO TEMP OVER 100.4F OR MILD PAIN; 

Start 1/4/20 at 13:45


Al Hydroxide/Mg Hydroxide (Mylanta Plus Xs) 30 ml PRN DAILY  PRN PO HEARTBURN / 

GAS;  Start 1/4/20 at 13:45


Clonidine HCl (Catapres) 0.1 mg PRN Q6HRS  PRN PO SBP>160 OR DBP>90;  Start 

1/4/20 at 13:45


Docusate Sodium (Colace) 100 mg PRN BID  PRN PO CONSTIPATION;  Start 1/4/20 at 

13:45


Albuterol/ Ipratropium (Duoneb) 3 ml Q4H NEB  Last administered on 1/6/20at 

19:10;  Start 1/4/20 at 16:00;  Stop 1/6/20 at 22:58;  Status DC


Guaifenesin (Robitussin) 200 mg PRN Q4HRS  PRN PO COUGH;  Start 1/4/20 at 13:45


Lorazepam (Ativan) 0.5 mg PRN Q4HRS  PRN PO ANXIETY / AGITATION;  Start 1/4/20 

at 13:45


Enoxaparin Sodium (Lovenox 40mg Syringe) 40 mg DAILY SQ ;  Start 1/5/20 at 

09:00;  Stop 1/4/20 at 15:50;  Status DC


Furosemide (Lasix) 40 mg BID92 IVP  Last administered on 1/7/20at 08:56;  Start 

1/4/20 at 14:00


Potassium Chloride (Klor-Con) 40 meq 1X  ONCE PO ;  Start 1/4/20 at 14:15;  Stop

1/4/20 at 14:16;  Status DC


Potassium Chloride (Klor-Con) 20 meq DAILY PO  Last administered on 1/7/20at 

08:55;  Start 1/5/20 at 09:00


Hydralazine HCl (Apresoline Inj) 10 mg PRN Q4HRS  PRN IVP ELEVATED BP, SEE 

COMMENTS;  Start 1/4/20 at 14:00


Lisinopril (Prinivil) 5 mg BID PO ;  Start 1/4/20 at 21:00;  Stop 1/4/20 at 

15:50;  Status DC


Apixaban (Eliquis) 2.5 mg Q12HR PO  Last administered on 1/5/20at 20:45;  Start 

1/4/20 at 21:00;  Stop 1/6/20 at 16:16;  Status DC


Atorvastatin Calcium (Lipitor) 40 mg QHS PO  Last administered on 1/6/20at 20:08

;  Start 1/4/20 at 21:00


Carvedilol (Coreg) 3.125 mg BIDWMEALS PO ;  Start 1/5/20 at 21:00;  Stop 1/4/20 

at 19:11;  Status DC


Furosemide (Lasix) 40 mg BID PO ;  Start 1/4/20 at 21:00;  Stop 1/4/20 at 19:16;

 Status DC


Tamsulosin HCl (Flomax) 0.4 mg BID PO  Last administered on 1/7/20at 08:55;  

Start 1/4/20 at 21:00


Thiamine Mononitrate (Vitamin B-1) 100 mg DAILY PO  Last administered on 

1/7/20at 08:55;  Start 1/5/20 at 09:00


Ergocalciferol (Vitamin D2) 50,000 unit WEEKLY PO ;  Start 1/11/20 at 09:00


Folic Acid (Folic Acid) 1 mg DAILY PO  Last administered on 1/7/20at 08:56;  

Start 1/5/20 at 09:00


Magnesium Oxide (Magnesium Oxide) 400 mg DAILY PO  Last administered on 1/7/20at

08:56;  Start 1/5/20 at 09:00


Pantoprazole Sodium (Protonix) 40 mg DAILYAC PO  Last administered on 1/7/20at 

08:55;  Start 1/5/20 at 07:30


Lisinopril (Prinivil) 5 mg DAILY PO ;  Start 1/5/20 at 09:00;  Stop 1/4/20 at 

19:13;  Status DC


Carvedilol (Coreg) 3.125 mg BIDWMEALS PO  Last administered on 1/6/20at 08:29;  

Start 1/4/20 at 21:00;  Stop 1/6/20 at 16:27;  Status DC


Lisinopril (Prinivil) 5 mg DAILY PO  Last administered on 1/7/20at 08:56;  Start

1/4/20 at 21:00


Info (Anti-Coagulation Monitoring By Pharmacy) 1 each PRN DAILY  PRN MC SEE 

COMMENTS;  Start 1/5/20 at 08:15


Aspirin (Ecotrin) 81 mg DAILYWBKFT PO  Last administered on 1/7/20at 08:55;  

Start 1/6/20 at 17:00


Carvedilol (Coreg) 12.5 mg BIDWMEALS PO  Last administered on 1/7/20at 08:56;  

Start 1/6/20 at 17:00


Albuterol/ Ipratropium (Duoneb) 3 ml RTBID NEB ;  Start 1/7/20 at 08:00





Active Scripts


Active


Reported


Lisinopril 5 Mg Tablet 1 Tab PO DAILY


Eliquis (Apixaban) 2.5 Mg Tablet 2.5 Mg PO Q12HR


Folic Acid 20 Mg Capsule 1 Mg PO DAILY


Vitamin D (Cholecalciferol (Vitamin D3)) 10,000 Unit Capsule 50,000 Unit PO 

WEEKLY


Furosemide 40 Mg Tablet 40 Mg PO BID


Omeprazole 40 Mg Capsule.dr 1 Cap PO DAILY


Flomax (Tamsulosin Hcl) 0.4 Mg Cap.er.24h 1 Cap PO BID


Atorvastatin Calcium 40 Mg Tablet 1 Tab PO QHS


Carvedilol ** (Carvedilol) 3.125 Mg Tablet 3.125 Mg PO BIDWMEALS


Vitamin B-1 (Thiamine Mononitrate) 100 Mg Tablet 1 Tab PO DAILY 30 Days


Magnesium (Magnesium Oxide) 400 Mg Capsule 1 Cap PO DAILY 30 Days


Vitals/I & O





Vital Sign - Last 24 Hours








 1/6/20 1/6/20 1/6/20 1/6/20





 15:15 17:45 19:11 19:38


 


Temp 97.5   97.6





 97.5   97.6


 


Pulse 80 80  63


 


Resp 18   22


 


B/P (MAP) 154/75 (101) 154/75  125/69 (87)


 


Pulse Ox 97  94 98


 


O2 Delivery Nasal Cannula  Nasal Cannula Nasal Cannula


 


O2 Flow Rate 2.0  3.0 2.0


 


    





    





 1/6/20 1/6/20 1/7/20 1/7/20





 19:43 22:55 03:16 07:00


 


Temp  97.9 98.0 98.1





  97.9 98.0 98.1


 


Pulse  66 67 67


 


Resp  24 18 18


 


B/P (MAP)  136/72 (93) 164/83 (110) 141/69 (93)


 


Pulse Ox  97  


 


O2 Delivery Nasal Cannula Nasal Cannula Nasal Cannula Nasal Cannula


 


O2 Flow Rate 2.0 2.0 96.0 98.0


 


    





    





 1/7/20 1/7/20 1/7/20 1/7/20





 08:00 08:27 08:56 08:56


 


Pulse  57 72 68


 


Resp  16  


 


B/P (MAP)  173/78 (109) 157/75 157/75


 


Pulse Ox  94  


 


O2 Delivery Room Air Room Air  














Intake and Output   


 


 1/6/20 1/6/20 1/7/20





 15:00 23:00 07:00


 


Intake Total 720 ml 0 ml 200 ml


 


Output Total 300 ml 1000 ml 1100 ml


 


Balance 420 ml -1000 ml -900 ml

















HERMINIO WILKERSON MD           Jan 7, 2020 11:28

## 2020-01-08 VITALS — DIASTOLIC BLOOD PRESSURE: 69 MMHG | SYSTOLIC BLOOD PRESSURE: 129 MMHG

## 2020-01-08 VITALS — DIASTOLIC BLOOD PRESSURE: 80 MMHG | SYSTOLIC BLOOD PRESSURE: 163 MMHG

## 2020-01-08 VITALS — DIASTOLIC BLOOD PRESSURE: 62 MMHG | SYSTOLIC BLOOD PRESSURE: 125 MMHG

## 2020-01-08 VITALS — DIASTOLIC BLOOD PRESSURE: 70 MMHG | SYSTOLIC BLOOD PRESSURE: 142 MMHG

## 2020-01-08 VITALS — SYSTOLIC BLOOD PRESSURE: 137 MMHG | DIASTOLIC BLOOD PRESSURE: 73 MMHG

## 2020-01-08 VITALS — DIASTOLIC BLOOD PRESSURE: 59 MMHG | SYSTOLIC BLOOD PRESSURE: 120 MMHG

## 2020-01-08 RX ADMIN — TAMSULOSIN HYDROCHLORIDE SCH MG: 0.4 CAPSULE ORAL at 09:39

## 2020-01-08 RX ADMIN — CARVEDILOL SCH MG: 12.5 TABLET, FILM COATED ORAL at 09:39

## 2020-01-08 RX ADMIN — POTASSIUM CHLORIDE SCH MEQ: 1500 TABLET, EXTENDED RELEASE ORAL at 09:38

## 2020-01-08 RX ADMIN — IPRATROPIUM BROMIDE AND ALBUTEROL SULFATE SCH ML: .5; 3 SOLUTION RESPIRATORY (INHALATION) at 08:00

## 2020-01-08 RX ADMIN — FUROSEMIDE SCH MG: 40 TABLET ORAL at 13:40

## 2020-01-08 RX ADMIN — LISINOPRIL SCH MG: 5 TABLET ORAL at 09:38

## 2020-01-08 RX ADMIN — ASPIRIN SCH MG: 81 TABLET, COATED ORAL at 09:39

## 2020-01-08 RX ADMIN — IPRATROPIUM BROMIDE AND ALBUTEROL SULFATE SCH ML: .5; 3 SOLUTION RESPIRATORY (INHALATION) at 20:16

## 2020-01-08 RX ADMIN — TAMSULOSIN HYDROCHLORIDE SCH MG: 0.4 CAPSULE ORAL at 20:54

## 2020-01-08 RX ADMIN — MAGNESIUM OXIDE TAB 400 MG (241.3 MG ELEMENTAL MG) SCH MG: 400 (241.3 MG) TAB at 09:37

## 2020-01-08 RX ADMIN — FUROSEMIDE SCH MG: 40 TABLET ORAL at 09:39

## 2020-01-08 RX ADMIN — FOLIC ACID SCH MG: 1 TABLET ORAL at 09:38

## 2020-01-08 RX ADMIN — Medication SCH MG: at 09:38

## 2020-01-08 RX ADMIN — ATORVASTATIN CALCIUM SCH MG: 40 TABLET, FILM COATED ORAL at 20:54

## 2020-01-08 RX ADMIN — PANTOPRAZOLE SODIUM SCH MG: 40 TABLET, DELAYED RELEASE ORAL at 07:02

## 2020-01-08 RX ADMIN — CARVEDILOL SCH MG: 12.5 TABLET, FILM COATED ORAL at 16:18

## 2020-01-08 RX ADMIN — APIXABAN SCH MG: 2.5 TABLET, FILM COATED ORAL at 20:54

## 2020-01-08 RX ADMIN — APIXABAN SCH MG: 2.5 TABLET, FILM COATED ORAL at 09:38

## 2020-01-08 NOTE — PDOC
PULMONARY PROGRESS NOTES


Subjective


PT FEELS BETTER AFTER TAP NOT MORE SOA


Vitals





Vital Signs








  Date Time  Temp Pulse Resp B/P (MAP) Pulse Ox O2 Delivery O2 Flow Rate FiO2


 


1/8/20 10:29 98.2 62  142/70 (94) 95 Room Air  





 98.2       


 


1/8/20 08:09       3.0 


 


1/7/20 23:00   18     








ROS:  No Nausea, No Chest Pain, No Abdominal Pain, No Increase Cough


General:  Alert


Lungs:  Other (decrease right base)


Cardiovascular:  S1, S2


Abdomen:  Soft, Non-tender


Neuro Exam:  Alert


Extremities:  No Edema


Skin:  Warm


Labs





Laboratory Tests








Test


 1/7/20


04:40 1/7/20


08:00 1/7/20


11:50


 


White Blood Count


 5.5 x10^3/uL


(4.0-11.0) 


 





 


Red Blood Count


 4.01 x10^6/uL


(4.30-5.70) 


 





 


Hemoglobin


 10.5 g/dL


(13.0-17.5) 


 





 


Hematocrit


 32.2 %


(39.0-53.0) 


 





 


Mean Corpuscular Volume 80 fL ()   


 


Mean Corpuscular Hemoglobin 26 pg (25-35)   


 


Mean Corpuscular Hemoglobin


Concent 33 g/dL


(31-37) 


 





 


Red Cell Distribution Width


 16.0 %


(11.5-14.5) 


 





 


Platelet Count


 217 x10^3/uL


(140-400) 


 





 


Neutrophils (%) (Auto) 76 % (31-73)   


 


Lymphocytes (%) (Auto) 13 % (24-48)   


 


Monocytes (%) (Auto) 9 % (0-9)   


 


Eosinophils (%) (Auto) 1 % (0-3)   


 


Basophils (%) (Auto) 1 % (0-3)   


 


Neutrophils # (Auto)


 4.2 x10^3/uL


(1.8-7.7) 


 





 


Lymphocytes # (Auto)


 0.7 x10^3/uL


(1.0-4.8) 


 





 


Monocytes # (Auto)


 0.5 x10^3/uL


(0.0-1.1) 


 





 


Eosinophils # (Auto)


 0.1 x10^3/uL


(0.0-0.7) 


 





 


Basophils # (Auto)


 0.0 x10^3/uL


(0.0-0.2) 


 





 


Sodium Level


 140 mmol/L


(136-145) 


 





 


Potassium Level


 3.8 mmol/L


(3.5-5.1) 


 





 


Chloride Level


 103 mmol/L


() 


 





 


Carbon Dioxide Level


 29 mmol/L


(21-32) 


 





 


Anion Gap 8 (6-14)   


 


Blood Urea Nitrogen


 19 mg/dL


(8-26) 


 





 


Creatinine


 1.2 mg/dL


(0.7-1.3) 


 





 


Estimated GFR


(Cockcroft-Gault) 70.7 


 


 





 


Glucose Level


 116 mg/dL


(70-99) 


 





 


Calcium Level


 6.5 mg/dL


(8.5-10.1) 


 





 


Body Fluid pH  7.64  


 


Ionized Calcium


 


 


 0.86 mmol/L


(1.13-1.32)


 


Magnesium Level


 


 


 1.7 mg/dL


(1.8-2.4)








Laboratory Tests








Test


 1/7/20


11:50


 


Ionized Calcium


 0.86 mmol/L


(1.13-1.32)


 


Magnesium Level


 1.7 mg/dL


(1.8-2.4)








Medications





Active Scripts








 Medications  Dose


 Route/Sig


 Max Daily Dose Days Date Category


 


 Lisinopril 5 Mg


 Tablet  1 Tab


 PO DAILY


   1/4/20 Reported


 


 Eliquis


  (Apixaban) 2.5 Mg


 Tablet  2.5 Mg


 PO Q12HR


   1/4/20 Reported


 


 Folic Acid 20 Mg


 Capsule  1 Mg


 PO DAILY


   1/4/20 Reported


 


 Vitamin D


  (Cholecalciferol


  (Vitamin D3))


 10,000 Unit


 Capsule  50,000 Unit


 PO WEEKLY


   1/4/20 Reported


 


 Furosemide 40 Mg


 Tablet  40 Mg


 PO BID


   1/4/20 Reported


 


 Omeprazole 40 Mg


 Capsule.dr  1 Cap


 PO DAILY


   1/4/20 Reported


 


 Flomax


  (Tamsulosin Hcl)


 0.4 Mg Cap.er.24h  1 Cap


 PO BID


   1/4/20 Reported


 


 Atorvastatin


 Calcium 40 Mg


 Tablet  1 Tab


 PO QHS


   1/4/20 Reported


 


 Carvedilol **


  (Carvedilol)


 3.125 Mg Tablet  3.125 Mg


 PO BIDWMEALS


   1/4/20 Reported


 


 Vitamin B-1


  (Thiamine


 Mononitrate) 100


 Mg Tablet  1 Tab


 PO DAILY


  30 1/4/20 Reported


 


 Magnesium


  (Magnesium Oxide)


 400 Mg Capsule  1 Cap


 PO DAILY


  30 1/4/20 Reported











Impression


.





IMPRESSION:


1.  Right-sided effusion. S/P THORACENTESIS 1/7


2.  Progressive dyspnea secondary to above.resolved


3.  Chronic obstructive pulmonary disease.


4.  Coronary artery disease.


5.  Bradycardia status post pacemaker.


6.  Acute respiratory failure secondary to above.


7. EF 15%





Plan


.





1.  s/p thoracentesis. likely transudate, follow all results


2.  cxr better , still mild CHF, RESIDUAL EFFUSION


3.  echocardiogram, with EF 15%


4.  cardiology rec


    OK WITH NATANAEL JOHNS MD                  Jan 8, 2020 11:29

## 2020-01-08 NOTE — PDOC
PROGRESS NOTES


History of Present Illness


History of Present Illness





VTE Prophylaxis Ordered


VTE Prophylaxis Devices:  No


VTE Pharmacological Prophylaxi:  Yes





Assessment/Plan


Assessment/Plan


 


IMPRESSION: 








1. Moderate right and small left pleural effusions. Mild pulmonary edema.


Left ventricle systolic function is severely impaired.


Akinetic base to mid inferior wall.


The Ejection Fraction is 15%.


2. Acute hypoxic resp failure


3. COPD


4. HYPERTENSIVE URGENCY


5, PACEMAKER PLACEMENT, REMOTE


6. hypocalcemia 











PLAN





ADMIT


CONSULT CARDIOLOGY


CONSULT PULM


DVT PROPHYLAXIS


IV DIURESIS, LASIX 40MG IV BID


IV HYDRALAZINE PRN BP SUPPORT


ECHO


TSH


THORACENTESIS 1/6


Consider Entresto 


Consider upgrade to AICD for primary prevention of SCD 


Consider upgrade to AICD for primary prevention of SCD; will defer to primary 

cardiologist


Has appointment scheduled with Dr. Santos next week.


ionized ca LOW, REPLACED











1/6 STILL SOA


1/7 consider AICD  not ready for d/c , may need home health when improved


1/8 HOME IN AM POST ELECTROLYTE   CORRECTION Consider upgrade to AICD for 

primary prevention of SCD; will defer to primary cardiologist Has appointment 

scheduled with Dr. Santos next week.





Vitals


Vitals





Vital Signs








  Date Time  Temp Pulse Resp B/P (MAP) Pulse Ox O2 Delivery O2 Flow Rate FiO2


 


1/8/20 09:39  66  137/73    


 


1/8/20 08:09     96 Nasal Cannula 3.0 


 


1/8/20 07:18 97.5       





 97.5       


 


1/7/20 23:00   18     











Physical Exam


General:  Alert, Oriented X3, Cooperative, No acute distress


Heart:  Regular rate, Normal S1, Other (heart rate is irregular)


Lungs:  Other (decrease right base)


Abdomen:  Normal bowel sounds, Soft, No tenderness


Extremities:  No cyanosis, Other (trace edema)





Labs


LABS





Laboratory Tests








Test


 1/7/20


11:50


 


Ionized Calcium


 0.86 mmol/L


(1.13-1.32)


 


Magnesium Level


 1.7 mg/dL


(1.8-2.4)











Assessment and Plan


Assessmemt and Plan


Problems


Medical Problems:


(1) CHF exacerbation


Status: Acute  





(2) Hypokalemia


Status: Acute  





(3) Hypoxia


Status: Acute  











Comment


Review of Relevant


I have reviewed the following items dao (where applicable) has been applied.


Labs





Laboratory Tests








Test


 1/7/20


04:40 1/7/20


08:00 1/7/20


11:50


 


White Blood Count


 5.5 x10^3/uL


(4.0-11.0) 


 





 


Red Blood Count


 4.01 x10^6/uL


(4.30-5.70) 


 





 


Hemoglobin


 10.5 g/dL


(13.0-17.5) 


 





 


Hematocrit


 32.2 %


(39.0-53.0) 


 





 


Mean Corpuscular Volume 80 fL ()   


 


Mean Corpuscular Hemoglobin 26 pg (25-35)   


 


Mean Corpuscular Hemoglobin


Concent 33 g/dL


(31-37) 


 





 


Red Cell Distribution Width


 16.0 %


(11.5-14.5) 


 





 


Platelet Count


 217 x10^3/uL


(140-400) 


 





 


Neutrophils (%) (Auto) 76 % (31-73)   


 


Lymphocytes (%) (Auto) 13 % (24-48)   


 


Monocytes (%) (Auto) 9 % (0-9)   


 


Eosinophils (%) (Auto) 1 % (0-3)   


 


Basophils (%) (Auto) 1 % (0-3)   


 


Neutrophils # (Auto)


 4.2 x10^3/uL


(1.8-7.7) 


 





 


Lymphocytes # (Auto)


 0.7 x10^3/uL


(1.0-4.8) 


 





 


Monocytes # (Auto)


 0.5 x10^3/uL


(0.0-1.1) 


 





 


Eosinophils # (Auto)


 0.1 x10^3/uL


(0.0-0.7) 


 





 


Basophils # (Auto)


 0.0 x10^3/uL


(0.0-0.2) 


 





 


Sodium Level


 140 mmol/L


(136-145) 


 





 


Potassium Level


 3.8 mmol/L


(3.5-5.1) 


 





 


Chloride Level


 103 mmol/L


() 


 





 


Carbon Dioxide Level


 29 mmol/L


(21-32) 


 





 


Anion Gap 8 (6-14)   


 


Blood Urea Nitrogen


 19 mg/dL


(8-26) 


 





 


Creatinine


 1.2 mg/dL


(0.7-1.3) 


 





 


Estimated GFR


(Cockcroft-Gault) 70.7 


 


 





 


Glucose Level


 116 mg/dL


(70-99) 


 





 


Calcium Level


 6.5 mg/dL


(8.5-10.1) 


 





 


Body Fluid pH  7.64  


 


Ionized Calcium


 


 


 0.86 mmol/L


(1.13-1.32)


 


Magnesium Level


 


 


 1.7 mg/dL


(1.8-2.4)








Laboratory Tests








Test


 1/7/20


11:50


 


Ionized Calcium


 0.86 mmol/L


(1.13-1.32)


 


Magnesium Level


 1.7 mg/dL


(1.8-2.4)








Medications





Current Medications


Aspirin (Roz Aspirin) 325 mg 1X  ONCE PO  Last administered on 1/4/20at 11:30;

 Start 1/4/20 at 11:30;  Stop 1/4/20 at 11:31;  Status DC


Nitroglycerin (Nitro-Bid Oint) 0.5 inch 1X  ONCE TP  Last administered on 

1/4/20at 11:30;  Start 1/4/20 at 11:30;  Stop 1/4/20 at 11:31;  Status DC


Albuterol/ Ipratropium (Duoneb) 3 ml 1X  ONCE NEB  Last administered on 1/4/20at

11:29;  Start 1/4/20 at 11:30;  Stop 1/4/20 at 11:31;  Status DC


Furosemide (Lasix) 40 mg 1X  ONCE IVP  Last administered on 1/4/20at 12:48;  

Start 1/4/20 at 12:30;  Stop 1/4/20 at 12:31;  Status DC


Potassium Chloride (Klor-Con) 40 meq 1X  ONCE PO  Last administered on 1/4/20at 

12:48;  Start 1/4/20 at 12:30;  Stop 1/4/20 at 12:31;  Status DC


Ondansetron HCl (Zofran) 4 mg PRN Q8HRS  PRN IV NAUSEA/VOMITING;  Start 1/4/20 

at 12:45;  Stop 1/4/20 at 13:45;  Status DC


Acetaminophen (Tylenol) 650 mg PRN Q4HRS  PRN PO FEVER;  Start 1/4/20 at 12:45; 

Stop 1/4/20 at 13:45;  Status DC


Sodium Chloride (Normal Saline Flush) 3 ml QSHIFT  PRN IV AFTER MEDS AND BLOOD 

DRAWS;  Start 1/4/20 at 13:45


Ondansetron HCl (Zofran) 4 mg PRN Q4HRS  PRN IV NAUSEA/VOMITING;  Start 1/4/20 

at 13:45


Acetaminophen (Tylenol) 650 mg PRN Q4HRS  PRN PO TEMP OVER 100.4F OR MILD PAIN; 

Start 1/4/20 at 13:45


Al Hydroxide/Mg Hydroxide (Mylanta Plus Xs) 30 ml PRN DAILY  PRN PO HEARTBURN / 

GAS;  Start 1/4/20 at 13:45


Clonidine HCl (Catapres) 0.1 mg PRN Q6HRS  PRN PO SBP>160 OR DBP>90;  Start 

1/4/20 at 13:45


Docusate Sodium (Colace) 100 mg PRN BID  PRN PO CONSTIPATION;  Start 1/4/20 at 

13:45


Albuterol/ Ipratropium (Duoneb) 3 ml Q4H NEB  Last administered on 1/6/20at 

19:10;  Start 1/4/20 at 16:00;  Stop 1/6/20 at 22:58;  Status DC


Guaifenesin (Robitussin) 200 mg PRN Q4HRS  PRN PO COUGH;  Start 1/4/20 at 13:45


Lorazepam (Ativan) 0.5 mg PRN Q4HRS  PRN PO ANXIETY / AGITATION;  Start 1/4/20 

at 13:45


Enoxaparin Sodium (Lovenox 40mg Syringe) 40 mg DAILY SQ ;  Start 1/5/20 at 

09:00;  Stop 1/4/20 at 15:50;  Status DC


Furosemide (Lasix) 40 mg BID92 IVP  Last administered on 1/7/20at 13:57;  Start 

1/4/20 at 14:00;  Stop 1/7/20 at 17:08;  Status DC


Potassium Chloride (Klor-Con) 40 meq 1X  ONCE PO ;  Start 1/4/20 at 14:15;  Stop

1/4/20 at 14:16;  Status DC


Potassium Chloride (Klor-Con) 20 meq DAILY PO  Last administered on 1/8/20at 

09:38;  Start 1/5/20 at 09:00


Hydralazine HCl (Apresoline Inj) 10 mg PRN Q4HRS  PRN IVP ELEVATED BP, SEE 

COMMENTS;  Start 1/4/20 at 14:00


Lisinopril (Prinivil) 5 mg BID PO ;  Start 1/4/20 at 21:00;  Stop 1/4/20 at 

15:50;  Status DC


Apixaban (Eliquis) 2.5 mg Q12HR PO  Last administered on 1/5/20at 20:45;  Start 

1/4/20 at 21:00;  Stop 1/6/20 at 16:16;  Status DC


Atorvastatin Calcium (Lipitor) 40 mg QHS PO  Last administered on 1/7/20at 

20:43;  Start 1/4/20 at 21:00


Carvedilol (Coreg) 3.125 mg BIDWMEALS PO ;  Start 1/5/20 at 21:00;  Stop 1/4/20 

at 19:11;  Status DC


Furosemide (Lasix) 40 mg BID PO ;  Start 1/4/20 at 21:00;  Stop 1/4/20 at 19:16;

 Status DC


Tamsulosin HCl (Flomax) 0.4 mg BID PO  Last administered on 1/8/20at 09:39;  

Start 1/4/20 at 21:00


Thiamine Mononitrate (Vitamin B-1) 100 mg DAILY PO  Last administered on 

1/8/20at 09:38;  Start 1/5/20 at 09:00


Ergocalciferol (Vitamin D2) 50,000 unit WEEKLY PO ;  Start 1/11/20 at 09:00


Folic Acid (Folic Acid) 1 mg DAILY PO  Last administered on 1/8/20at 09:38;  

Start 1/5/20 at 09:00


Magnesium Oxide (Magnesium Oxide) 400 mg DAILY PO  Last administered on 1/8/20at

09:37;  Start 1/5/20 at 09:00


Pantoprazole Sodium (Protonix) 40 mg DAILYAC PO  Last administered on 1/8/20at 

07:02;  Start 1/5/20 at 07:30


Lisinopril (Prinivil) 5 mg DAILY PO ;  Start 1/5/20 at 09:00;  Stop 1/4/20 at 

19:13;  Status DC


Carvedilol (Coreg) 3.125 mg BIDWMEALS PO  Last administered on 1/6/20at 08:29;  

Start 1/4/20 at 21:00;  Stop 1/6/20 at 16:27;  Status DC


Lisinopril (Prinivil) 5 mg DAILY PO  Last administered on 1/8/20at 09:38;  Start

1/4/20 at 21:00


Info (Anti-Coagulation Monitoring By Pharmacy) 1 each PRN DAILY  PRN MC SEE 

COMMENTS;  Start 1/5/20 at 08:15


Aspirin (Ecotrin) 81 mg DAILYWBKFT PO  Last administered on 1/8/20at 09:39;  

Start 1/6/20 at 17:00


Carvedilol (Coreg) 12.5 mg BIDWMEALS PO  Last administered on 1/8/20at 09:39;  

Start 1/6/20 at 17:00


Albuterol/ Ipratropium (Duoneb) 3 ml RTBID NEB  Last administered on 1/8/20at 

08:00;  Start 1/7/20 at 08:00


Magnesium Sulfate 50 ml @ 25 mls/hr 1X  ONCE IV  Last administered on 1/7/20at 

13:57;  Start 1/7/20 at 13:15;  Stop 1/7/20 at 15:14;  Status DC


Apixaban (Eliquis) 2.5 mg BID PO  Last administered on 1/8/20at 09:38;  Start 

1/8/20 at 09:00


Furosemide (Lasix) 40 mg BID92 PO  Last administered on 1/8/20at 09:39;  Start 

1/8/20 at 09:00





Active Scripts


Active


Reported


Lisinopril 5 Mg Tablet 1 Tab PO DAILY


Eliquis (Apixaban) 2.5 Mg Tablet 2.5 Mg PO Q12HR


Folic Acid 20 Mg Capsule 1 Mg PO DAILY


Vitamin D (Cholecalciferol (Vitamin D3)) 10,000 Unit Capsule 50,000 Unit PO 

WEEKLY


Furosemide 40 Mg Tablet 40 Mg PO BID


Omeprazole 40 Mg Capsule.dr 1 Cap PO DAILY


Flomax (Tamsulosin Hcl) 0.4 Mg Cap.er.24h 1 Cap PO BID


Atorvastatin Calcium 40 Mg Tablet 1 Tab PO QHS


Carvedilol ** (Carvedilol) 3.125 Mg Tablet 3.125 Mg PO BIDWMEALS


Vitamin B-1 (Thiamine Mononitrate) 100 Mg Tablet 1 Tab PO DAILY 30 Days


Magnesium (Magnesium Oxide) 400 Mg Capsule 1 Cap PO DAILY 30 Days


Vitals/I & O





Vital Sign - Last 24 Hours








 1/7/20 1/7/20 1/7/20 1/7/20





 10:12 10:34 11:00 12:04


 


Pulse 68 69 61 64


 


Resp 16 16 16 16


 


B/P (MAP) 135/72 (93) 136/87 (103) 119/64 (82) 123/66 (85)


 


Pulse Ox 95 95 99 97


 


O2 Delivery Room Air Room Air Room Air Room Air





 1/7/20 1/7/20 1/7/20 1/7/20





 13:04 15:00 15:55 19:00


 


Temp    99.3





    99.3


 


Pulse 65 61 75 65


 


Resp 16 16  18


 


B/P (MAP) 130/75 (93) 116/64 (81) 122/78 110/72 (85)


 


Pulse Ox 97 99  97


 


O2 Delivery Room Air Room Air  Room Air


 


    





    





 1/7/20 1/7/20 1/7/20 1/8/20





 20:00 20:56 23:00 00:45


 


Temp   98.0 





   98.0 


 


Pulse   59 


 


Resp   18 


 


B/P (MAP)   128/60 (82) 


 


Pulse Ox  96 91 95


 


O2 Delivery Nasal Cannula Nasal Cannula Room Air Room Air


 


O2 Flow Rate 2.0 3.0  


 


    





    





 1/8/20 1/8/20 1/8/20 1/8/20





 03:00 07:18 08:09 09:38


 


Temp 98.5 97.5  





 98.5 97.5  


 


Pulse 65 60  66


 


B/P (MAP) 129/69 (89) 137/73 (94)  137/73


 


Pulse Ox 96 99 96 


 


O2 Delivery Room Air Room Air Nasal Cannula 


 


O2 Flow Rate   3.0 


 


    





    





 1/8/20   





 09:39   


 


Pulse 66   


 


B/P (MAP) 137/73   














Intake and Output   


 


 1/7/20 1/7/20 1/8/20





 15:00 23:00 07:00


 


Intake Total  170 ml 120 ml


 


Output Total 1201 ml 425 ml 300 ml


 


Balance -1201 ml -255 ml -180 ml

















HERMINIO WILKERSON MD           Jan 8, 2020 10:09

## 2020-01-08 NOTE — PDOC
CHARANJIT ALVAREZ APRN 1/8/20 1518:


CARDIO Progress Notes


Date and Time


Date of Service


1/8/20


Time of Evaluation


1210





Subjective


Subjective:  No Chest Pain, No shortness of breath, No Palpitations, Other 

(dressed, ready to go home )





Vitals


Vitals





Vital Signs








  Date Time  Temp Pulse Resp B/P (MAP) Pulse Ox O2 Delivery O2 Flow Rate FiO2


 


1/8/20 14:32 98.0 61  163/80 (107) 100 Room Air  





 98.0       


 


1/8/20 08:09       3.0 


 


1/7/20 23:00   18     








Weight


Weight [ ]





Input and Output


Intake and Output











Intake and Output 


 


 1/8/20





 06:59


 


Intake Total 290 ml


 


Output Total 1926 ml


 


Balance -1636 ml


 


 


 


Intake Oral 240 ml


 


IV Total 50 ml


 


Output Urine Total 726 ml


 


Other 1200 ml











Physical Exam


HEENT:  Neck Supple W Full Motion


Chest:  Symmetric


LUNGS:  Other (diminished )


Heart:  S1S2, RRR, murmurs (2/6 systolic murmur )


Abdomen:  Soft N/T


Extremities:  Other (trace LE edema bilaterally)


Neurology:  alert, oriented, follow commands





Assessment


Assessment


1.  Dyspnea with a/c CHF and pleural effusion.  s/p thoracentesiss this am with 

1.2 L removed . 


2.  Acute on chronic systolic CHF. better compensated following diuresis 


3.  Mixed ischemic/ non-ischemic cardiomyopathy; Echo showed LVEF 15% . Previous

echo 6/2019 with LVEF 30%. Cath 6/2019 with moderate, diffuse CAD. 


4.  CAD s/p PCI/stent to the RCA 2009; CP free. 


5.  SSS s/p PPM ([employer]). Device check 6/19 with normal function.


6.  Accelerated hypertension:  Better controlled


7.  PAFIB ; predominantly SR, rate controlled. 


8.  Hyperlipidemia:  Continue statin therapy


9.  Arrhythmia; episode of NSVT this am on tele





Recommendations





Lasix therapy; resume oral


Continue Eliquis for stroke prophylaxis 


Continue secondary prevention measures. 


Consider Entresto on an outpatient basis 


Consider upgrade to AICD for primary prevention of SCD; will defer to primary 

cardiologist


Has appointment scheduled with Dr. Santos next week.











Records from :








Cardiac Catheterization 


DATE: 06/25/2019





ANATOMY: LEFT MAIN: Left main arose from left coronary cusp. It had 10% to 15% 

narrowing distally. It was heavily calcified. It bifurcated into the left 

anterior descending and circumflex. 


LEFT ANTERIOR DESCENDING: The left anterior descending is heavily calcified, has

60% to perhaps 70% stenosis in the proximal portion. The LAD continues on 

supplied diagonal which is small diffusely disease and then a mid vessel has a 

60% stenosis. This is a type 3 LAD. There is minimal disease distally. 


CIRCUMFLEX: The circumflex has mild irregularities supplying a 1st obtuse 

marginal which is unremarkable, and a 2nd obtuse marginal which is unremarkable.




RIGHT CORONARY ARTERY: The right coronary is dominant arising from right 

coronary cusp. It has heavy calcification with 30% stenosis in the midportion 

and a 40% to 50% just before the posterior descending and posterolateral 

arteries. The posterior descending and posterolateral arteries have mild diffuse

irregularities. 





CONCLUSIONS: 


Diffuse moderate coronary artery disease. In comparison to previous angiogram, 

there is minimal change, although proximal left anterior descending may be 

slightly more severe.


Profound hypotensive response to vasodilators and sedation. This did respond 

slowly to epinephrine and Caleb-Synephrine.








6/24/19 - 2-D + DOPPLER ECHOCARDIOGRAM





* Severely reduced left ventricular systolic function, estimated ejection 

  fraction is 30%. 


* The LV is mildly dilated, severe global hypokinesis. 


* Grade II (moderate) left ventricular diastolic dysfunction. Elevated left 

  atrial pressure. 


* The right ventricle is normal size and function. M-Mode TAPSE 1.66 cm (normal 

  >1.7 cm). 


* Left Atrium: Moderately dilated. 


* No significant valvular abnormalities. 


* Small circumferential pericardial effusion. Left and right pleural effusion. 


* Estimated Peak Systolic PA Pressure 31 mmHg





XIMENA RECIO MD 1/8/20 1713:


CARDIO Progress Notes


Assessment


Assessment


Patient seen and examined. Agree with NP's assessment and plan.


Acute on chronic systolic heart failure better compensated


CAD status clinically stable


Defer the issue of initiating entresto and PM upgrade to ICD to primary cardi

ologist











CHARANJIT ALVAREZ            Jan 8, 2020 15:18


XIMENA RECIO MD            Jan 8, 2020 17:13

## 2020-01-08 NOTE — PDOC
PULMONARY PROGRESS NOTES


Subjective


PT FEELS BETTER AFTER TAP NOT MORE SOA


Vitals





Vital Signs








  Date Time  Temp Pulse Resp B/P (MAP) Pulse Ox O2 Delivery O2 Flow Rate FiO2


 


1/8/20 08:09     96 Nasal Cannula 3.0 


 


1/8/20 07:18 97.5 60  137/73 (94)    





 97.5       


 


1/7/20 23:00   18     








ROS:  No Nausea, No Chest Pain, No Abdominal Pain, No Increase Cough


General:  Alert


Lungs:  Other (decrease right base)


Cardiovascular:  S1, S2


Abdomen:  Soft, Non-tender


Neuro Exam:  Alert


Extremities:  No Edema


Skin:  Warm


Labs





Laboratory Tests








Test


 1/7/20


04:40 1/7/20


08:00 1/7/20


11:50


 


White Blood Count


 5.5 x10^3/uL


(4.0-11.0) 


 





 


Red Blood Count


 4.01 x10^6/uL


(4.30-5.70) 


 





 


Hemoglobin


 10.5 g/dL


(13.0-17.5) 


 





 


Hematocrit


 32.2 %


(39.0-53.0) 


 





 


Mean Corpuscular Volume 80 fL ()   


 


Mean Corpuscular Hemoglobin 26 pg (25-35)   


 


Mean Corpuscular Hemoglobin


Concent 33 g/dL


(31-37) 


 





 


Red Cell Distribution Width


 16.0 %


(11.5-14.5) 


 





 


Platelet Count


 217 x10^3/uL


(140-400) 


 





 


Neutrophils (%) (Auto) 76 % (31-73)   


 


Lymphocytes (%) (Auto) 13 % (24-48)   


 


Monocytes (%) (Auto) 9 % (0-9)   


 


Eosinophils (%) (Auto) 1 % (0-3)   


 


Basophils (%) (Auto) 1 % (0-3)   


 


Neutrophils # (Auto)


 4.2 x10^3/uL


(1.8-7.7) 


 





 


Lymphocytes # (Auto)


 0.7 x10^3/uL


(1.0-4.8) 


 





 


Monocytes # (Auto)


 0.5 x10^3/uL


(0.0-1.1) 


 





 


Eosinophils # (Auto)


 0.1 x10^3/uL


(0.0-0.7) 


 





 


Basophils # (Auto)


 0.0 x10^3/uL


(0.0-0.2) 


 





 


Sodium Level


 140 mmol/L


(136-145) 


 





 


Potassium Level


 3.8 mmol/L


(3.5-5.1) 


 





 


Chloride Level


 103 mmol/L


() 


 





 


Carbon Dioxide Level


 29 mmol/L


(21-32) 


 





 


Anion Gap 8 (6-14)   


 


Blood Urea Nitrogen


 19 mg/dL


(8-26) 


 





 


Creatinine


 1.2 mg/dL


(0.7-1.3) 


 





 


Estimated GFR


(Cockcroft-Gault) 70.7 


 


 





 


Glucose Level


 116 mg/dL


(70-99) 


 





 


Calcium Level


 6.5 mg/dL


(8.5-10.1) 


 





 


Body Fluid pH  7.64  


 


Ionized Calcium


 


 


 0.86 mmol/L


(1.13-1.32)


 


Magnesium Level


 


 


 1.7 mg/dL


(1.8-2.4)








Laboratory Tests








Test


 1/7/20


11:50


 


Ionized Calcium


 0.86 mmol/L


(1.13-1.32)


 


Magnesium Level


 1.7 mg/dL


(1.8-2.4)








Medications





Active Scripts








 Medications  Dose


 Route/Sig


 Max Daily Dose Days Date Category


 


 Lisinopril 5 Mg


 Tablet  1 Tab


 PO DAILY


   1/4/20 Reported


 


 Eliquis


  (Apixaban) 2.5 Mg


 Tablet  2.5 Mg


 PO Q12HR


   1/4/20 Reported


 


 Folic Acid 20 Mg


 Capsule  1 Mg


 PO DAILY


   1/4/20 Reported


 


 Vitamin D


  (Cholecalciferol


  (Vitamin D3))


 10,000 Unit


 Capsule  50,000 Unit


 PO WEEKLY


   1/4/20 Reported


 


 Furosemide 40 Mg


 Tablet  40 Mg


 PO BID


   1/4/20 Reported


 


 Omeprazole 40 Mg


 Capsule.dr  1 Cap


 PO DAILY


   1/4/20 Reported


 


 Flomax


  (Tamsulosin Hcl)


 0.4 Mg Cap.er.24h  1 Cap


 PO BID


   1/4/20 Reported


 


 Atorvastatin


 Calcium 40 Mg


 Tablet  1 Tab


 PO QHS


   1/4/20 Reported


 


 Carvedilol **


  (Carvedilol)


 3.125 Mg Tablet  3.125 Mg


 PO BIDWMEALS


   1/4/20 Reported


 


 Vitamin B-1


  (Thiamine


 Mononitrate) 100


 Mg Tablet  1 Tab


 PO DAILY


  30 1/4/20 Reported


 


 Magnesium


  (Magnesium Oxide)


 400 Mg Capsule  1 Cap


 PO DAILY


  30 1/4/20 Reported











Impression


.





IMPRESSION:


1.  Right-sided effusion. S/P THORACENTESIS 


2.  Progressive dyspnea secondary to above.resolved


3.  Chronic obstructive pulmonary disease.


4.  Coronary artery disease.


5.  Bradycardia status post pacemaker.


6.  Acute respiratory failure secondary to above.





Plan


.


FOLLOW ANAYSIS


1.  s/p thoracentesis. likely transudate, follow all results


2.  cxr better , still mild CHF


3.  echocardiogram, with EF 15%


4.  cardiology rec











NATANAEL LOZANO MD                  Jan 8, 2020 09:09

## 2020-01-08 NOTE — PATHOLOGY
Note

[facility] Accession Number: [id number]

   TESTS               RESULT  FLAG  UNITS    REF RANGE  LAB

------------------------------------------------------------

   Clinician Provided Cytology Information

   No. of containers..01 Other (Miscellaneous)

Source:                                                   01

   RIGHT PLEURAL FLUID

DIAGNOSIS:                                                02

   RIGHT PLEURAL FLUID

   NEGATIVE FOR MALIGNANT CELLS.

   REACTIVE MESOTHELIAL CELLS PRESENT WITHIN A BACKGROUND OF CHRONIC

   INFLAMMATORY CELLS.

   THIS INTERPRETATION INCLUDES EVALUATION OF A CELL BLOCK.

Signed out by:                                            02

   Jack House MD, Pathologist

   NPI- [id number]

Performed by:                                             01

   Malinda Vaughn, Cytotechnologist ([facility])

Gross description:                                        01

   30ML, CLEAR YELLOW,

   /LCS  01/07/2020 1921 Local



------------------------------------------------------------

    FLAG LEGEND:

    L-Low Normal,H-High Normal,LL-Alert Low,HH-Alert High

    <-Panic Low,>-Panic High,A-Abnormal,AA-Critical Abnormal

------------------------------------------------------------



Performed at:

01 [facility]

   [address]

   [place]  [address]

   Loyd Keller MD, Phone: [phone]

02 [facility]

   [address] [place]  [address]

   Jack House MD, Phone: [phone]

Specimen Comment: A courtesy copy of this report has been sent to [phone], [phone]

Specimen Comment: [id number]

Specimen Comment: Report sent to  / DR WILKERSON

Specimen Comment: A duplicate report has been generated due to demographic updates.

***Performed at:  01

   [facility]

   [address], [place], [place]  [id number]

   MD Loyd Keller MD Phone:  [phone]

## 2020-01-09 VITALS — SYSTOLIC BLOOD PRESSURE: 128 MMHG | DIASTOLIC BLOOD PRESSURE: 64 MMHG

## 2020-01-09 VITALS — SYSTOLIC BLOOD PRESSURE: 127 MMHG | DIASTOLIC BLOOD PRESSURE: 61 MMHG

## 2020-01-09 RX ADMIN — TAMSULOSIN HYDROCHLORIDE SCH MG: 0.4 CAPSULE ORAL at 08:42

## 2020-01-09 RX ADMIN — CARVEDILOL SCH MG: 12.5 TABLET, FILM COATED ORAL at 08:41

## 2020-01-09 RX ADMIN — POTASSIUM CHLORIDE SCH MEQ: 1500 TABLET, EXTENDED RELEASE ORAL at 08:41

## 2020-01-09 RX ADMIN — MAGNESIUM OXIDE TAB 400 MG (241.3 MG ELEMENTAL MG) SCH MG: 400 (241.3 MG) TAB at 08:41

## 2020-01-09 RX ADMIN — APIXABAN SCH MG: 2.5 TABLET, FILM COATED ORAL at 08:42

## 2020-01-09 RX ADMIN — FOLIC ACID SCH MG: 1 TABLET ORAL at 08:41

## 2020-01-09 RX ADMIN — LISINOPRIL SCH MG: 5 TABLET ORAL at 08:42

## 2020-01-09 RX ADMIN — PANTOPRAZOLE SODIUM SCH MG: 40 TABLET, DELAYED RELEASE ORAL at 08:40

## 2020-01-09 RX ADMIN — Medication SCH MG: at 08:42

## 2020-01-09 RX ADMIN — ASPIRIN SCH MG: 81 TABLET, COATED ORAL at 08:41

## 2020-01-09 RX ADMIN — IPRATROPIUM BROMIDE AND ALBUTEROL SULFATE SCH ML: .5; 3 SOLUTION RESPIRATORY (INHALATION) at 07:24

## 2020-01-09 RX ADMIN — FUROSEMIDE SCH MG: 40 TABLET ORAL at 08:42

## 2020-01-09 NOTE — PDOC
PROGRESS NOTES


History of Present Illness


History of Present Illness





VTE Prophylaxis Ordered


VTE Prophylaxis Devices:  No


VTE Pharmacological Prophylaxi:  Yes





DISCHARGE DX =================





Assessment/Plan


 


IMPRESSION: 








1. Moderate right and small left pleural effusions. Mild pulmonary edema.


Left ventricle systolic function is severely impaired.


Akinetic base to mid inferior wall.


The Ejection Fraction is 15%.


2. Acute hypoxic resp failure


3. COPD


4. HYPERTENSIVE URGENCY


5, PACEMAKER PLACEMENT, REMOTE


6. hypocalcemia 











PLAN





ADMIT


CONSULT CARDIOLOGY


CONSULT PULM


DVT PROPHYLAXIS


IV DIURESIS, LASIX 40MG IV BID


IV HYDRALAZINE PRN BP SUPPORT


ECHO


TSH


THORACENTESIS 1/6


Consider Entresto 


Consider upgrade to AICD for primary prevention of SCD 


Consider upgrade to AICD for primary prevention of SCD; will defer to primary 

cardiologist


Has appointment scheduled with Dr. Santos next week.


ionized ca LOW, REPLACED











1/6 STILL SOA


1/7 consider AICD  not ready for d/c , may need home health when improved


1/8 HOME IN AM POST ELECTROLYTE   CORRECTION Consider upgrade to AICD for 

primary prevention of SCD; will defer to primary cardiologist Has appointment 

scheduled with Dr. Santos next week.


1/9 d/c to home with home health








D/C PLANNING 33 MIN





Vitals


Vitals





Vital Signs








  Date Time  Temp Pulse Resp B/P (MAP) Pulse Ox O2 Delivery O2 Flow Rate FiO2


 


1/9/20 08:42  60  128/64    


 


1/9/20 07:24     95 Room Air  


 


1/9/20 07:00 98.2  20     





 98.2       


 


1/8/20 08:09       3.0 











Physical Exam


General:  Alert, Oriented X3, Cooperative, No acute distress


Heart:  Regular rate, Normal S1, Other (heart rate is irregular)


Lungs:  Other (decrease right base)


Abdomen:  Normal bowel sounds, Soft, No tenderness


Extremities:  No cyanosis, Other (trace edema)





Assessment and Plan


Assessmemt and Plan


Problems


Medical Problems:


(1) CHF exacerbation


Status: Acute  





(2) Hypokalemia


Status: Acute  





(3) Hypoxia


Status: Acute  











Comment


Review of Relevant


I have reviewed the following items dao (where applicable) has been applied.


Labs





Laboratory Tests








Test


 1/7/20


11:50


 


Ionized Calcium


 0.86 mmol/L


(1.13-1.32)


 


Magnesium Level


 1.7 mg/dL


(1.8-2.4)








Medications





Current Medications


Aspirin (Roz Aspirin) 325 mg 1X  ONCE PO  Last administered on 1/4/20at 11:30;

 Start 1/4/20 at 11:30;  Stop 1/4/20 at 11:31;  Status DC


Nitroglycerin (Nitro-Bid Oint) 0.5 inch 1X  ONCE TP  Last administered on 

1/4/20at 11:30;  Start 1/4/20 at 11:30;  Stop 1/4/20 at 11:31;  Status DC


Albuterol/ Ipratropium (Duoneb) 3 ml 1X  ONCE NEB  Last administered on 1/4/20at

11:29;  Start 1/4/20 at 11:30;  Stop 1/4/20 at 11:31;  Status DC


Furosemide (Lasix) 40 mg 1X  ONCE IVP  Last administered on 1/4/20at 12:48;  

Start 1/4/20 at 12:30;  Stop 1/4/20 at 12:31;  Status DC


Potassium Chloride (Klor-Con) 40 meq 1X  ONCE PO  Last administered on 1/4/20at 

12:48;  Start 1/4/20 at 12:30;  Stop 1/4/20 at 12:31;  Status DC


Ondansetron HCl (Zofran) 4 mg PRN Q8HRS  PRN IV NAUSEA/VOMITING;  Start 1/4/20 

at 12:45;  Stop 1/4/20 at 13:45;  Status DC


Acetaminophen (Tylenol) 650 mg PRN Q4HRS  PRN PO FEVER;  Start 1/4/20 at 12:45; 

Stop 1/4/20 at 13:45;  Status DC


Sodium Chloride (Normal Saline Flush) 3 ml QSHIFT  PRN IV AFTER MEDS AND BLOOD 

DRAWS;  Start 1/4/20 at 13:45


Ondansetron HCl (Zofran) 4 mg PRN Q4HRS  PRN IV NAUSEA/VOMITING;  Start 1/4/20 

at 13:45


Acetaminophen (Tylenol) 650 mg PRN Q4HRS  PRN PO TEMP OVER 100.4F OR MILD PAIN; 

Start 1/4/20 at 13:45


Al Hydroxide/Mg Hydroxide (Mylanta Plus Xs) 30 ml PRN DAILY  PRN PO HEARTBURN / 

GAS;  Start 1/4/20 at 13:45


Clonidine HCl (Catapres) 0.1 mg PRN Q6HRS  PRN PO SBP>160 OR DBP>90;  Start 

1/4/20 at 13:45


Docusate Sodium (Colace) 100 mg PRN BID  PRN PO CONSTIPATION;  Start 1/4/20 at 

13:45


Albuterol/ Ipratropium (Duoneb) 3 ml Q4H NEB  Last administered on 1/6/20at 

19:10;  Start 1/4/20 at 16:00;  Stop 1/6/20 at 22:58;  Status DC


Guaifenesin (Robitussin) 200 mg PRN Q4HRS  PRN PO COUGH;  Start 1/4/20 at 13:45


Lorazepam (Ativan) 0.5 mg PRN Q4HRS  PRN PO ANXIETY / AGITATION;  Start 1/4/20 

at 13:45


Enoxaparin Sodium (Lovenox 40mg Syringe) 40 mg DAILY SQ ;  Start 1/5/20 at 

09:00;  Stop 1/4/20 at 15:50;  Status DC


Furosemide (Lasix) 40 mg BID92 IVP  Last administered on 1/7/20at 13:57;  Start 

1/4/20 at 14:00;  Stop 1/7/20 at 17:08;  Status DC


Potassium Chloride (Klor-Con) 40 meq 1X  ONCE PO ;  Start 1/4/20 at 14:15;  Stop

1/4/20 at 14:16;  Status DC


Potassium Chloride (Klor-Con) 20 meq DAILY PO  Last administered on 1/9/20at 

08:41;  Start 1/5/20 at 09:00


Hydralazine HCl (Apresoline Inj) 10 mg PRN Q4HRS  PRN IVP ELEVATED BP, SEE 

COMMENTS;  Start 1/4/20 at 14:00


Lisinopril (Prinivil) 5 mg BID PO ;  Start 1/4/20 at 21:00;  Stop 1/4/20 at 

15:50;  Status DC


Apixaban (Eliquis) 2.5 mg Q12HR PO  Last administered on 1/5/20at 20:45;  Start 

1/4/20 at 21:00;  Stop 1/6/20 at 16:16;  Status DC


Atorvastatin Calcium (Lipitor) 40 mg QHS PO  Last administered on 1/8/20at 

20:54;  Start 1/4/20 at 21:00


Carvedilol (Coreg) 3.125 mg BIDWMEALS PO ;  Start 1/5/20 at 21:00;  Stop 1/4/20 

at 19:11;  Status DC


Furosemide (Lasix) 40 mg BID PO ;  Start 1/4/20 at 21:00;  Stop 1/4/20 at 19:16;

 Status DC


Tamsulosin HCl (Flomax) 0.4 mg BID PO  Last administered on 1/9/20at 08:42;  

Start 1/4/20 at 21:00


Thiamine Mononitrate (Vitamin B-1) 100 mg DAILY PO  Last administered on 

1/9/20at 08:42;  Start 1/5/20 at 09:00


Ergocalciferol (Vitamin D2) 50,000 unit WEEKLY PO ;  Start 1/11/20 at 09:00


Folic Acid (Folic Acid) 1 mg DAILY PO  Last administered on 1/9/20at 08:41;  

Start 1/5/20 at 09:00


Magnesium Oxide (Magnesium Oxide) 400 mg DAILY PO  Last administered on 1/9/20at

08:41;  Start 1/5/20 at 09:00


Pantoprazole Sodium (Protonix) 40 mg DAILYAC PO  Last administered on 1/9/20at 

08:40;  Start 1/5/20 at 07:30


Lisinopril (Prinivil) 5 mg DAILY PO ;  Start 1/5/20 at 09:00;  Stop 1/4/20 at 

19:13;  Status DC


Carvedilol (Coreg) 3.125 mg BIDWMEALS PO  Last administered on 1/6/20at 08:29;  

Start 1/4/20 at 21:00;  Stop 1/6/20 at 16:27;  Status DC


Lisinopril (Prinivil) 5 mg DAILY PO  Last administered on 1/9/20at 08:42;  Start

1/4/20 at 21:00


Info (Anti-Coagulation Monitoring By Pharmacy) 1 each PRN DAILY  PRN MC SEE 

COMMENTS Last administered on 1/8/20at 11:11;  Start 1/5/20 at 08:15


Aspirin (Ecotrin) 81 mg DAILYWBKFT PO  Last administered on 1/9/20at 08:41;  

Start 1/6/20 at 17:00


Carvedilol (Coreg) 12.5 mg BIDWMEALS PO  Last administered on 1/9/20at 08:41;  

Start 1/6/20 at 17:00


Albuterol/ Ipratropium (Duoneb) 3 ml RTBID NEB  Last administered on 1/9/20at 

07:24;  Start 1/7/20 at 08:00


Magnesium Sulfate 50 ml @ 25 mls/hr 1X  ONCE IV  Last administered on 1/7/20at 

13:57;  Start 1/7/20 at 13:15;  Stop 1/7/20 at 15:14;  Status DC


Apixaban (Eliquis) 2.5 mg BID PO  Last administered on 1/9/20at 08:42;  Start 

1/8/20 at 09:00


Furosemide (Lasix) 40 mg BID92 PO  Last administered on 1/9/20at 08:42;  Start 

1/8/20 at 09:00


Calcium Chloride (Calcium Chloride) 1,000 mg 1X  ONCE IV ;  Start 1/8/20 at 

17:30;  Stop 1/8/20 at 17:31;  Status UNV


Magnesium Sulfate 50 ml @ 25 mls/hr 1X  ONCE IV  Last administered on 1/8/20at 

18:06;  Start 1/8/20 at 18:00;  Stop 1/8/20 at 19:59;  Status DC


Calcium/Vitamin D (Oscal D 500mg/ 200uts) 1 tab BIDWMEALS PO  Last administered 

on 1/9/20at 08:42;  Start 1/9/20 at 08:00


Calcium Chloride 1000 mg/Sodium Chloride 110 ml @  220 mls/hr 1X  ONCE IV  Last 

administered on 1/8/20at 18:06;  Start 1/8/20 at 18:00;  Stop 1/8/20 at 18:29;  

Status DC





Active Scripts


Active


Reported


Lisinopril 5 Mg Tablet 1 Tab PO DAILY


Eliquis (Apixaban) 2.5 Mg Tablet 2.5 Mg PO Q12HR


Folic Acid 20 Mg Capsule 1 Mg PO DAILY


Vitamin D (Cholecalciferol (Vitamin D3)) 10,000 Unit Capsule 50,000 Unit PO 

WEEKLY


Furosemide 40 Mg Tablet 40 Mg PO BID


Omeprazole 40 Mg Capsule.dr 1 Cap PO DAILY


Flomax (Tamsulosin Hcl) 0.4 Mg Cap.er.24h 1 Cap PO BID


Atorvastatin Calcium 40 Mg Tablet 1 Tab PO QHS


Carvedilol ** (Carvedilol) 3.125 Mg Tablet 3.125 Mg PO BIDWMEALS


Vitamin B-1 (Thiamine Mononitrate) 100 Mg Tablet 1 Tab PO DAILY 30 Days


Magnesium (Magnesium Oxide) 400 Mg Capsule 1 Cap PO DAILY 30 Days


Vitals/I & O





Vital Sign - Last 24 Hours








 1/8/20 1/8/20 1/8/20 1/8/20





 09:38 09:39 10:29 14:32


 


Temp   98.2 98.0





   98.2 98.0


 


Pulse 66 66 62 61


 


B/P (MAP) 137/73 137/73 142/70 (94) 163/80 (107)


 


Pulse Ox   95 100


 


O2 Delivery   Room Air Room Air


 


    





    





 1/8/20 1/8/20 1/8/20 1/8/20





 16:18 19:38 20:00 20:15


 


Temp  98.3  





  98.3  


 


Pulse 61 63  


 


Resp  18  


 


B/P (MAP) 163/80 120/59 (79)  


 


Pulse Ox  98  96


 


O2 Delivery  Room Air Room Air Room Air


 


    





    





 1/8/20 1/9/20 1/9/20 1/9/20





 22:49 03:27 07:00 07:24


 


Temp 98.2 98.0 98.2 





 98.2 98.0 98.2 


 


Pulse 69 62 60 


 


Resp 20 16 20 


 


B/P (MAP) 125/62 (83) 127/61 (83) 128/64 (85) 


 


Pulse Ox 97 95 95 95


 


O2 Delivery Room Air Room Air Room Air Room Air


 


    





    





 1/9/20 1/9/20  





 08:41 08:42  


 


Pulse 60 60  


 


B/P (MAP) 128/64 128/64  














Intake and Output   


 


 1/8/20 1/8/20 1/9/20





 15:00 23:00 07:00


 


Intake Total   300 ml


 


Output Total  1000 ml 1000 ml


 


Balance  -1000 ml -700 ml











Nutrition Consultation


Dietary Evaluation:


Recommendations by RD:  Dietary education by RD, Decrease Calorie Intake


Comments:  


Continue w/cardaic diet as ordered, honor food preferences, and provide  


snacks as requested





REC offer Ensure supplements if PO intake <75% meals


Expected Outcomes/Goals:  


PO intake to meet >75% est needs





Malnutrition Findings:


Body Fat Depletion (Non Severe:  Mild Depletion


Weight Status:  Appropriate











HERMINIO WILKERSON MD           Jan 9, 2020 09:13

## 2020-01-09 NOTE — PDOC3
Discharge Summary


Date of Admission:  Jan 4, 2020


Date of Discharge:  Jan 9, 2020


Follow-Up:  1-2 days


Admitting Diagnosis comment:





DISCHARGE DX =================





Assessment/Plan


 


IMPRESSION: 








1. Moderate right and small left pleural effusions. Mild pulmonary edema.


Left ventricle systolic function is severely impaired.


Akinetic base to mid inferior wall.


The Ejection Fraction is 15%.


2. Acute hypoxic resp failure


3. COPD


4. HYPERTENSIVE URGENCY


5, PACEMAKER PLACEMENT, REMOTE


6. hypocalcemia 











PLAN





ADMIT


CONSULT CARDIOLOGY


CONSULT PULM


DVT PROPHYLAXIS


IV DIURESIS, LASIX 40MG IV BID


IV HYDRALAZINE PRN BP SUPPORT


ECHO


TSH


THORACENTESIS 1/6


Consider Entresto 


Consider upgrade to AICD for primary prevention of SCD 


Consider upgrade to AICD for primary prevention of SCD; will defer to primary 

cardiologist


Has appointment scheduled with Dr. Santos next week.


ionized ca LOW, REPLACED











1/6 STILL SOA


1/7 consider AICD  not ready for d/c , may need home health when improved


1/8 HOME IN AM POST ELECTROLYTE   CORRECTION Consider upgrade to AICD for 

primary prevention of SCD; will defer to primary cardiologist Has appointment 

scheduled with Dr. Santos next week.


1/9 d/c to home with home health








D/C PLANNING 33 MIN





Vitals


Vitals





Vital Signs








  Date Time  Temp Pulse Resp B/P (MAP) Pulse Ox O2 Delivery O2 Flow Rate FiO2


 


1/9/20 08:42  60  128/64    


 


1/9/20 07:24     95 Room Air  


 


1/9/20 07:00 98.2  20     





 98.2       


 


1/8/20 08:09       3.0 











Physical Exam


General:  Alert, Oriented X3, Cooperative, No acute distress


Heart:  Regular rate, Normal S1, Other (heart rate is irregular)


Lungs:  Other (decrease right base) NO DISTRESS


Abdomen:  Normal bowel sounds, Soft, No tenderness


Extremities:  No cyanosis, Other (trace edema)


FINAL DIAGNOSIS


Problems


Medical Problems:


(1) CHF exacerbation


Status: Acute  





(2) Hypokalemia


Status: Acute  





(3) Hypoxia


Status: Acute  








Brief Hospital Course


Mr. Barber  is a 79 old [sex] who presented with [ ACUTE CHF WITH PLEAURAL 

EFFUSION ]


CONDITION AT DISCHARGE:  Improved


Discharge Medications





Current Medications


Aspirin (Roz Aspirin) 325 mg 1X  ONCE PO  Last administered on 1/4/20at 11:30;

 Start 1/4/20 at 11:30;  Stop 1/4/20 at 11:31;  Status DC


Nitroglycerin (Nitro-Bid Oint) 0.5 inch 1X  ONCE TP  Last administered on 

1/4/20at 11:30;  Start 1/4/20 at 11:30;  Stop 1/4/20 at 11:31;  Status DC


Albuterol/ Ipratropium (Duoneb) 3 ml 1X  ONCE NEB  Last administered on 1/4/20at

11:29;  Start 1/4/20 at 11:30;  Stop 1/4/20 at 11:31;  Status DC


Furosemide (Lasix) 40 mg 1X  ONCE IVP  Last administered on 1/4/20at 12:48;  

Start 1/4/20 at 12:30;  Stop 1/4/20 at 12:31;  Status DC


Potassium Chloride (Klor-Con) 40 meq 1X  ONCE PO  Last administered on 1/4/20at 

12:48;  Start 1/4/20 at 12:30;  Stop 1/4/20 at 12:31;  Status DC


Ondansetron HCl (Zofran) 4 mg PRN Q8HRS  PRN IV NAUSEA/VOMITING;  Start 1/4/20 

at 12:45;  Stop 1/4/20 at 13:45;  Status DC


Acetaminophen (Tylenol) 650 mg PRN Q4HRS  PRN PO FEVER;  Start 1/4/20 at 12:45; 

Stop 1/4/20 at 13:45;  Status DC


Sodium Chloride (Normal Saline Flush) 3 ml QSHIFT  PRN IV AFTER MEDS AND BLOOD 

DRAWS;  Start 1/4/20 at 13:45


Ondansetron HCl (Zofran) 4 mg PRN Q4HRS  PRN IV NAUSEA/VOMITING;  Start 1/4/20 

at 13:45


Acetaminophen (Tylenol) 650 mg PRN Q4HRS  PRN PO TEMP OVER 100.4F OR MILD PAIN; 

Start 1/4/20 at 13:45


Al Hydroxide/Mg Hydroxide (Mylanta Plus Xs) 30 ml PRN DAILY  PRN PO HEARTBURN / 

GAS;  Start 1/4/20 at 13:45


Clonidine HCl (Catapres) 0.1 mg PRN Q6HRS  PRN PO SBP>160 OR DBP>90;  Start 

1/4/20 at 13:45


Docusate Sodium (Colace) 100 mg PRN BID  PRN PO CONSTIPATION;  Start 1/4/20 at 

13:45


Albuterol/ Ipratropium (Duoneb) 3 ml Q4H NEB  Last administered on 1/6/20at 

19:10;  Start 1/4/20 at 16:00;  Stop 1/6/20 at 22:58;  Status DC


Guaifenesin (Robitussin) 200 mg PRN Q4HRS  PRN PO COUGH;  Start 1/4/20 at 13:45


Lorazepam (Ativan) 0.5 mg PRN Q4HRS  PRN PO ANXIETY / AGITATION;  Start 1/4/20 

at 13:45


Enoxaparin Sodium (Lovenox 40mg Syringe) 40 mg DAILY SQ ;  Start 1/5/20 at 

09:00;  Stop 1/4/20 at 15:50;  Status DC


Furosemide (Lasix) 40 mg BID92 IVP  Last administered on 1/7/20at 13:57;  Start 

1/4/20 at 14:00;  Stop 1/7/20 at 17:08;  Status DC


Potassium Chloride (Klor-Con) 40 meq 1X  ONCE PO ;  Start 1/4/20 at 14:15;  Stop

1/4/20 at 14:16;  Status DC


Potassium Chloride (Klor-Con) 20 meq DAILY PO  Last administered on 1/9/20at 

08:41;  Start 1/5/20 at 09:00


Hydralazine HCl (Apresoline Inj) 10 mg PRN Q4HRS  PRN IVP ELEVATED BP, SEE 

COMMENTS;  Start 1/4/20 at 14:00


Lisinopril (Prinivil) 5 mg BID PO ;  Start 1/4/20 at 21:00;  Stop 1/4/20 at 

15:50;  Status DC


Apixaban (Eliquis) 2.5 mg Q12HR PO  Last administered on 1/5/20at 20:45;  Start 

1/4/20 at 21:00;  Stop 1/6/20 at 16:16;  Status DC


Atorvastatin Calcium (Lipitor) 40 mg QHS PO  Last administered on 1/8/20at 

20:54;  Start 1/4/20 at 21:00


Carvedilol (Coreg) 3.125 mg BIDWMEALS PO ;  Start 1/5/20 at 21:00;  Stop 1/4/20 

at 19:11;  Status DC


Furosemide (Lasix) 40 mg BID PO ;  Start 1/4/20 at 21:00;  Stop 1/4/20 at 19:16;

 Status DC


Tamsulosin HCl (Flomax) 0.4 mg BID PO  Last administered on 1/9/20at 08:42;  

Start 1/4/20 at 21:00


Thiamine Mononitrate (Vitamin B-1) 100 mg DAILY PO  Last administered on 1/9/ 20at 08:42;  Start 1/5/20 at 09:00


Ergocalciferol (Vitamin D2) 50,000 unit WEEKLY PO ;  Start 1/11/20 at 09:00


Folic Acid (Folic Acid) 1 mg DAILY PO  Last administered on 1/9/20at 08:41;  

Start 1/5/20 at 09:00


Magnesium Oxide (Magnesium Oxide) 400 mg DAILY PO  Last administered on 1/9/20at

08:41;  Start 1/5/20 at 09:00


Pantoprazole Sodium (Protonix) 40 mg DAILYAC PO  Last administered on 1/9/20at 

08:40;  Start 1/5/20 at 07:30


Lisinopril (Prinivil) 5 mg DAILY PO ;  Start 1/5/20 at 09:00;  Stop 1/4/20 at 

19:13;  Status DC


Carvedilol (Coreg) 3.125 mg BIDWMEALS PO  Last administered on 1/6/20at 08:29;  

Start 1/4/20 at 21:00;  Stop 1/6/20 at 16:27;  Status DC


Lisinopril (Prinivil) 5 mg DAILY PO  Last administered on 1/9/20at 08:42;  Start

1/4/20 at 21:00


Info (Anti-Coagulation Monitoring By Pharmacy) 1 each PRN DAILY  PRN MC SEE 

COMMENTS Last administered on 1/8/20at 11:11;  Start 1/5/20 at 08:15


Aspirin (Ecotrin) 81 mg DAILYWBKFT PO  Last administered on 1/9/20at 08:41;  

Start 1/6/20 at 17:00


Carvedilol (Coreg) 12.5 mg BIDWMEALS PO  Last administered on 1/9/20at 08:41;  

Start 1/6/20 at 17:00


Albuterol/ Ipratropium (Duoneb) 3 ml RTBID NEB  Last administered on 1/9/20at 

07:24;  Start 1/7/20 at 08:00


Magnesium Sulfate 50 ml @ 25 mls/hr 1X  ONCE IV  Last administered on 1/7/20at 

13:57;  Start 1/7/20 at 13:15;  Stop 1/7/20 at 15:14;  Status DC


Apixaban (Eliquis) 2.5 mg BID PO  Last administered on 1/9/20at 08:42;  Start 

1/8/20 at 09:00


Furosemide (Lasix) 40 mg BID92 PO  Last administered on 1/9/20at 08:42;  Start 

1/8/20 at 09:00


Calcium Chloride (Calcium Chloride) 1,000 mg 1X  ONCE IV ;  Start 1/8/20 at 

17:30;  Stop 1/8/20 at 17:31;  Status UNV


Magnesium Sulfate 50 ml @ 25 mls/hr 1X  ONCE IV  Last administered on 1/8/20at 

18:06;  Start 1/8/20 at 18:00;  Stop 1/8/20 at 19:59;  Status DC


Calcium/Vitamin D (Oscal D 500mg/ 200uts) 1 tab BIDWMEALS PO  Last administered 

on 1/9/20at 08:42;  Start 1/9/20 at 08:00


Calcium Chloride 1000 mg/Sodium Chloride 110 ml @  220 mls/hr 1X  ONCE IV  Last 

administered on 1/8/20at 18:06;  Start 1/8/20 at 18:00;  Stop 1/8/20 at 18:29;  

Status DC





Active Scripts


Active


Reported


Lisinopril 5 Mg Tablet 1 Tab PO DAILY


Eliquis (Apixaban) 2.5 Mg Tablet 2.5 Mg PO Q12HR


Folic Acid 20 Mg Capsule 1 Mg PO DAILY


Vitamin D (Cholecalciferol (Vitamin D3)) 10,000 Unit Capsule 50,000 Unit PO 

WEEKLY


Furosemide 40 Mg Tablet 40 Mg PO BID


Omeprazole 40 Mg Capsule.dr 1 Cap PO DAILY


Flomax (Tamsulosin Hcl) 0.4 Mg Cap.er.24h 1 Cap PO BID


Atorvastatin Calcium 40 Mg Tablet 1 Tab PO QHS


Carvedilol ** (Carvedilol) 3.125 Mg Tablet 3.125 Mg PO BIDWMEALS


Vitamin B-1 (Thiamine Mononitrate) 100 Mg Tablet 1 Tab PO DAILY 30 Days


Magnesium (Magnesium Oxide) 400 Mg Capsule 1 Cap PO DAILY 30 Days


Vital Signs





Vital Signs








  Date Time  Temp Pulse Resp B/P (MAP) Pulse Ox O2 Delivery O2 Flow Rate FiO2


 


1/9/20 08:42  60  128/64    


 


1/9/20 07:24     95 Room Air  


 


1/9/20 07:00 98.2  20     





 98.2       


 


1/8/20 08:09       3.0 








Labs





Laboratory Tests








Test


 1/7/20


11:50


 


Ionized Calcium


 0.86 mmol/L


(1.13-1.32)


 


Magnesium Level


 1.7 mg/dL


(1.8-2.4)








Allergies





                                    Allergies








Coded Allergies Type Severity Reaction Last Updated Verified


 


  No Known Drug Allergies    5/25/18 No








Disposition/Orders:  D/C to Home w/ HERMINIO MILLER MD           Jan 9, 2020 10:37

## 2020-01-09 NOTE — PDOC
PULMONARY PROGRESS NOTES


Subjective


PT FEELS BETTER AFTER TAP NOT MORE SOA


Vitals





Vital Signs








  Date Time  Temp Pulse Resp B/P (MAP) Pulse Ox O2 Delivery O2 Flow Rate FiO2


 


1/9/20 08:42  60  128/64    


 


1/9/20 08:00      Room Air  


 


1/9/20 07:24     95   


 


1/9/20 07:00 98.2  20     





 98.2       


 


1/8/20 08:09       3.0 








ROS:  No Nausea, No Chest Pain, No Abdominal Pain, No Increase Cough


General:  Alert


Lungs:  Other (decrease right base)


Cardiovascular:  S1, S2


Abdomen:  Soft, Non-tender


Neuro Exam:  Alert


Extremities:  No Edema


Skin:  Warm


Medications





Active Scripts








 Medications  Dose


 Route/Sig


 Max Daily Dose Days Date Category


 


 Lisinopril 5 Mg


 Tablet  1 Tab


 PO DAILY


   1/4/20 Reported


 


 Eliquis


  (Apixaban) 2.5 Mg


 Tablet  2.5 Mg


 PO Q12HR


   1/4/20 Reported


 


 Folic Acid 20 Mg


 Capsule  1 Mg


 PO DAILY


   1/4/20 Reported


 


 Vitamin D


  (Cholecalciferol


  (Vitamin D3))


 10,000 Unit


 Capsule  50,000 Unit


 PO WEEKLY


   1/4/20 Reported


 


 Furosemide 40 Mg


 Tablet  40 Mg


 PO BID


   1/4/20 Reported


 


 Omeprazole 40 Mg


 Capsule.dr  1 Cap


 PO DAILY


   1/4/20 Reported


 


 Flomax


  (Tamsulosin Hcl)


 0.4 Mg Cap.er.24h  1 Cap


 PO BID


   1/4/20 Reported


 


 Atorvastatin


 Calcium 40 Mg


 Tablet  1 Tab


 PO QHS


   1/4/20 Reported


 


 Carvedilol **


  (Carvedilol)


 3.125 Mg Tablet  3.125 Mg


 PO BIDWMEALS


   1/4/20 Reported


 


 Vitamin B-1


  (Thiamine


 Mononitrate) 100


 Mg Tablet  1 Tab


 PO DAILY


  30 1/4/20 Reported


 


 Magnesium


  (Magnesium Oxide)


 400 Mg Capsule  1 Cap


 PO DAILY


  30 1/4/20 Reported











Impression


.





IMPRESSION:


1.  Right-sided effusion. S/P THORACENTESIS 1/7


2.  Progressive dyspnea secondary to above.resolved


3.  Chronic obstructive pulmonary disease.


4.  Coronary artery disease.


5.  Bradycardia status post pacemaker.


6.  Acute respiratory failure secondary to above.


7. EF 15%





Plan


.





1.  s/p thoracentesis. likely transudate, follow all results


2.  cxr better , still mild CHF, RESIDUAL EFFUSION


3.  echocardiogram, with EF 15%


4.  cardiology rec


    OK WITH NATANAEL JOHNS MD                  Jan 9, 2020 12:06

## 2020-01-09 NOTE — NUR
Discharge Note:



ABDIEL GILBERT [place]



Discharge instructions and discharge home medications reviewed with Patient and a copy 
given. All questions have been answered and understanding verbalized. 



The following instructions and handouts were given discharge instructions and new scripts 
sent to pharmacy on file



Discontinued lines and drains: PIV discontinued without complications



Patient discharged to home with home health via [facility]

## 2020-01-09 NOTE — SNU/HH DC
DISCHARGE WITH HOME HEALTH


DISCHARGE INFORMATION:


Final Diagnosis:


Problems


Medical Problems:


(1) CHF exacerbation


Status: Acute  





(2) Hypokalemia


Status: Acute  





(3) Hypoxia


Status: Acute  








Condition on Discharge:  Stable





CODE STATUS:


Code Status:  Full





HOME HEALTH:


Face to Face:


I certify this patient is under my care and that I, or a nurse practitioner or 

physician's assistant working with me, had a face to face encounter that meets 

the physician face to face encounter requirements with this patient on [].


Medical Complications:  CHF


RN For Eval/Treatment:  Yes


Physical Therapy For:  Evalulation/Treatment


Occupational Therapy For:  Evaluation/Treatment


Speech Language Pathology For:  Evaluation/Treatment


Home Health Aide For:  Self-care


MSW For:  Community Resources


Pt Meets Homebound Status:  Fatigue w/ amb.





POST DISCHARGE ORDERS:


Activity Instructions for Disc:  Activity as tolerated


DIET AFTER DISCHARGE:  Cardiac


Wound/Incision Care:  No wound care needed





CHECKS AFTER DISCHARGE:


Checks after discharge:  Check blood press - daily, Check your Temp as needed, 

Weigh Yourself Daily





FOLLOW-UP:


Follow up with:  Dr Castro @ [phone]


Follow Up With:  Primary care provider in 2 weeks





TREATMENT/EQUIPMENT ORDERS:


Adaptive Equipment Issued:  Front wheeled walker, Raised toilet seat w/arms





CERTIFICATION STATEMENT:


Certification Statement:


Certification Statement: Based on the above finding, I certify that this patient

 is confined to the home and needs intermittent skilled nursing care, physical 

therapy and/or speech therapy, or continues to need occupational therapy.~ This 

patient is under my care, and I have initiated the establishment of the plan of 

care.~ This patient will be followed by myself or a community physician who will

 periodically review the plan of care.


Home Meds


Active Scripts


Docusate Sodium (DOK) 100 Mg Capsule, 100 MG PO PRN BID PRN for CONSTIPATION for

 14 Days, #30 CAP


   Prov:HERMINIO WILKERSON MD         1/9/20


Potassium Chloride (KLOR-CON M20) 20 Meq Tab.er.prt, 20 MEQ PO DAILY for 

SUPPLEMENT for 14 Days, #14 TAB.SR


   Prov:HERMINIO WILKERSON MD         1/9/20


Calcium Carbonate/Vitamin D3 (OYSCO 500+D TABLET) 1 Each Tablet, 1 TAB PO BIDWME

ALS for SUPPLEMENT for 30 Days, #60 TAB


   Prov:HERMINIO WILKERSON MD         1/9/20


Aspirin (ASPIRIN EC) 81 Mg Tablet., 81 MG PO DAILYWBK for HEART HEALTH for 

30 Days, #30 TAB.SR


   Prov:HERMINIO WILKERSON MD         1/9/20


Carvedilol (CARVEDILOL **) 12.5 Mg Tablet, 12.5 MG PO BIDWMEALS for CHF for 30 

Days, #60 TAB


   Prov:HERMINIO WILKERSON MD         1/9/20


Ipratropium/Albuterol Sulfate (DUONEB 0.5-3(2.5) MG/3 ML) 3 Ml Ampul.neb, 3 ML 

NEB RTBID for COPD for 30 Days, #60 EACH


   Prov:HERMINIO WILKERSON MD         1/9/20


Reported Medications


Lisinopril (LISINOPRIL) 5 Mg Tablet, 1 TAB PO DAILY for htn, #30 TAB 5 Refills


   1/4/20


Apixaban (ELIQUIS) 2.5 Mg Tablet, 2.5 MG PO Q12HR for f, TAB


   1/4/20


Folic Acid (FOLIC ACID) 20 Mg Capsule, 1 MG PO DAILY for supp, CAP


   1/4/20


Cholecalciferol (Vitamin D3) (VITAMIN D) 10,000 Unit Capsule, 75598 UNIT PO 

WEEKLY for supp, CAP


   1/4/20


Furosemide (FUROSEMIDE) 40 Mg Tablet, 40 MG PO BID for diuretic, TAB


   1/4/20


Omeprazole (OMEPRAZOLE) 40 Mg Capsule., 1 CAP PO DAILY for gerd, #30 CAP 3 

Refills


   1/4/20


Tamsulosin Hcl (FLOMAX) 0.4 Mg Cap.er.24h, 1 CAP PO BID for bph, #30 CAP 11 

Refills


   1/4/20


Atorvastatin Calcium (ATORVASTATIN CALCIUM) 40 Mg Tablet, 1 TAB PO QHS for  HLD,

 #90 TAB 3 Refills


   1/4/20


Thiamine Mononitrate (VITAMIN B-1) 100 Mg Tablet, 1 TAB PO DAILY for supp for 30

 Days, #30 TAB 0 Refills


   1/4/20


Magnesium Oxide (MAGNESIUM) 400 Mg Capsule, 1 CAP PO DAILY for supp for 30 Days,

 #30 CAP 0 Refills


   1/4/20


Discontinued Reported Medications


Carvedilol (CARVEDILOL **) 3.125 Mg Tablet, 3.125 MG PO BIDWMEALS for CARDIAC, 

TAB


   1/4/20


Lisinopril (LISINOPRIL) 20 Mg Tablet, 1 TAB PO DAILY for htn, #30 TAB 5 Refills


   1/4/20











HERMINIO WILKERSON MD           Jan 9, 2020 10:43

## 2020-05-20 ENCOUNTER — HOSPITAL ENCOUNTER (EMERGENCY)
Dept: HOSPITAL 61 - ER | Age: 80
Discharge: HOME | End: 2020-05-20
Payer: MEDICARE

## 2020-05-20 VITALS — SYSTOLIC BLOOD PRESSURE: 178 MMHG | DIASTOLIC BLOOD PRESSURE: 100 MMHG

## 2020-05-20 VITALS — HEIGHT: 71 IN | WEIGHT: 150.13 LBS | BODY MASS INDEX: 21.02 KG/M2

## 2020-05-20 DIAGNOSIS — Z95.0: ICD-10-CM

## 2020-05-20 DIAGNOSIS — R60.0: ICD-10-CM

## 2020-05-20 DIAGNOSIS — I11.0: ICD-10-CM

## 2020-05-20 DIAGNOSIS — M70.21: Primary | ICD-10-CM

## 2020-05-20 DIAGNOSIS — Z98.890: ICD-10-CM

## 2020-05-20 DIAGNOSIS — F17.200: ICD-10-CM

## 2020-05-20 DIAGNOSIS — I50.9: ICD-10-CM

## 2020-05-20 PROCEDURE — 99281 EMR DPT VST MAYX REQ PHY/QHP: CPT

## 2020-05-20 NOTE — PHYS DOC
Past Medical History


Past Medical History:  CHF, Heart Disease, Hypertension, Other


Additional Past Medical Histor:  BRADYCARDIA


Past Surgical History:  Pacemaker, Other


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


Smoking Status:  Current Every Day Smoker


Alcohol Use:  None


Drug Use:  None





Adult General


Chief Complaint


Chief Complaint:  UPPER EXTREMITY PAIN





HPI


HPI





Patient is a 79  year old R handed  -American male who presents with 

nontraumatic, nonpainful, right posterior elbow swelling.  Symptoms began 2 

weeks ago.  No prior episodes.  Patient's not been evaluated for this complaint 

prior to today's ED visit.  []





Review of Systems


Review of Systems


ROS as per HPI





All other systems were reviewed and found to be within normal limits, except as 

documented in this note.





Allergies


Allergies





Allergies








Coded Allergies Type Severity Reaction Last Updated Verified


 


  No Known Drug Allergies    5/25/18 No











Physical Exam


Physical Exam





Constitutional: Well developed, well nourished, no acute distress, non-toxic 

appearance. []


HENT: Normocephalic, atraumatic, bilateral external ears normal, oropharynx 

moist, no oral exudates, nose normal. []


Eyes: PERRLA, EOMI, conjunctiva normal, no discharge. [] 


Extremities: Right elbow, posterior bursa swelling without erythema warmth or 

tenderness.  [] 


Neurologic: Alert and oriented X 3, normal motor function, normal sensory 

function, no focal deficits noted. []


Psychologic: Affect normal, judgement normal, mood normal. []





Current Patient Data


Vital Signs





                                   Vital Signs








  Date Time  Temp Pulse Resp B/P (MAP) Pulse Ox O2 Delivery O2 Flow Rate FiO2


 


5/20/20 10:14 98.0 69 18 178/100 (126) 98 Room Air  





 98.0       











EKG


EKG


[]





Radiology/Procedures


Radiology/Procedures


[]





Course & Med Decision Making


Course & Med Decision Making


Pertinent Labs and Imaging studies reviewed. (See chart for details)





[Benign olecranon bursitis.]





Dragon Disclaimer


Dragon Disclaimer


This electronic medical record was generated, in whole or in part, using a voice

recognition dictation system.





Departure


Departure


Impression:  


   Primary Impression:  


   Olecranon bursitis, right elbow


Disposition:  01 HOME, SELF-CARE


Condition:  STABLE


Patient Instructions:  Olecranon Bursitis, Easy-to-Read





Additional Instructions:  


Follow up with your PCP as needed.











ANGELA VAUGHN DO                   May 20, 2020 10:58

## 2020-10-22 ENCOUNTER — HOSPITAL ENCOUNTER (INPATIENT)
Dept: HOSPITAL 61 - ER | Age: 80
LOS: 2 days | Discharge: HOME HEALTH | DRG: 291 | End: 2020-10-24
Attending: INTERNAL MEDICINE | Admitting: INTERNAL MEDICINE
Payer: MEDICARE

## 2020-10-22 VITALS — HEIGHT: 71 IN | BODY MASS INDEX: 17.16 KG/M2 | WEIGHT: 122.58 LBS

## 2020-10-22 DIAGNOSIS — Z95.0: ICD-10-CM

## 2020-10-22 DIAGNOSIS — I48.0: ICD-10-CM

## 2020-10-22 DIAGNOSIS — D64.9: ICD-10-CM

## 2020-10-22 DIAGNOSIS — F17.200: ICD-10-CM

## 2020-10-22 DIAGNOSIS — I25.10: ICD-10-CM

## 2020-10-22 DIAGNOSIS — I49.5: ICD-10-CM

## 2020-10-22 DIAGNOSIS — Z98.61: ICD-10-CM

## 2020-10-22 DIAGNOSIS — K21.9: ICD-10-CM

## 2020-10-22 DIAGNOSIS — Z20.828: ICD-10-CM

## 2020-10-22 DIAGNOSIS — I42.8: ICD-10-CM

## 2020-10-22 DIAGNOSIS — I11.0: Primary | ICD-10-CM

## 2020-10-22 DIAGNOSIS — I50.23: ICD-10-CM

## 2020-10-22 DIAGNOSIS — E78.5: ICD-10-CM

## 2020-10-22 DIAGNOSIS — I25.5: ICD-10-CM

## 2020-10-22 DIAGNOSIS — J96.21: ICD-10-CM

## 2020-10-22 DIAGNOSIS — M19.90: ICD-10-CM

## 2020-10-22 DIAGNOSIS — Z82.49: ICD-10-CM

## 2020-10-22 DIAGNOSIS — E11.9: ICD-10-CM

## 2020-10-22 LAB
ALBUMIN SERPL-MCNC: 3.1 G/DL (ref 3.4–5)
ALBUMIN/GLOB SERPL: 0.7 {RATIO} (ref 1–1.7)
ALP SERPL-CCNC: 109 U/L (ref 46–116)
ALT SERPL-CCNC: 22 U/L (ref 16–63)
ANION GAP SERPL CALC-SCNC: 11 MMOL/L (ref 6–14)
ANISOCYTOSIS BLD QL SMEAR: (no result)
APTT PPP: YELLOW S
AST SERPL-CCNC: 31 U/L (ref 15–37)
BACTERIA #/AREA URNS HPF: 0 /HPF
BASE EXCESS ABG: -4 MMOL/L (ref -3–3)
BASOPHILS # BLD AUTO: 0 X10^3/UL (ref 0–0.2)
BASOPHILS NFR BLD: 1 % (ref 0–3)
BILIRUB SERPL-MCNC: 0.3 MG/DL (ref 0.2–1)
BILIRUB UR QL STRIP: NEGATIVE
BUN SERPL-MCNC: 15 MG/DL (ref 8–26)
BUN/CREAT SERPL: 14 (ref 6–20)
CALCIUM SERPL-MCNC: 7.8 MG/DL (ref 8.5–10.1)
CHLORIDE SERPL-SCNC: 100 MMOL/L (ref 98–107)
CO2 SERPL-SCNC: 25 MMOL/L (ref 21–32)
CREAT SERPL-MCNC: 1.1 MG/DL (ref 0.7–1.3)
EOSINOPHIL NFR BLD: 0 % (ref 0–3)
EOSINOPHIL NFR BLD: 0 X10^3/UL (ref 0–0.7)
ERYTHROCYTE [DISTWIDTH] IN BLOOD BY AUTOMATED COUNT: 20.8 % (ref 11.5–14.5)
FIBRINOGEN PPP-MCNC: CLEAR MG/DL
GFR SERPLBLD BASED ON 1.73 SQ M-ARVRAT: 77.9 ML/MIN
GLUCOSE SERPL-MCNC: 116 MG/DL (ref 70–99)
HCO3 BLDA-SCNC: 20 MMOL/L (ref 21–28)
HCT VFR BLD CALC: 34.7 % (ref 39–53)
HGB BLD-MCNC: 11 G/DL (ref 13–17.5)
HYALINE CASTS #/AREA URNS LPF: (no result) /HPF
INSPIRATION SETTING TIME VENT: (no result)
LYMPHOCYTES # BLD: 0.4 X10^3/UL (ref 1–4.8)
LYMPHOCYTES NFR BLD AUTO: 7 % (ref 24–48)
MCH RBC QN AUTO: 25 PG (ref 25–35)
MCHC RBC AUTO-ENTMCNC: 32 G/DL (ref 31–37)
MCV RBC AUTO: 80 FL (ref 79–100)
MONO #: 0.4 X10^3/UL (ref 0–1.1)
MONOCYTES NFR BLD: 7 % (ref 0–9)
NEUT #: 4.8 X10^3/UL (ref 1.8–7.7)
NEUTROPHILS NFR BLD AUTO: 85 % (ref 31–73)
NITRITE UR QL STRIP: NEGATIVE
PCO2 BLDA: 34 MMHG (ref 35–46)
PH UR STRIP: 5.5 [PH]
PLATELET # BLD AUTO: 286 X10^3/UL (ref 140–400)
PLATELET # BLD EST: ADEQUATE 10*3/UL
PO2 BLDA: 99 MMHG (ref 65–108)
POTASSIUM SERPL-SCNC: 3.8 MMOL/L (ref 3.5–5.1)
PROT SERPL-MCNC: 7.5 G/DL (ref 6.4–8.2)
PROT UR STRIP-MCNC: NEGATIVE MG/DL
RBC # BLD AUTO: 4.37 X10^6/UL (ref 4.3–5.7)
RBC #/AREA URNS HPF: (no result) /HPF (ref 0–2)
SAO2 % BLDA: 97 % (ref 92–99)
SODIUM SERPL-SCNC: 136 MMOL/L (ref 136–145)
UROBILINOGEN UR-MCNC: 1 MG/DL
WBC # BLD AUTO: 5.7 X10^3/UL (ref 4–11)
WBC #/AREA URNS HPF: (no result) /HPF (ref 0–4)

## 2020-10-22 PROCEDURE — 71275 CT ANGIOGRAPHY CHEST: CPT

## 2020-10-22 PROCEDURE — 85379 FIBRIN DEGRADATION QUANT: CPT

## 2020-10-22 PROCEDURE — 96365 THER/PROPH/DIAG IV INF INIT: CPT

## 2020-10-22 PROCEDURE — 96375 TX/PRO/DX INJ NEW DRUG ADDON: CPT

## 2020-10-22 PROCEDURE — 94640 AIRWAY INHALATION TREATMENT: CPT

## 2020-10-22 PROCEDURE — 81001 URINALYSIS AUTO W/SCOPE: CPT

## 2020-10-22 PROCEDURE — 82805 BLOOD GASES W/O2 SATURATION: CPT

## 2020-10-22 PROCEDURE — G0378 HOSPITAL OBSERVATION PER HR: HCPCS

## 2020-10-22 PROCEDURE — C1892 INTRO/SHEATH,FIXED,PEEL-AWAY: HCPCS

## 2020-10-22 PROCEDURE — 36600 WITHDRAWAL OF ARTERIAL BLOOD: CPT

## 2020-10-22 PROCEDURE — 94660 CPAP INITIATION&MGMT: CPT

## 2020-10-22 PROCEDURE — 84484 ASSAY OF TROPONIN QUANT: CPT

## 2020-10-22 PROCEDURE — 83735 ASSAY OF MAGNESIUM: CPT

## 2020-10-22 PROCEDURE — U0003 INFECTIOUS AGENT DETECTION BY NUCLEIC ACID (DNA OR RNA); SEVERE ACUTE RESPIRATORY SYNDROME CORONAVIRUS 2 (SARS-COV-2) (CORONAVIRUS DISEASE [COVID-19]), AMPLIFIED PROBE TECHNIQUE, MAKING USE OF HIGH THROUGHPUT TECHNOLOGIES AS DESCRIBED BY CMS-2020-01-R: HCPCS

## 2020-10-22 PROCEDURE — 36415 COLL VENOUS BLD VENIPUNCTURE: CPT

## 2020-10-22 PROCEDURE — 94760 N-INVAS EAR/PLS OXIMETRY 1: CPT

## 2020-10-22 PROCEDURE — 5A09357 ASSISTANCE WITH RESPIRATORY VENTILATION, LESS THAN 24 CONSECUTIVE HOURS, CONTINUOUS POSITIVE AIRWAY PRESSURE: ICD-10-PCS | Performed by: RADIOLOGY

## 2020-10-22 PROCEDURE — 93005 ELECTROCARDIOGRAM TRACING: CPT

## 2020-10-22 PROCEDURE — 85610 PROTHROMBIN TIME: CPT

## 2020-10-22 PROCEDURE — 80053 COMPREHEN METABOLIC PANEL: CPT

## 2020-10-22 PROCEDURE — 83880 ASSAY OF NATRIURETIC PEPTIDE: CPT

## 2020-10-22 PROCEDURE — 84100 ASSAY OF PHOSPHORUS: CPT

## 2020-10-22 PROCEDURE — 85025 COMPLETE CBC W/AUTO DIFF WBC: CPT

## 2020-10-22 PROCEDURE — 85007 BL SMEAR W/DIFF WBC COUNT: CPT

## 2020-10-22 PROCEDURE — 71045 X-RAY EXAM CHEST 1 VIEW: CPT

## 2020-10-22 PROCEDURE — 32555 ASPIRATE PLEURA W/ IMAGING: CPT

## 2020-10-22 PROCEDURE — 51702 INSERT TEMP BLADDER CATH: CPT

## 2020-10-22 NOTE — PDOC2
CHARANJIT ALVAREZ APRN 10/22/20 1341:


CARDIAC CONSULT


DATE OF CONSULT


Date of Consult


DATE: 10/22/20 


TIME: 13:33





REASON FOR CONSULT


Reason for Consult:


CHF





REFERRING PHYSICIAN


Referring Physician:


Dr. Hernandez





SOURCE


Source:  Chart review, Patient





HISTORY OF PRESENT ILLNESS


HISTORY OF PRESENT ILLNESS


This is an 81 yo male who presented secondary to shortness of breath. History 

limited as patient is requiring BiPAP presently. Has been short of breath for 

the last couple of weeks. Progressively worsened in the last week. Associated 

with LE edema. Denies any chest pain, dizziness, diaphoresis, or nausea/vomitin

g. Has h/o recurrent pleural effusion requiring thoracentesis.





PAST MEDICAL HISTORY


Past Medical History


Cardiovascular:  CAD, CHF, HTN


Pulmonary:  COPD, pleural effusion


Musculoskeletal:  Osteoarthritis


GERD





PAST SURGICAL HISTORY


Past Surgical History:  Pacemaker, Colon Resection, Other (back surgery )





FAMILY HISTORY


Family History:  High Cholestrol, Hypertension





CURRENT MEDICATIONS


CURRENT MEDICATIONS





Current Medications








 Medications


  (Trade)  Dose


 Ordered  Sig/Reyna


 Route


 PRN Reason  Start Time


 Stop Time Status Last Admin


Dose Admin


 


 Iohexol


  (Omnipaque 350


 Mg/ml)  90 ml  1X  ONCE


 IV


   10/22/20 11:00


 10/22/20 11:01 DC 10/22/20 11:00














ALLERGIES


ALLERGIES:  


Coded Allergies:  


     No Known Drug Allergies (Unverified , 5/25/18)





ROS


Review of System


14 point ROS conducted with pertinent positives noted above in hPI





PHYSICAL EXAM


General:  Alert, Cooperative, No acute distress, Other (drowsy)


HEENT:  Atraumatic, Mucous membr. moist/pink


Lungs:  Other (diminished )


Heart:  Regular rate


Abdomen:  Soft


Extremities:  Other (1+ bilateral LE edema )


Neuro:  Sensation intact


Psych/Mental Status:  Other (drowsy)


MUSCULOSKELETAL:  Osteoarthritic changes both hands





VITALS/I&O


VITALS/I&O:





                                   Vital Signs








  Date Time  Temp Pulse Resp B/P (MAP) Pulse Ox O2 Delivery O2 Flow Rate FiO2


 


10/22/20 10:00  60  144/85 (104)    


 


10/22/20 09:25     94 BiPAP/CPAP  


 


10/22/20 09:00       3.0 


 


10/22/20 08:55   24     


 


10/22/20 07:57 98.8       





 98.8       











LABS


Lab:





                                Laboratory Tests








Test


 10/22/20


08:45 10/22/20


09:10 10/22/20


09:30


 


White Blood Count


 5.7 x10^3/uL


(4.0-11.0) 


 





 


Red Blood Count


 4.37 x10^6/uL


(4.30-5.70) 


 





 


Hemoglobin


 11.0 g/dL


(13.0-17.5)  L 


 





 


Hematocrit


 34.7 %


(39.0-53.0)  L 


 





 


Mean Corpuscular Volume


 80 fL ()


 


 





 


Mean Corpuscular Hemoglobin 25 pg (25-35)    


 


Mean Corpuscular Hemoglobin


Concent 32 g/dL


(31-37) 


 





 


Red Cell Distribution Width


 20.8 %


(11.5-14.5)  H 


 





 


Platelet Count


 286 x10^3/uL


(140-400) 


 





 


Neutrophils (%) (Auto) 85 % (31-73)  H  


 


Lymphocytes (%) (Auto) 7 % (24-48)  L  


 


Monocytes (%) (Auto) 7 % (0-9)    


 


Eosinophils (%) (Auto) 0 % (0-3)    


 


Basophils (%) (Auto) 1 % (0-3)    


 


Neutrophils # (Auto)


 4.8 x10^3/uL


(1.8-7.7) 


 





 


Lymphocytes # (Auto)


 0.4 x10^3/uL


(1.0-4.8)  L 


 





 


Monocytes # (Auto)


 0.4 x10^3/uL


(0.0-1.1) 


 





 


Eosinophils # (Auto)


 0.0 x10^3/uL


(0.0-0.7) 


 





 


Basophils # (Auto)


 0.0 x10^3/uL


(0.0-0.2) 


 





 


Platelet Estimate


 Adequate


(ADEQUATE) 


 





 


Large Platelets Present    


 


Anisocytosis Mod    


 


D-Dimer (Tamiko)


 3.32 ug/mlFEU


(0.00-0.50)  H 


 





 


Sodium Level


 136 mmol/L


(136-145) 


 





 


Potassium Level


 3.8 mmol/L


(3.5-5.1) 


 





 


Chloride Level


 100 mmol/L


() 


 





 


Carbon Dioxide Level


 25 mmol/L


(21-32) 


 





 


Anion Gap 11 (6-14)    


 


Blood Urea Nitrogen


 15 mg/dL


(8-26) 


 





 


Creatinine


 1.1 mg/dL


(0.7-1.3) 


 





 


Estimated GFR


(Cockcroft-Gault) 77.9  


 


 





 


BUN/Creatinine Ratio 14 (6-20)    


 


Glucose Level


 116 mg/dL


(70-99)  H 


 





 


Calcium Level


 7.8 mg/dL


(8.5-10.1)  L 


 





 


Total Bilirubin


 0.3 mg/dL


(0.2-1.0) 


 





 


Aspartate Amino Transferase


(AST) 31 U/L (15-37)


 


 





 


Alanine Aminotransferase (ALT)


 22 U/L (16-63)


 


 





 


Alkaline Phosphatase


 109 U/L


() 


 





 


Troponin I Quantitative


 0.018 ng/mL


(0.000-0.055) 


 





 


NT-Pro-B-Type Natriuretic


Peptide 57351 pg/mL


(0-449)  H 


 





 


Total Protein


 7.5 g/dL


(6.4-8.2) 


 





 


Albumin


 3.1 g/dL


(3.4-5.0)  L 


 





 


Albumin/Globulin Ratio


 0.7 (1.0-1.7)


L 


 





 


Urine Collection Type  Unknown   


 


Urine Color  Yellow   


 


Urine Clarity  Clear   


 


Urine pH


 


 5.5 (<5.0-8.0)


 





 


Urine Specific Gravity


 


 1.015


(1.000-1.030) 





 


Urine Protein


 


 Negative mg/dL


(NEG-TRACE) 





 


Urine Glucose (UA)


 


 Negative mg/dL


(NEG) 





 


Urine Ketones (Stick)


 


 Negative mg/dL


(NEG) 





 


Urine Blood


 


 Negative (NEG)


 





 


Urine Nitrite


 


 Negative (NEG)


 





 


Urine Bilirubin


 


 Negative (NEG)


 





 


Urine Urobilinogen Dipstick


 


 1.0 mg/dL (0.2


mg/dL) 





 


Urine Leukocyte Esterase


 


 Negative (NEG)


 





 


Urine RBC


 


 3-5 /HPF (0-2)


 





 


Urine WBC


 


 1-4 /HPF (0-4)


 





 


Urine Squamous Epithelial


Cells 


 Few /LPF  


 





 


Urine Bacteria


 


 0 /HPF (0-FEW)


 





 


Urine Hyaline Casts


 


 Occasional


/HPF 





 


Urine Mucus  Slight /LPF   


 


O2 Saturation   97 % (92-99)  


 


Arterial Blood pH


 


 


 7.39


(7.35-7.45)


 


Arterial Blood pCO2 at


Patient Temp 


 


 34 mmHg


(35-46)  L


 


Arterial Blood pO2 at Patient


Temp 


 


 99 mmHg


()


 


Arterial Blood HCO3


 


 


 20 mmol/L


(21-28)  L


 


Arterial Blood Base Excess


 


 


 -4 mmol/L


(-3-3)  L


 


FiO2   100/nrb  





                                Laboratory Tests


10/22/20 08:45








                                Laboratory Tests


10/22/20 08:45











ECHOCARDIOGRAM


ECHOCARDIOGRAM


6/24/19 - 2-D + DOPPLER ECHOCARDIOGRAM





* Severely reduced left ventricular systolic function, estimated ejection 

  fraction is 30%. 


* The LV is mildly dilated, severe global hypokinesis. 


* Grade II (moderate) left ventricular diastolic dysfunction. Elevated left 

  atrial pressure. 


* The right ventricle is normal size and function. M-Mode TAPSE 1.66 cm (normal 

  >1.7 cm). 


* Left Atrium: Moderately dilated. 


* No significant valvular abnormalities. 


* Small circumferential pericardial effusion. Left and right pleural effusion. 


* Estimated Peak Systolic PA Pressure 31 mmHg














<Conclusion>


Left ventricle systolic function is severely impaired.


Akinetic base to mid inferior wall.


The Ejection Fraction is 15%.


Transmitral Doppler flow pattern is Grade I-abnormal relaxation pattern.


There is a pacemaker lead in the right atrium and right ventricle.


Trace mitral regurgitation.


Mild tricuspid regurgitation.


The PA pressure was estimated at 50 mmHg.


There is no evidence of significant pericardial effusion.





DATE:     01/06/20 1240





HEART CATH


HEART CATH


Cardiac Catheterization 


DATE: 06/25/2019





ANATOMY: LEFT MAIN: Left main arose from left coronary cusp. It had 10% to 15% 

narrowing distally. It was heavily calcified. It bifurcated into the left 

anterior descending and circumflex. 


LEFT ANTERIOR DESCENDING: The left anterior descending is heavily calcified, has

60% to perhaps 70% stenosis in the proximal portion. The LAD continues on 

supplied diagonal which is small diffusely disease and then a mid vessel has a 

60% stenosis. This is a type 3 LAD. There is minimal disease distally. 


CIRCUMFLEX: The circumflex has mild irregularities supplying a 1st obtuse 

marginal which is unremarkable, and a 2nd obtuse marginal which is unremarkable.




RIGHT CORONARY ARTERY: The right coronary is dominant arising from right 

coronary cusp. It has heavy calcification with 30% stenosis in the midportion 

and a 40% to 50% just before the posterior descending and posterolateral 

arteries. The posterior descending and posterolateral arteries have mild diffuse

irregularities. 





CONCLUSIONS: 


Diffuse moderate coronary artery disease. In comparison to previous angiogram, 

there is minimal change, although proximal left anterior descending may be 

slightly more severe.


Profound hypotensive response to vasodilators and sedation. This did respond 

slowly to epinephrine and Caleb-Synephrine.





ASSESSMENT/PLAN


ASSESSMENT/PLAN


1.  Acute on chronic respiratory failure with a/c CHF and recurrent pleural 

effusion. 


2.  Acute on chronic systolic CHF. 


3.  Mixed ischemic/ non-ischemic cardiomyopathy; Echo 1/20 showed LVEF 15% . Cat

h 6/2019 with moderate, diffuse CAD as noted below.. Followed with [facility], but does

not appear that he has been seen since 6/2019


4.  CAD s/p PCI/stent to the RCA 2009; CP free. 


5.  SSS s/p PPM ([employer]); no recent device download noted 


6.  Accelerated hypertension  


7.  PAFIB; presently SR


8.  Hyperlipidemia:  Continue statin therapy


9.  Elevated ddimer; CT chest without PE





Recommendations





Diuresis with monitoring of renal function 


BiPAP support 


Resume home antiHTN therapy 


Hold Eliquis as thoracentesis may be warranted


Pulmonary consult 


Device interrogation if now recent 


Consider upgrade to AICD for primary prevention of SCD 


Supportive care





XIMENA RECIO MD 10/22/20 0487:


CARDIAC CONSULT


ASSESSMENT/PLAN


ASSESSMENT/PLAN


Patient seen and examined.  Agree with NP's assessment and plan.


Continue diuresis for ac on chr systolic HF


Resume home antihypertensives and titrate for better BP control


PAF in SR.  Hold Eliquis for possible thoracentesis


CAD and SSS s/p PPM clinically stable


Consider outpatient eval for possible upgrading PPM to ICD


Thank you for your consultation











CHARANJIT ALVAREZ           Oct 22, 2020 13:41


XIMENA RECIO MD           Oct 22, 2020 18:59

## 2020-10-22 NOTE — PHYS DOC
Past Medical History


Past Medical History:  CHF, Heart Disease, Hypertension, Other


Additional Past Medical Histor:  BRADYCARDIA


Past Surgical History:  Pacemaker, Other


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


Smoking Status:  Current Every Day Smoker


Alcohol Use:  None


Drug Use:  None





General Adult


EDM:


Chief Complaint:  SHORTNESS OF BREATH





HPI:


HPI:





80-year-old male presenting to the emergency department today with shortness of 

breath.  This started within the past week and has progressively gotten worse.  

Worse with walking.  Improved with rest.  He has had some leg swelling.  He has 

a history of heart failure.  He denies unilateral leg swelling and reports it is

bilateral.  He denies any chest pain associated with it.





Review of systems negative for abdominal pain nausea vomiting.  He denies cough.

 He denies fevers chills or rashes.  He denies recent exposure to COVID-19.  All

other review of systems negative.





ED course: 80-year-old male presenting with dyspnea.  EKG obtained and reviewed 

by myself shows sinus rhythm with a mildly tachycardic rate at 97.  ST segments 

are congruent.  Not suggestive of acute ischemia.  Patient was hypoxic in the 

triage room and was placed on oxygen.  Patient is not on oxygen typically and 

denies history of lung disease.  X-ray shows cardiomegaly with pulmonary edema 

with pleural effusions bilaterally with bibasilar opacities.  CBC shows anemia 

of 11.  Patient's work of breathing increased while he was here and thus need to

be changed over to a nonrebreather.  proBNP is elevated at 22,000.  D-dimer was 

elevated.  Urinalysis not suggestive of infection.  ABG shows mildly low bicarb.

 Angios shows no evidence of pulmonary embolism  Large bilateral pleural 

effusions with patchy airspace identified in the lungs either atelectasis or 

infiltrates.  Patient was given antibiotics for pulmonary infection.  We will 

admit the patient to the intensive care unit because he is on BiPAP.  I spoke 

with the cardiology team who is going to evaluate the patient and make 

recommendations.





Heart Score:


Risk Factors:


Risk Factors:  DM, Current or recent (<one month) smoker, HTN, HLP, family 

history of CAD, obesity.


Risk Scores:


Score 0 - 3:  2.5% MACE over next 6 weeks - Discharge Home


Score 4 - 6:  20.3% MACE over next 6 weeks - Admit for Clinical Observation


Score 7 - 10:  72.7% MACE over next 6 weeks - Early Invasive Strategies





Allergies:


Allergies:





Allergies








Coded Allergies Type Severity Reaction Last Updated Verified


 


  No Known Drug Allergies    5/25/18 No











Physical Exam:


PE:





Constitutional: Well developed, well nourished, no acute distress, non-toxic 

appearance.  Breathing comfortably on nasal cannula.  Satting 100%.


HENT: Normocephalic, atraumatic, bilateral external ears normal, oropharynx 

moist, no oral exudates, nose normal. []


Eyes: PERRLA, EOMI, conjunctiva normal, no discharge. [] 


Neck: Normal range of motion, no tenderness, supple, no stridor. [] 


Cardiovascular:Heart rate regular rhythm, no murmur []


Lungs & Thorax: Crackles bilaterally.  More pronounced at the bases.  No 

wheezing.


Abdomen: Bowel sounds normal, soft, no tenderness, no masses, no pulsatile 

masses. [] 


Skin: Warm, dry, no erythema, no rash. [] 


Back: No tenderness, no CVA tenderness. [] 


Extremities: No tenderness, no cyanosis, no clubbing, ROM intact, no edema. [] 


Neurologic: Alert and oriented X 3, normal motor function, normal sensory 

function, no focal deficits noted. []


Psychologic: Affect normal, judgement normal, mood normal. []





Current Patient Data:


Vital Signs:





                                   Vital Signs








  Date Time  Temp Pulse Resp B/P (MAP) Pulse Ox O2 Delivery O2 Flow Rate FiO2


 


10/22/20 07:57 98.8 80 24 173/97 (122) 97 Room Air 3.0 





 98.8       











EKG:


EKG:


[]





Radiology/Procedures:


Radiology/Procedures:


[]





Course & Med Decision Making:


Course & Med Decision Making


Pertinent Labs and Imaging studies reviewed. (See chart for details)





[]





Dragon Disclaimer:


Dragon Disclaimer:


This electronic medical record was generated, in whole or in part, using a voice

 recognition dictation system.





Departure


Departure


Impression:  


   Primary Impression:  


   CHF exacerbation


   Additional Impressions:  


   Bilateral pleural effusion


   Pulmonary infiltrates


   Hypoxia


Disposition:  09 ADMITTED AS INPT THIS HOSP


Admitting Physician:  HIMS


Condition:  STABLE


Referrals:  


MEHNAZ WOOD MD (PCP)





Critical Care Time


Critical care time spent was 35 minutes exclusive of procedures. Time was spent 

evaluating the patient, ordering the administration of medications, reevaluating

 the patient, discussing with the admitting provider and documenting.











KAREN HAMPTON MD               Oct 22, 2020 08:36

## 2020-10-22 NOTE — RAD
EXAM: PORTABLE CHEST 1V 10/22/2020 8:13 AM

 

CLINICAL INDICATION:Dyspnea

 

COMPARISON:Chest radiograph 2/21/2020

 

TECHNIQUE:AP upright view of the chest

 

FINDINGS:A pacemaker is unchanged. Cardiomegaly is redemonstrated. The 

lungs are hypoexpanded. There are increased small bilateral pleural 

effusions, partially loculated on the right, and bibasilar opacities. 

Pulmonary vascularity is more indistinct suggesting increased pulmonary 

edema. No pneumothorax. There is a distended loop of bowel in the left 

upper quadrant. Surgical clips are seen in the epigastric region. No acute

osseous abnormality.

 

IMPRESSION:Cardiomegaly with increased mild pulmonary edema, increased 

small pleural effusions with bibasilar opacities. 

 

Electronically signed by: Suzie Iraheta MD (10/22/2020 8:51 AM) 

[id number]

## 2020-10-22 NOTE — RAD
Examination: CT angiography chest

 

HISTORY: History of elevated d-dimer, shortness of breath

 

COMPARISON: 2/21/2020

 

TECHNIQUE: Axial CT angiographic images of the chest were performed with 

IV contrast. Coronal and sagittal 3-D MIP reformats are performed.

 

 

Exposure: One or more of the following individualized dose reduction 

techniques were utilized for this examination:  1. Automated exposure 

control  2. Adjustment of the mA and/or kV according to patient size  3. 

Use of iterative reconstruction technique

 

FINDINGS:

 

 

The central airways are patent. Mild cardiomegaly. Coronary artery 

calcifications identified. Moderate aortic atherosclerosis.

 

There is no evidence of filling defect identified in the main pulmonary 

arterial trunk and right and left main pulmonary arteries and visualized 

lobar, segmental branch of the pulmonary arteries.

 

Large right and left pleural effusions identified right greater than left.

Diffuse patchy airspace opacities identified in the bilateral lungs likely

atelectasis or infiltrates. There is volume loss identified in the right 

and left lower lobes of the lung.

 

Partially visualized peripherally calcified cystic structure identified in

the right kidney similar to prior exam.

 

Left-sided cardiac pacer is identified.

 

Moderate degenerative changes thoracic spine.

 

 

 

IMPRESSION:

 

1. No evidence of pulmonary embolism.

 

2. Large bilateral pleural effusions, right greater than left ,with patchy

airspace opacities identified in the bilateral lungs likely atelectasis or

infiltrates.

 

3. Moderate congestive changes.

 

 

 

 

 

Electronically signed by: Ovidio Garcia MD (10/22/2020 1:49 PM) [id number]

## 2020-10-23 VITALS — SYSTOLIC BLOOD PRESSURE: 150 MMHG | DIASTOLIC BLOOD PRESSURE: 86 MMHG

## 2020-10-23 VITALS — DIASTOLIC BLOOD PRESSURE: 87 MMHG | SYSTOLIC BLOOD PRESSURE: 157 MMHG

## 2020-10-23 VITALS — SYSTOLIC BLOOD PRESSURE: 114 MMHG | DIASTOLIC BLOOD PRESSURE: 66 MMHG

## 2020-10-23 VITALS — DIASTOLIC BLOOD PRESSURE: 97 MMHG | SYSTOLIC BLOOD PRESSURE: 172 MMHG

## 2020-10-23 VITALS — SYSTOLIC BLOOD PRESSURE: 159 MMHG | DIASTOLIC BLOOD PRESSURE: 87 MMHG

## 2020-10-23 VITALS — DIASTOLIC BLOOD PRESSURE: 69 MMHG | SYSTOLIC BLOOD PRESSURE: 136 MMHG

## 2020-10-23 VITALS — SYSTOLIC BLOOD PRESSURE: 157 MMHG | DIASTOLIC BLOOD PRESSURE: 87 MMHG

## 2020-10-23 VITALS — DIASTOLIC BLOOD PRESSURE: 85 MMHG | SYSTOLIC BLOOD PRESSURE: 146 MMHG

## 2020-10-23 VITALS — SYSTOLIC BLOOD PRESSURE: 127 MMHG | DIASTOLIC BLOOD PRESSURE: 73 MMHG

## 2020-10-23 VITALS — SYSTOLIC BLOOD PRESSURE: 100 MMHG | DIASTOLIC BLOOD PRESSURE: 60 MMHG

## 2020-10-23 VITALS — DIASTOLIC BLOOD PRESSURE: 77 MMHG | SYSTOLIC BLOOD PRESSURE: 137 MMHG

## 2020-10-23 VITALS — SYSTOLIC BLOOD PRESSURE: 129 MMHG | DIASTOLIC BLOOD PRESSURE: 74 MMHG

## 2020-10-23 VITALS — DIASTOLIC BLOOD PRESSURE: 56 MMHG | SYSTOLIC BLOOD PRESSURE: 87 MMHG

## 2020-10-23 VITALS — SYSTOLIC BLOOD PRESSURE: 148 MMHG | DIASTOLIC BLOOD PRESSURE: 86 MMHG

## 2020-10-23 VITALS — SYSTOLIC BLOOD PRESSURE: 137 MMHG | DIASTOLIC BLOOD PRESSURE: 76 MMHG

## 2020-10-23 VITALS — DIASTOLIC BLOOD PRESSURE: 79 MMHG | SYSTOLIC BLOOD PRESSURE: 150 MMHG

## 2020-10-23 VITALS — SYSTOLIC BLOOD PRESSURE: 97 MMHG | DIASTOLIC BLOOD PRESSURE: 51 MMHG

## 2020-10-23 LAB
ALBUMIN SERPL-MCNC: 2.7 G/DL (ref 3.4–5)
ALBUMIN/GLOB SERPL: 0.6 {RATIO} (ref 1–1.7)
ALP SERPL-CCNC: 104 U/L (ref 46–116)
ALT SERPL-CCNC: 18 U/L (ref 16–63)
ANION GAP SERPL CALC-SCNC: 10 MMOL/L (ref 6–14)
AST SERPL-CCNC: 17 U/L (ref 15–37)
BASOPHILS # BLD AUTO: 0 X10^3/UL (ref 0–0.2)
BASOPHILS NFR BLD: 1 % (ref 0–3)
BILIRUB SERPL-MCNC: 0.4 MG/DL (ref 0.2–1)
BUN SERPL-MCNC: 16 MG/DL (ref 8–26)
BUN/CREAT SERPL: 13 (ref 6–20)
CALCIUM SERPL-MCNC: 7.6 MG/DL (ref 8.5–10.1)
CHLORIDE SERPL-SCNC: 99 MMOL/L (ref 98–107)
CO2 SERPL-SCNC: 28 MMOL/L (ref 21–32)
CREAT SERPL-MCNC: 1.2 MG/DL (ref 0.7–1.3)
EOSINOPHIL NFR BLD: 0 X10^3/UL (ref 0–0.7)
EOSINOPHIL NFR BLD: 1 % (ref 0–3)
ERYTHROCYTE [DISTWIDTH] IN BLOOD BY AUTOMATED COUNT: 21.2 % (ref 11.5–14.5)
GFR SERPLBLD BASED ON 1.73 SQ M-ARVRAT: 70.5 ML/MIN
GLUCOSE SERPL-MCNC: 159 MG/DL (ref 70–99)
HCT VFR BLD CALC: 36 % (ref 39–53)
HGB BLD-MCNC: 11.4 G/DL (ref 13–17.5)
LYMPHOCYTES # BLD: 0.3 X10^3/UL (ref 1–4.8)
LYMPHOCYTES NFR BLD AUTO: 5 % (ref 24–48)
MAGNESIUM SERPL-MCNC: 1.9 MG/DL (ref 1.8–2.4)
MCH RBC QN AUTO: 25 PG (ref 25–35)
MCHC RBC AUTO-ENTMCNC: 32 G/DL (ref 31–37)
MCV RBC AUTO: 80 FL (ref 79–100)
MONO #: 0.4 X10^3/UL (ref 0–1.1)
MONOCYTES NFR BLD: 9 % (ref 0–9)
NEUT #: 4.3 X10^3/UL (ref 1.8–7.7)
NEUTROPHILS NFR BLD AUTO: 85 % (ref 31–73)
PHOSPHATE SERPL-MCNC: 5.4 MG/DL (ref 2.6–4.7)
PLATELET # BLD AUTO: 290 X10^3/UL (ref 140–400)
POTASSIUM SERPL-SCNC: 4 MMOL/L (ref 3.5–5.1)
PROT SERPL-MCNC: 7 G/DL (ref 6.4–8.2)
PROTHROMBIN TIME: 13.8 SEC (ref 11.7–14)
RBC # BLD AUTO: 4.53 X10^6/UL (ref 4.3–5.7)
SODIUM SERPL-SCNC: 137 MMOL/L (ref 136–145)
WBC # BLD AUTO: 5.1 X10^3/UL (ref 4–11)

## 2020-10-23 PROCEDURE — 0W993ZZ DRAINAGE OF RIGHT PLEURAL CAVITY, PERCUTANEOUS APPROACH: ICD-10-PCS | Performed by: RADIOLOGY

## 2020-10-23 RX ADMIN — Medication SCH MG: at 12:07

## 2020-10-23 RX ADMIN — FUROSEMIDE SCH MG: 40 TABLET ORAL at 14:40

## 2020-10-23 RX ADMIN — CARVEDILOL SCH MG: 12.5 TABLET, FILM COATED ORAL at 17:00

## 2020-10-23 RX ADMIN — IPRATROPIUM BROMIDE AND ALBUTEROL SULFATE SCH ML: .5; 3 SOLUTION RESPIRATORY (INHALATION) at 20:24

## 2020-10-23 RX ADMIN — POTASSIUM CHLORIDE SCH MEQ: 1500 TABLET, EXTENDED RELEASE ORAL at 12:08

## 2020-10-23 RX ADMIN — ASPIRIN SCH MG: 81 TABLET, COATED ORAL at 12:07

## 2020-10-23 RX ADMIN — CARVEDILOL SCH MG: 12.5 TABLET, FILM COATED ORAL at 12:07

## 2020-10-23 RX ADMIN — PANTOPRAZOLE SODIUM SCH MG: 40 TABLET, DELAYED RELEASE ORAL at 12:07

## 2020-10-23 RX ADMIN — TAMSULOSIN HYDROCHLORIDE SCH MG: 0.4 CAPSULE ORAL at 21:28

## 2020-10-23 RX ADMIN — TAMSULOSIN HYDROCHLORIDE SCH MG: 0.4 CAPSULE ORAL at 12:08

## 2020-10-23 RX ADMIN — MAGNESIUM OXIDE TAB 400 MG (241.3 MG ELEMENTAL MG) SCH MG: 400 (241.3 MG) TAB at 12:07

## 2020-10-23 RX ADMIN — LISINOPRIL SCH MG: 5 TABLET ORAL at 12:09

## 2020-10-23 RX ADMIN — FOLIC ACID SCH MG: 1 TABLET ORAL at 12:07

## 2020-10-23 RX ADMIN — APIXABAN SCH MG: 2.5 TABLET, FILM COATED ORAL at 21:28

## 2020-10-23 NOTE — NUR
SS following for discharge planning. SS reviewed pt chart and discussed with pt RN. Pt is 
from home with spouse and is currently on room air. COVID19 negative. Pt had thoracentesis 
today. SS will continue to follow for discharge planning.

## 2020-10-23 NOTE — RAD
Ultrasound-guided right-sided thoracentesis 10/23/2020 2:30 PM



Indication:  RIGHT thoracentesis,  therapeutic for CHF

  

Procedure: Informed consent was obtained. A timeout procedure was performed.

Sonographic evaluation of the right chest was performed demonstrating moderate

pleural effusion. The  right posterior chest was prepped and draped in sterile

fashion. 1% lidocaine without epinephrine was administered for local

anesthesia. Real-time ultrasonographic guidance was used in passing a 5 [place]

abeoeh catheter into the  right pleural space. 1.4 L of serosanguineous pleural

fluid was removed. Samples of fluid were sent to the lab for further

evaluation per ordering physician request.   The catheter was removed and

pressure held to achieve hemostasis. A sterile dressing was applied. No

immediate complications were identified. The patient tolerated the procedure

well. 





Impression:   Right sided ultrasound-guided thoracentesis

## 2020-10-23 NOTE — PDOC
PROGRESS NOTES


Date of Service:


DATE: 10/23/20 


TIME: 19:55





Subjective


Subjective


Dyspnea improving





Objective


Objective





Vital Signs








  Date Time  Temp Pulse Resp B/P (MAP) Pulse Ox O2 Delivery O2 Flow Rate FiO2


 


10/23/20 16:52 98.2 57 19 100/60 (73) 96 Room Air  





 98.2       


 


10/23/20 11:00       3.0 














Intake and Output 


 


 10/23/20





 07:00


 


Intake Total 250 ml


 


Output Total 3150 ml


 


Balance -2900 ml


 


 


 


IV Total 250 ml


 


Output Urine Total 3150 ml











Physical Exam


Abdomen:  Soft


Heart:  Regular rate


Extremities:  Other


General:  Alert, Other


HEENT:  Atraumatic


Lungs:  Other (decreased air entry bases)


Psych/Mental Status:  Other





Assessment


Assessment


1.  Acute on chronic respiratory failure with a/c CHF and recurrent pleural 

effusion Plan for right thoracentesis by IR today


2.  Acute on chronic systolic CHF. Continue diuretics.  BP marginal - monitor 

closely. 


3.  Non-ischemic cardiomyopathy; Echo 1/20 showed LVEF 15% Consider PPM upgrade 

to ICD as outpatient


4.  CAD s/p PCI/stent to the RCA 2009; CP free. 


5.  SSS s/p PPM ([employer]); stable


6.  Accelerated hypertension: better controlled and actually low normal  


7.  PAFIB; presently SR: hold eliquis for thoracentesis


8.  Hyperlipidemia:  Continue statin therapy





Plan


Plan of Care


Problems


Medical Problems:


(1) Bilateral pleural effusion


Status: Acute  





(2) CHF exacerbation


Status: Acute  





(3) Hypoxia


Status: Acute  





(4) Pulmonary infiltrates


Status: Acute  











Comment


Review of Relevant


I have reviewed the following items dao (where applicable) has been applied.


Labs





Laboratory Tests








Test


 10/23/20


09:05


 


White Blood Count


 5.1 x10^3/uL


(4.0-11.0)


 


Red Blood Count


 4.53 x10^6/uL


(4.30-5.70)


 


Hemoglobin


 11.4 g/dL


(13.0-17.5)


 


Hematocrit


 36.0 %


(39.0-53.0)


 


Mean Corpuscular Volume 80 fL () 


 


Mean Corpuscular Hemoglobin 25 pg (25-35) 


 


Mean Corpuscular Hemoglobin


Concent 32 g/dL


(31-37)


 


Red Cell Distribution Width


 21.2 %


(11.5-14.5)


 


Platelet Count


 290 x10^3/uL


(140-400)


 


Neutrophils (%) (Auto) 85 % (31-73) 


 


Lymphocytes (%) (Auto) 5 % (24-48) 


 


Monocytes (%) (Auto) 9 % (0-9) 


 


Eosinophils (%) (Auto) 1 % (0-3) 


 


Basophils (%) (Auto) 1 % (0-3) 


 


Neutrophils # (Auto)


 4.3 x10^3/uL


(1.8-7.7)


 


Lymphocytes # (Auto)


 0.3 x10^3/uL


(1.0-4.8)


 


Monocytes # (Auto)


 0.4 x10^3/uL


(0.0-1.1)


 


Eosinophils # (Auto)


 0.0 x10^3/uL


(0.0-0.7)


 


Basophils # (Auto)


 0.0 x10^3/uL


(0.0-0.2)


 


Prothrombin Time


 13.8 SEC


(11.7-14.0)


 


Prothromb Time International


Ratio 1.1 (0.8-1.1) 





 


Sodium Level


 137 mmol/L


(136-145)


 


Potassium Level


 4.0 mmol/L


(3.5-5.1)


 


Chloride Level


 99 mmol/L


()


 


Carbon Dioxide Level


 28 mmol/L


(21-32)


 


Anion Gap 10 (6-14) 


 


Blood Urea Nitrogen


 16 mg/dL


(8-26)


 


Creatinine


 1.2 mg/dL


(0.7-1.3)


 


Estimated GFR


(Cockcroft-Gault) 70.5 





 


BUN/Creatinine Ratio 13 (6-20) 


 


Glucose Level


 159 mg/dL


(70-99)


 


Calcium Level


 7.6 mg/dL


(8.5-10.1)


 


Phosphorus Level


 5.4 mg/dL


(2.6-4.7)


 


Magnesium Level


 1.9 mg/dL


(1.8-2.4)


 


Total Bilirubin


 0.4 mg/dL


(0.2-1.0)


 


Aspartate Amino Transf


(AST/SGOT) 17 U/L (15-37) 





 


Alanine Aminotransferase


(ALT/SGPT) 18 U/L (16-63) 





 


Alkaline Phosphatase


 104 U/L


()


 


Total Protein


 7.0 g/dL


(6.4-8.2)


 


Albumin


 2.7 g/dL


(3.4-5.0)


 


Albumin/Globulin Ratio 0.6 (1.0-1.7) 








Medications





Current Medications


Albuterol/ Ipratropium (Duoneb) 3 ml RTBID NEB ;  Start 10/23/20 at 20:00


Amlodipine Besylate (Norvasc) 10 mg DAILY PO  Last administered on 10/23/20at 

12:08;  Start 10/23/20 at 12:00


Apixaban (Eliquis) 2.5 mg Q12HR PO ;  Start 10/23/20 at 21:00


Aspirin (Ecotrin) 81 mg DAILYWBKFT PO  Last administered on 10/23/20at 12:07;  

Start 10/23/20 at 12:00


Atorvastatin Calcium (Lipitor) 40 mg QHS PO ;  Start 10/23/20 at 21:00


Carvedilol (Coreg) 12.5 mg BIDWMEALS PO  Last administered on 10/23/20at 12:07; 

Start 10/23/20 at 12:00


Docusate Sodium (Colace) 100 mg PRN BID  PRN PO HARD STOOLS;  Start 10/23/20 at 

11:00


Ergocalciferol (Vitamin D2) 50,000 unit WEEKLY PO ;  Start 10/30/20 at 09:00


Folic Acid (Folic Acid) 1 mg DAILY PO  Last administered on 10/23/20at 12:07;  

Start 10/23/20 at 12:00


Furosemide (Lasix) 40 mg BID92 PO  Last administered on 10/23/20at 14:40;  Start

10/23/20 at 14:00


Lidocaine HCl (Buffered Lidocaine 1%) 3 ml STK-MED ONCE .ROUTE ;  Start 10/23/20

at 13:14;  Stop 10/23/20 at 13:14;  Status DC


Lidocaine HCl (Buffered Lidocaine 1%) 6 ml 1X  ONCE INJ  Last administered on 

10/23/20at 13:44;  Start 10/23/20 at 13:45;  Stop 10/23/20 at 13:46;  Status DC


Lisinopril (Prinivil) 5 mg DAILY PO  Last administered on 10/23/20at 12:09;  

Start 10/23/20 at 12:00


Magnesium Oxide (Magnesium Oxide) 400 mg DAILY PO  Last administered on 

10/23/20at 12:07;  Start 10/23/20 at 12:00


Pantoprazole Sodium (Protonix) 40 mg DAILYAC PO  Last administered on 10/23/20at

12:07;  Start 10/23/20 at 12:00


Potassium Chloride (Klor-Con) 20 meq DAILY PO  Last administered on 10/23/20at 

12:08;  Start 10/23/20 at 12:00


Tamsulosin HCl (Flomax) 0.4 mg BID PO  Last administered on 10/23/20at 12:08;  

Start 10/23/20 at 12:00


Thiamine Mononitrate (Vitamin B-1) 100 mg DAILY PO  Last administered on 

10/23/20at 12:07;  Start 10/23/20 at 12:00


Tramadol HCl (Ultram) 50 mg PRN Q6HRS  PRN PO PAIN Last administered on 

10/23/20at 14:40;  Start 10/23/20 at 14:15


Vitals/I & O





Vital Sign - Last 24 Hours








 10/22/20 10/22/20 10/22/20 10/22/20





 20:05 20:35 21:05 21:35


 


Pulse 66 66 68 60


 


B/P (MAP) 162/91 (114) 157/80 (105) 146/78 (100) 125/70 (88)


 


Pulse Ox 100 100 100 100


 


O2 Delivery BiPAP/CPAP BiPAP/CPAP BiPAP/CPAP BiPAP/CPAP





 10/22/20 10/22/20 10/22/20 10/22/20





 21:47 22:05 22:35 23:05


 


Pulse  66 64 68


 


B/P (MAP)  126/74 (91) 140/73 (95) 156/88 (110)


 


Pulse Ox 94 100 100 100


 


O2 Delivery BiPAP/CPAP BiPAP/CPAP BiPAP/CPAP NonRebreather Mask


 


O2 Flow Rate    15.0





 10/22/20 10/22/20 10/23/20 10/23/20





 23:16 23:35 00:15 00:30


 


Temp   98.7 





   98.7 


 


Pulse   68 


 


Resp   18 


 


B/P (MAP)  147/80 (102) 157/87 (110) 


 


Pulse Ox 100 100 98 


 


O2 Delivery NonRebreather Mask NonRebreather Mask Venturi Mask Bi-pap


 


O2 Flow Rate 15.0 15.0 10.0 


 


    





    





 10/23/20 10/23/20 10/23/20 10/23/20





 00:30 00:45 01:00 01:00


 


Pulse 67 68  68


 


Resp 18 18  18


 


B/P (MAP) 148/86 (106) 137/77 (97)  137/76 (96)


 


Pulse Ox 100 100  100


 


O2 Delivery BiPAP/CPAP BiPAP/CPAP  BiPAP/CPAP


 


O2 Flow Rate   10.0 





 10/23/20 10/23/20 10/23/20 10/23/20





 02:00 03:00 04:00 04:00


 


Temp   97.7 





   97.7 


 


Pulse 61 70 80 


 


Resp 16 18 28 


 


B/P (MAP) 127/73 (91) 129/74 (92) 150/79 (102) 


 


Pulse Ox 100 100 100 


 


O2 Delivery BiPAP/CPAP BiPAP/CPAP Venturi Mask Bi-pap


 


    





    





 10/23/20 10/23/20 10/23/20 10/23/20





 05:00 06:00 07:00 08:00


 


Temp    97.9





    97.9


 


Pulse 74 62 65 66


 


Resp 36 30 26 24


 


B/P (MAP) 159/87 (111) 146/85 (105) 157/87 (110) 172/97 (122)


 


Pulse Ox 100 97 90 97


 


O2 Delivery Venturi Mask Venturi Mask Venturi Mask Nasal Cannula


 


O2 Flow Rate    3.0


 


    





    





 10/23/20 10/23/20 10/23/20 10/23/20





 08:00 09:00 10:00 11:00


 


Pulse  78 76 68


 


Resp  17 20 22


 


B/P (MAP)   136/69 (91) 150/86 (107)


 


Pulse Ox  98 100 97


 


O2 Delivery Nasal Cannula Nasal Cannula Nasal Cannula Nasal Cannula


 


O2 Flow Rate 3.0 3.0 3.0 3.0





 10/23/20 10/23/20 10/23/20 10/23/20





 12:07 12:08 12:09 14:40


 


Pulse 59 65 65 


 


Resp    23


 


B/P (MAP) 152/87 152/81 152/81 


 


Pulse Ox    97


 


O2 Delivery    Room Air





 10/23/20 10/23/20 10/23/20 





 15:40 16:00 16:52 


 


Temp  98.5 98.2 





  98.5 98.2 


 


Pulse  61 57 


 


Resp 22 22 19 


 


B/P (MAP)  87/56 (66) 100/60 (73) 


 


Pulse Ox 99 98 96 


 


O2 Delivery Room Air Room Air Room Air 














Intake and Output   


 


 10/22/20 10/22/20 10/23/20





 15:00 23:00 07:00


 


Intake Total  250 ml 


 


Output Total  2500 ml 650 ml


 


Balance  -2250 ml -650 ml

















XIMENA RECIO MD           Oct 23, 2020 20:01

## 2020-10-23 NOTE — PDOC
PROGRESS NOTES


Date of Service:


DATE: 10/23/20 


TIME: 15:46





Chief Complaint


Chief Complaint





acute systolic CHF exacerbation


 


 Bilateral pleural effusion, R>L 


 Pulmonary infiltrates


 Hypoxia





History of Present Illness


History of Present Illness


consult IR,   thoracentesis, 


PT and OT





Vitals


Vitals





Vital Signs








  Date Time  Temp Pulse Resp B/P (MAP) Pulse Ox O2 Delivery O2 Flow Rate FiO2


 


10/23/20 15:40   22  99 Room Air  


 


10/23/20 12:09  65  152/81    


 


10/23/20 11:00       3.0 


 


10/23/20 08:00 97.9       





 97.9       











Physical Exam


General:  Alert, Cooperative, No acute distress, Other


Heart:  Regular rate


Abdomen:  Soft


Extremities:  Other





Labs


LABS





Laboratory Tests








Test


 10/23/20


09:05


 


White Blood Count


 5.1 x10^3/uL


(4.0-11.0)


 


Red Blood Count


 4.53 x10^6/uL


(4.30-5.70)


 


Hemoglobin


 11.4 g/dL


(13.0-17.5)


 


Hematocrit


 36.0 %


(39.0-53.0)


 


Mean Corpuscular Volume 80 fL () 


 


Mean Corpuscular Hemoglobin 25 pg (25-35) 


 


Mean Corpuscular Hemoglobin


Concent 32 g/dL


(31-37)


 


Red Cell Distribution Width


 21.2 %


(11.5-14.5)


 


Platelet Count


 290 x10^3/uL


(140-400)


 


Neutrophils (%) (Auto) 85 % (31-73) 


 


Lymphocytes (%) (Auto) 5 % (24-48) 


 


Monocytes (%) (Auto) 9 % (0-9) 


 


Eosinophils (%) (Auto) 1 % (0-3) 


 


Basophils (%) (Auto) 1 % (0-3) 


 


Neutrophils # (Auto)


 4.3 x10^3/uL


(1.8-7.7)


 


Lymphocytes # (Auto)


 0.3 x10^3/uL


(1.0-4.8)


 


Monocytes # (Auto)


 0.4 x10^3/uL


(0.0-1.1)


 


Eosinophils # (Auto)


 0.0 x10^3/uL


(0.0-0.7)


 


Basophils # (Auto)


 0.0 x10^3/uL


(0.0-0.2)


 


Prothrombin Time


 13.8 SEC


(11.7-14.0)


 


Prothromb Time International


Ratio 1.1 (0.8-1.1) 





 


Sodium Level


 137 mmol/L


(136-145)


 


Potassium Level


 4.0 mmol/L


(3.5-5.1)


 


Chloride Level


 99 mmol/L


()


 


Carbon Dioxide Level


 28 mmol/L


(21-32)


 


Anion Gap 10 (6-14) 


 


Blood Urea Nitrogen


 16 mg/dL


(8-26)


 


Creatinine


 1.2 mg/dL


(0.7-1.3)


 


Estimated GFR


(Cockcroft-Gault) 70.5 





 


BUN/Creatinine Ratio 13 (6-20) 


 


Glucose Level


 159 mg/dL


(70-99)


 


Calcium Level


 7.6 mg/dL


(8.5-10.1)


 


Phosphorus Level


 5.4 mg/dL


(2.6-4.7)


 


Magnesium Level


 1.9 mg/dL


(1.8-2.4)


 


Total Bilirubin


 0.4 mg/dL


(0.2-1.0)


 


Aspartate Amino Transf


(AST/SGOT) 17 U/L (15-37) 





 


Alanine Aminotransferase


(ALT/SGPT) 18 U/L (16-63) 





 


Alkaline Phosphatase


 104 U/L


()


 


Total Protein


 7.0 g/dL


(6.4-8.2)


 


Albumin


 2.7 g/dL


(3.4-5.0)


 


Albumin/Globulin Ratio 0.6 (1.0-1.7) 











Assessment and Plan


Assessmemt and Plan


Problems


Medical Problems:


(1) Bilateral pleural effusion


Status: Acute  





(2) CHF exacerbation


Status: Acute  





(3) Hypoxia


Status: Acute  





(4) Pulmonary infiltrates


Status: Acute  











Comment


Review of Relevant


I have reviewed the following items dao (where applicable) has been applied.


Labs





Laboratory Tests








Test


 10/22/20


08:45 10/22/20


09:10 10/22/20


09:30 10/23/20


09:05


 


White Blood Count


 5.7 x10^3/uL


(4.0-11.0) 


 


 5.1 x10^3/uL


(4.0-11.0)


 


Red Blood Count


 4.37 x10^6/uL


(4.30-5.70) 


 


 4.53 x10^6/uL


(4.30-5.70)


 


Hemoglobin


 11.0 g/dL


(13.0-17.5) 


 


 11.4 g/dL


(13.0-17.5)


 


Hematocrit


 34.7 %


(39.0-53.0) 


 


 36.0 %


(39.0-53.0)


 


Mean Corpuscular Volume 80 fL ()    80 fL () 


 


Mean Corpuscular Hemoglobin 25 pg (25-35)    25 pg (25-35) 


 


Mean Corpuscular Hemoglobin


Concent 32 g/dL


(31-37) 


 


 32 g/dL


(31-37)


 


Red Cell Distribution Width


 20.8 %


(11.5-14.5) 


 


 21.2 %


(11.5-14.5)


 


Platelet Count


 286 x10^3/uL


(140-400) 


 


 290 x10^3/uL


(140-400)


 


Neutrophils (%) (Auto) 85 % (31-73)    85 % (31-73) 


 


Lymphocytes (%) (Auto) 7 % (24-48)    5 % (24-48) 


 


Monocytes (%) (Auto) 7 % (0-9)    9 % (0-9) 


 


Eosinophils (%) (Auto) 0 % (0-3)    1 % (0-3) 


 


Basophils (%) (Auto) 1 % (0-3)    1 % (0-3) 


 


Neutrophils # (Auto)


 4.8 x10^3/uL


(1.8-7.7) 


 


 4.3 x10^3/uL


(1.8-7.7)


 


Lymphocytes # (Auto)


 0.4 x10^3/uL


(1.0-4.8) 


 


 0.3 x10^3/uL


(1.0-4.8)


 


Monocytes # (Auto)


 0.4 x10^3/uL


(0.0-1.1) 


 


 0.4 x10^3/uL


(0.0-1.1)


 


Eosinophils # (Auto)


 0.0 x10^3/uL


(0.0-0.7) 


 


 0.0 x10^3/uL


(0.0-0.7)


 


Basophils # (Auto)


 0.0 x10^3/uL


(0.0-0.2) 


 


 0.0 x10^3/uL


(0.0-0.2)


 


Platelet Estimate


 Adequate


(ADEQUATE) 


 


 





 


Large Platelets Present    


 


Anisocytosis Mod    


 


D-Dimer (Tamiko)


 3.32 ug/mlFEU


(0.00-0.50) 


 


 





 


Sodium Level


 136 mmol/L


(136-145) 


 


 137 mmol/L


(136-145)


 


Potassium Level


 3.8 mmol/L


(3.5-5.1) 


 


 4.0 mmol/L


(3.5-5.1)


 


Chloride Level


 100 mmol/L


() 


 


 99 mmol/L


()


 


Carbon Dioxide Level


 25 mmol/L


(21-32) 


 


 28 mmol/L


(21-32)


 


Anion Gap 11 (6-14)    10 (6-14) 


 


Blood Urea Nitrogen


 15 mg/dL


(8-26) 


 


 16 mg/dL


(8-26)


 


Creatinine


 1.1 mg/dL


(0.7-1.3) 


 


 1.2 mg/dL


(0.7-1.3)


 


Estimated GFR


(Cockcroft-Gault) 77.9 


 


 


 70.5 





 


BUN/Creatinine Ratio 14 (6-20)    13 (6-20) 


 


Glucose Level


 116 mg/dL


(70-99) 


 


 159 mg/dL


(70-99)


 


Calcium Level


 7.8 mg/dL


(8.5-10.1) 


 


 7.6 mg/dL


(8.5-10.1)


 


Total Bilirubin


 0.3 mg/dL


(0.2-1.0) 


 


 0.4 mg/dL


(0.2-1.0)


 


Aspartate Amino Transf


(AST/SGOT) 31 U/L (15-37) 


 


 


 17 U/L (15-37) 





 


Alanine Aminotransferase


(ALT/SGPT) 22 U/L (16-63) 


 


 


 18 U/L (16-63) 





 


Alkaline Phosphatase


 109 U/L


() 


 


 104 U/L


()


 


Troponin I Quantitative


 0.018 ng/mL


(0.000-0.055) 


 


 





 


NT-Pro-B-Type Natriuretic


Peptide 22019 pg/mL


(0-449) 


 


 





 


Total Protein


 7.5 g/dL


(6.4-8.2) 


 


 7.0 g/dL


(6.4-8.2)


 


Albumin


 3.1 g/dL


(3.4-5.0) 


 


 2.7 g/dL


(3.4-5.0)


 


Albumin/Globulin Ratio 0.7 (1.0-1.7)    0.6 (1.0-1.7) 


 


Urine Collection Type  Unknown   


 


Urine Color  Yellow   


 


Urine Clarity  Clear   


 


Urine pH  5.5 (<5.0-8.0)   


 


Urine Specific Gravity


 


 1.015


(1.000-1.030) 


 





 


Urine Protein


 


 Negative mg/dL


(NEG-TRACE) 


 





 


Urine Glucose (UA)


 


 Negative mg/dL


(NEG) 


 





 


Urine Ketones (Stick)


 


 Negative mg/dL


(NEG) 


 





 


Urine Blood  Negative (NEG)   


 


Urine Nitrite  Negative (NEG)   


 


Urine Bilirubin  Negative (NEG)   


 


Urine Urobilinogen Dipstick


 


 1.0 mg/dL (0.2


mg/dL) 


 





 


Urine Leukocyte Esterase  Negative (NEG)   


 


Urine RBC  3-5 /HPF (0-2)   


 


Urine WBC  1-4 /HPF (0-4)   


 


Urine Squamous Epithelial


Cells 


 Few /LPF 


 


 





 


Urine Bacteria  0 /HPF (0-FEW)   


 


Urine Hyaline Casts


 


 Occasional


/HPF 


 





 


Urine Mucus  Slight /LPF   


 


O2 Saturation   97 % (92-99)  


 


Arterial Blood pH


 


 


 7.39


(7.35-7.45) 





 


Arterial Blood pCO2 at


Patient Temp 


 


 34 mmHg


(35-46) 





 


Arterial Blood pO2 at Patient


Temp 


 


 99 mmHg


() 





 


Arterial Blood HCO3


 


 


 20 mmol/L


(21-28) 





 


Arterial Blood Base Excess


 


 


 -4 mmol/L


(-3-3) 





 


FiO2   100/nrb  


 


Coronavirus (PCR)


 


 


 Not detected


(Not Detected) 





 


Prothrombin Time


 


 


 


 13.8 SEC


(11.7-14.0)


 


Prothromb Time International


Ratio 


 


 


 1.1 (0.8-1.1) 





 


Phosphorus Level


 


 


 


 5.4 mg/dL


(2.6-4.7)


 


Magnesium Level


 


 


 


 1.9 mg/dL


(1.8-2.4)








Laboratory Tests








Test


 10/23/20


09:05


 


White Blood Count


 5.1 x10^3/uL


(4.0-11.0)


 


Red Blood Count


 4.53 x10^6/uL


(4.30-5.70)


 


Hemoglobin


 11.4 g/dL


(13.0-17.5)


 


Hematocrit


 36.0 %


(39.0-53.0)


 


Mean Corpuscular Volume 80 fL () 


 


Mean Corpuscular Hemoglobin 25 pg (25-35) 


 


Mean Corpuscular Hemoglobin


Concent 32 g/dL


(31-37)


 


Red Cell Distribution Width


 21.2 %


(11.5-14.5)


 


Platelet Count


 290 x10^3/uL


(140-400)


 


Neutrophils (%) (Auto) 85 % (31-73) 


 


Lymphocytes (%) (Auto) 5 % (24-48) 


 


Monocytes (%) (Auto) 9 % (0-9) 


 


Eosinophils (%) (Auto) 1 % (0-3) 


 


Basophils (%) (Auto) 1 % (0-3) 


 


Neutrophils # (Auto)


 4.3 x10^3/uL


(1.8-7.7)


 


Lymphocytes # (Auto)


 0.3 x10^3/uL


(1.0-4.8)


 


Monocytes # (Auto)


 0.4 x10^3/uL


(0.0-1.1)


 


Eosinophils # (Auto)


 0.0 x10^3/uL


(0.0-0.7)


 


Basophils # (Auto)


 0.0 x10^3/uL


(0.0-0.2)


 


Prothrombin Time


 13.8 SEC


(11.7-14.0)


 


Prothromb Time International


Ratio 1.1 (0.8-1.1) 





 


Sodium Level


 137 mmol/L


(136-145)


 


Potassium Level


 4.0 mmol/L


(3.5-5.1)


 


Chloride Level


 99 mmol/L


()


 


Carbon Dioxide Level


 28 mmol/L


(21-32)


 


Anion Gap 10 (6-14) 


 


Blood Urea Nitrogen


 16 mg/dL


(8-26)


 


Creatinine


 1.2 mg/dL


(0.7-1.3)


 


Estimated GFR


(Cockcroft-Gault) 70.5 





 


BUN/Creatinine Ratio 13 (6-20) 


 


Glucose Level


 159 mg/dL


(70-99)


 


Calcium Level


 7.6 mg/dL


(8.5-10.1)


 


Phosphorus Level


 5.4 mg/dL


(2.6-4.7)


 


Magnesium Level


 1.9 mg/dL


(1.8-2.4)


 


Total Bilirubin


 0.4 mg/dL


(0.2-1.0)


 


Aspartate Amino Transf


(AST/SGOT) 17 U/L (15-37) 





 


Alanine Aminotransferase


(ALT/SGPT) 18 U/L (16-63) 





 


Alkaline Phosphatase


 104 U/L


()


 


Total Protein


 7.0 g/dL


(6.4-8.2)


 


Albumin


 2.7 g/dL


(3.4-5.0)


 


Albumin/Globulin Ratio 0.6 (1.0-1.7) 








Medications





Current Medications


Iohexol (Omnipaque 350 Mg/ml) 90 ml 1X  ONCE IV  Last administered on 10/22/20at

11:00;  Start 10/22/20 at 11:00;  Stop 10/22/20 at 11:01;  Status DC


Info (CONTRAST GIVEN -- Rx MONITORING) 1 each PRN DAILY  PRN MC SEE COMMENTS;  

Start 10/22/20 at 11:00;  Stop 10/24/20 at 10:59


Furosemide (Lasix) 20 mg 1X  ONCE IVP  Last administered on 10/22/20at 15:13;  

Start 10/22/20 at 13:15;  Stop 10/22/20 at 13:16;  Status DC


Ceftriaxone Sodium (Rocephin) 1 gm 1X  ONCE IVP  Last administered on 10/22/20at

15:15;  Start 10/22/20 at 15:00;  Stop 10/22/20 at 15:01;  Status DC


Azithromycin 500 mg/Sodium Chloride 250 ml @  250 mls/hr 1X  ONCE IV ;  Start 

10/22/20 at 15:00;  Stop 10/22/20 at 15:59;  Status Cancel


Azithromycin 250 ml @  250 mls/hr 1X  ONCE IV  Last administered on 10/22/20at 

15:19;  Start 10/22/20 at 15:15;  Stop 10/22/20 at 16:14;  Status DC


Furosemide (Lasix) 40 mg 1X  ONCE IVP  Last administered on 10/22/20at 18:07;  

Start 10/22/20 at 17:45;  Stop 10/22/20 at 17:46;  Status DC


Amlodipine Besylate (Norvasc) 10 mg DAILY PO  Last administered on 10/23/20at 1

2:08;  Start 10/23/20 at 12:00


Apixaban (Eliquis) 2.5 mg Q12HR PO ;  Start 10/23/20 at 21:00


Aspirin (Ecotrin) 81 mg DAILYWBKFT PO  Last administered on 10/23/20at 12:07;  

Start 10/23/20 at 12:00


Atorvastatin Calcium (Lipitor) 40 mg QHS PO ;  Start 10/23/20 at 21:00


Carvedilol (Coreg) 12.5 mg BIDWMEALS PO  Last administered on 10/23/20at 12:07; 

Start 10/23/20 at 12:00


Docusate Sodium (Colace) 100 mg PRN BID  PRN PO HARD STOOLS;  Start 10/23/20 at 

11:00


Furosemide (Lasix) 40 mg BID92 PO  Last administered on 10/23/20at 14:40;  Start

10/23/20 at 14:00


Albuterol/ Ipratropium (Duoneb) 3 ml RTBID NEB ;  Start 10/23/20 at 20:00


Lisinopril (Prinivil) 5 mg DAILY PO  Last administered on 10/23/20at 12:09;  

Start 10/23/20 at 12:00


Potassium Chloride (Klor-Con) 20 meq DAILY PO  Last administered on 10/23/20at 

12:08;  Start 10/23/20 at 12:00


Tamsulosin HCl (Flomax) 0.4 mg BID PO  Last administered on 10/23/20at 12:08;  

Start 10/23/20 at 12:00


Thiamine Mononitrate (Vitamin B-1) 100 mg DAILY PO  Last administered on 10/23

/20at 12:07;  Start 10/23/20 at 12:00


Ergocalciferol (Vitamin D2) 50,000 unit WEEKLY PO ;  Start 10/30/20 at 09:00


Folic Acid (Folic Acid) 1 mg DAILY PO  Last administered on 10/23/20at 12:07;  

Start 10/23/20 at 12:00


Magnesium Oxide (Magnesium Oxide) 400 mg DAILY PO  Last administered on 

10/23/20at 12:07;  Start 10/23/20 at 12:00


Pantoprazole Sodium (Protonix) 40 mg DAILYAC PO  Last administered on 10/23/20at

12:07;  Start 10/23/20 at 12:00


Lidocaine HCl (Buffered Lidocaine 1%) 3 ml STK-MED ONCE .ROUTE ;  Start 10/23/20

at 13:14;  Stop 10/23/20 at 13:14;  Status DC


Lidocaine HCl (Buffered Lidocaine 1%) 6 ml 1X  ONCE INJ  Last administered on 

10/23/20at 13:44;  Start 10/23/20 at 13:45;  Stop 10/23/20 at 13:46;  Status DC


Tramadol HCl (Ultram) 50 mg PRN Q6HRS  PRN PO PAIN Last administered on 

10/23/20at 14:40;  Start 10/23/20 at 14:15





Active Scripts


Active


Amlodipine Besylate 10 Mg Tablet 10 Mg PO DAILY 30 Days


Dok (Docusate Sodium) 100 Mg Capsule 100 Mg PO PRN BID PRN 14 Days


Klor-Con M20 (Potassium Chloride) 20 Meq Tab.er.prt 20 Meq PO DAILY 14 Days


Oysco 500+D Tablet (Calcium Carbonate/Vitamin D3) 1 Each Tablet 1 Tab PO 

BIDWMEALS 30 Days


Aspirin Ec (Aspirin) 81 Mg Tablet. 81 Mg PO DAILYWBKFT 30 Days


Carvedilol ** (Carvedilol) 12.5 Mg Tablet 12.5 Mg PO BIDWMEALS 30 Days


Duoneb 0.5-3(2.5) Mg/3 Ml (Albuterol/Ipratropium) 3 Ml Ampul.neb 3 Ml NEB RTBID 

30 Days


Reported


Lisinopril 5 Mg Tablet 1 Tab PO DAILY


Eliquis (Apixaban) 2.5 Mg Tablet 2.5 Mg PO Q12HR


Folic Acid 20 Mg Capsule 1 Mg PO DAILY


Vitamin D (Cholecalciferol (Vitamin D3)) 10,000 Unit Capsule 50,000 Unit PO 

WEEKLY


Furosemide 40 Mg Tablet 40 Mg PO BID


Omeprazole 40 Mg Capsule. 1 Cap PO DAILY


Flomax (Tamsulosin Hcl) 0.4 Mg Cap.er.24h 1 Cap PO BID


Atorvastatin Calcium 40 Mg Tablet 1 Tab PO QHS


Vitamin B-1 (Thiamine Mononitrate) 100 Mg Tablet 1 Tab PO DAILY 30 Days


Magnesium (Magnesium Oxide) 400 Mg Capsule 1 Cap PO DAILY 30 Days


Vitals/I & O





Vital Sign - Last 24 Hours








 10/22/20 10/22/20 10/22/20 10/22/20





 15:49 16:05 17:05 18:05


 


Pulse  60 60 60


 


B/P (MAP)  124/81 (95) 153/90 (111) 149/88 (108)


 


Pulse Ox 92 100  100


 


O2 Delivery BiPAP/CPAP BiPAP/CPAP  BiPAP/CPAP





 10/22/20 10/22/20 10/22/20 10/22/20





 19:05 19:35 20:05 20:35


 


Pulse 64 72 66 66


 


B/P (MAP) 133/75 (94) 149/84 (105) 162/91 (114) 157/80 (105)


 


Pulse Ox 100 100 100 100


 


O2 Delivery BiPAP/CPAP BiPAP/CPAP BiPAP/CPAP BiPAP/CPAP





 10/22/20 10/22/20 10/22/20 10/22/20





 21:05 21:35 21:47 22:05


 


Pulse 68 60  66


 


B/P (MAP) 146/78 (100) 125/70 (88)  126/74 (91)


 


Pulse Ox 100 100 94 100


 


O2 Delivery BiPAP/CPAP BiPAP/CPAP BiPAP/CPAP BiPAP/CPAP





 10/22/20 10/22/20 10/22/20 10/22/20





 22:35 23:05 23:16 23:35


 


Pulse 64 68  


 


B/P (MAP) 140/73 (95) 156/88 (110)  147/80 (102)


 


Pulse Ox 100 100 100 100


 


O2 Delivery BiPAP/CPAP NonRebreather Mask NonRebreather Mask NonRebreather Mask


 


O2 Flow Rate  15.0 15.0 15.0





 10/23/20 10/23/20 10/23/20 10/23/20





 00:15 00:30 00:30 00:45


 


Temp 98.7   





 98.7   


 


Pulse 68  67 68


 


Resp 18  18 18


 


B/P (MAP) 157/87 (110)  148/86 (106) 137/77 (97)


 


Pulse Ox 98  100 100


 


O2 Delivery Venturi Mask Bi-pap BiPAP/CPAP BiPAP/CPAP


 


O2 Flow Rate 10.0   


 


    





    





 10/23/20 10/23/20 10/23/20 10/23/20





 01:00 01:00 02:00 03:00


 


Pulse  68 61 70


 


Resp  18 16 18


 


B/P (MAP)  137/76 (96) 127/73 (91) 129/74 (92)


 


Pulse Ox  100 100 100


 


O2 Delivery  BiPAP/CPAP BiPAP/CPAP BiPAP/CPAP


 


O2 Flow Rate 10.0   





 10/23/20 10/23/20 10/23/20 10/23/20





 04:00 04:00 05:00 06:00


 


Temp 97.7   





 97.7   


 


Pulse 80  74 62


 


Resp 28  36 30


 


B/P (MAP) 150/79 (102)  159/87 (111) 146/85 (105)


 


Pulse Ox 100  100 97


 


O2 Delivery Venturi Mask Bi-pap Venturi Mask Venturi Mask


 


    





    





 10/23/20 10/23/20 10/23/20 10/23/20





 07:00 08:00 08:00 09:00


 


Temp  97.9  





  97.9  


 


Pulse 65 66  78


 


Resp 26 24  17


 


B/P (MAP) 157/87 (110) 172/97 (122)  


 


Pulse Ox 90 97  98


 


O2 Delivery Venturi Mask Nasal Cannula Nasal Cannula Nasal Cannula


 


O2 Flow Rate  3.0 3.0 3.0


 


    





    





 10/23/20 10/23/20 10/23/20 10/23/20





 10:00 11:00 12:07 12:08


 


Pulse 76 68 59 65


 


Resp 20 22  


 


B/P (MAP) 136/69 (91) 150/86 (107) 152/87 152/81


 


Pulse Ox 100 97  


 


O2 Delivery Nasal Cannula Nasal Cannula  


 


O2 Flow Rate 3.0 3.0  





 10/23/20 10/23/20 10/23/20 





 12:09 14:40 15:40 


 


Pulse 65   


 


Resp  23 22 


 


B/P (MAP) 152/81   


 


Pulse Ox  97 99 


 


O2 Delivery  Room Air Room Air 














Intake and Output   


 


 10/22/20 10/22/20 10/23/20





 15:00 23:00 07:00


 


Intake Total  250 ml 


 


Output Total  2500 ml 650 ml


 


Balance  -2250 ml -650 ml











Nutrition Consultation


Dietary Evaluation:


Recommendations by RD:  Dietary education by RD, Increase Calorie Intake, 

Protein supplementation


Comments:  


Continue w/cardiac diet as ordered, honor food preferences, provide snacks  


as requested





REC Ensure BID, discussed w/RN and diet office aware, adjust flavors as  


needed pending pt preference


Expected Outcomes/Goals:  


PO intake to meet >75% est needs


Interpretation of weight loss:  >10% in 6 months





Malnutrition Findings:


Body Fat Depletion (Non Severe:  Mod to Severe


Weight Status:  Underweight





Justicifation of Admission Dx:


Justifications for Admission:


Justification of Admission Dx:  Yes (CHF, pleural effusion)











EDISON PRECIADO MD                 Oct 23, 2020 15:50

## 2020-10-23 NOTE — NUR
RN NOTE

patient transferred from 111 report received from Francisco BAZAN. this RN agrees with previous 
assessment. patient in bed call light within reach bed alarm on.

## 2020-10-23 NOTE — CONS
DATE OF CONSULTATION:  10/23/2020



ATTENDING PHYSICIAN:  Denice Ruiz MD.



REASON FOR CONSULTATION:  The patient is seen in pulmonary consultation at the

request of Dr. Ruiz for acute hypoxemic respiratory failure requiring

noninvasive ventilation.



HISTORY OF PRESENT ILLNESS:  The patient is an 80-year-old gentleman with

cardiomyopathy, ejection fraction of 15%, presented with increasing shortness of

breath started approximately a week, got worse with walking and improved with

rest.  He had some lower extremity swelling.  He was seen in the Emergency

Department.  Chest x-ray was obtained revealing bilateral pulmonary infiltrates

compatible with pulmonary edema.  The patient was given IV Lasix.



I was asked to see him in consultation for further evaluation and management.



The patient also had a CT angiogram, which revealed no evidence of pulmonary

emboli.  There was large bilateral pleural effusion, right greater than left

with patchy opacities compatible with CHF.



The patient denies fever, chills, or night sweats.  He has not been exposed to

anybody with SARS virus.



PAST MEDICAL HISTORY:  Cardiomyopathy, ejection fraction 15%; congestive heart

failure; hypertension; bradycardia; gastric tumor; tobacco use, in remission;

underlying COPD, unknown FEV1.



PAST SURGICAL HISTORY:  No recent major surgeries.



ALLERGIES:  No known drug allergies.



FAMILY HISTORY:  Coronary artery disease.



REVIEW OF SYSTEMS:

CONSTITUTIONAL:  No fever or chills.

EYES:  No change in visual acuity.

HENT:  No nasal congestion or sore throat.

PULMONARY:  As indicated above.

CARDIOVASCULAR:  As indicated above.

GASTROINTESTINAL:  No nausea, vomiting, diarrhea.

GENITOURINARY:  No dysuria or frequency.

MUSCULOSKELETAL:  No localized muscle aches or joint pains.

SKIN:  No new skin rashes.

NEUROLOGIC:  No headaches, diplopia, or blurred vision.



CURRENT MEDICATIONS:  List was reviewed.



PHYSICAL EXAMINATION:

VITAL SIGNS:  Stable.  O2 saturation was greater than 92%.

GENERAL:  On examination, the patient is currently off of BiPAP.  He was on

BiPAP overnight.  Currently on 3 liters of oxygen supplementation.  Awake,

alert, following commands.  No respiratory distress.

NECK:  Jugular venous distention was not elevated.

LUNGS:  Crackles in the bases.

CARDIOVASCULAR:  Regular rate and rhythm with S1, S2, no S3.

ABDOMEN:  Soft, nontender, nondistended.

EXTREMITIES:  No clubbing, cyanosis.  Minimal edema.

NEUROLOGICAL:  The patient was awake, alert, following commands.  A detailed

neuro exam was not performed.



LABORATORY DATA:  Reviewed.  Arterial blood gas initially on 100% nonrebreather,

pH of 7.39, PaCO2 of 34, pO2 of 99.  White count was normal.  D-dimer was

elevated.  Electrolytes were noted.  Albumin was low.



IMPRESSION:

1.  Acute hypoxemic respiratory failure secondary to acute pulmonary edema.

2.  Acute on chronic systolic heart failure.

3.  Bilateral pleural effusion.  The patient has had previous thoracentesis back

in February.  Cytology was negative for malignant cells, suspect transudative in

nature.

4.  Chronic obstructive pulmonary disease.

5.  Tobacco dependence.

6.  Cardiomyopathy, ejection fraction of 15%.

7.  Coronary artery disease, status post percutaneous coronary intervention.

8.  Status post pacemaker implantation.

9.  Accelerated hypertension.

10.  Paroxysmal atrial fibrillation.

11.  Hyperlipidemia.

12.  CT chest revealing no evidence of pulmonary embolism.



PLAN:

1.  Recommend to continue diuresis.  Monitor renal function.

2.  PRN BiPAP.

3.  Continue oxygen supplementation.

4.  Control blood pressure.

5.  If pleural effusions do not improve with diuresis, we will consider

thoracentesis.



I do appreciate the privilege in sharing in the patient's care.



Total cumulative critical care time of 45 minutes from 11:00 a.m.-11:45 a.m.

 



______________________________

JOSHUA TELLES MD



DR:  MANUEL/[email or web address]  JOB#:  [id number] / [id number]

DD:  10/23/2020 12:22  DT:  10/23/2020 13:28

## 2020-10-23 NOTE — PDOC
PULMONARY PROGRESS NOTES


DATE: 10/23/20 


TIME: 12:23


Vitals





Vital Signs








  Date Time  Temp Pulse Resp B/P (MAP) Pulse Ox O2 Delivery O2 Flow Rate FiO2


 


10/23/20 12:09  65  152/81    


 


10/23/20 11:00   22  97 Nasal Cannula 3.0 


 


10/23/20 08:00 97.9       





 97.9       








General:  Alert, No acute distress


Cardiovascular:  S1, S2


Abdomen:  Soft, Non-tender


Extremities:  Other


Labs





Laboratory Tests








Test


 10/22/20


08:45 10/22/20


09:10 10/22/20


09:30 10/23/20


09:05


 


White Blood Count


 5.7 x10^3/uL


(4.0-11.0) 


 


 5.1 x10^3/uL


(4.0-11.0)


 


Red Blood Count


 4.37 x10^6/uL


(4.30-5.70) 


 


 4.53 x10^6/uL


(4.30-5.70)


 


Hemoglobin


 11.0 g/dL


(13.0-17.5) 


 


 11.4 g/dL


(13.0-17.5)


 


Hematocrit


 34.7 %


(39.0-53.0) 


 


 36.0 %


(39.0-53.0)


 


Mean Corpuscular Volume 80 fL ()    80 fL () 


 


Mean Corpuscular Hemoglobin 25 pg (25-35)    25 pg (25-35) 


 


Mean Corpuscular Hemoglobin


Concent 32 g/dL


(31-37) 


 


 32 g/dL


(31-37)


 


Red Cell Distribution Width


 20.8 %


(11.5-14.5) 


 


 21.2 %


(11.5-14.5)


 


Platelet Count


 286 x10^3/uL


(140-400) 


 


 290 x10^3/uL


(140-400)


 


Neutrophils (%) (Auto) 85 % (31-73)    85 % (31-73) 


 


Lymphocytes (%) (Auto) 7 % (24-48)    5 % (24-48) 


 


Monocytes (%) (Auto) 7 % (0-9)    9 % (0-9) 


 


Eosinophils (%) (Auto) 0 % (0-3)    1 % (0-3) 


 


Basophils (%) (Auto) 1 % (0-3)    1 % (0-3) 


 


Neutrophils # (Auto)


 4.8 x10^3/uL


(1.8-7.7) 


 


 4.3 x10^3/uL


(1.8-7.7)


 


Lymphocytes # (Auto)


 0.4 x10^3/uL


(1.0-4.8) 


 


 0.3 x10^3/uL


(1.0-4.8)


 


Monocytes # (Auto)


 0.4 x10^3/uL


(0.0-1.1) 


 


 0.4 x10^3/uL


(0.0-1.1)


 


Eosinophils # (Auto)


 0.0 x10^3/uL


(0.0-0.7) 


 


 0.0 x10^3/uL


(0.0-0.7)


 


Basophils # (Auto)


 0.0 x10^3/uL


(0.0-0.2) 


 


 0.0 x10^3/uL


(0.0-0.2)


 


Platelet Estimate


 Adequate


(ADEQUATE) 


 


 





 


Large Platelets Present    


 


Anisocytosis Mod    


 


D-Dimer (Tamiko)


 3.32 ug/mlFEU


(0.00-0.50) 


 


 





 


Sodium Level


 136 mmol/L


(136-145) 


 


 137 mmol/L


(136-145)


 


Potassium Level


 3.8 mmol/L


(3.5-5.1) 


 


 4.0 mmol/L


(3.5-5.1)


 


Chloride Level


 100 mmol/L


() 


 


 99 mmol/L


()


 


Carbon Dioxide Level


 25 mmol/L


(21-32) 


 


 28 mmol/L


(21-32)


 


Anion Gap 11 (6-14)    10 (6-14) 


 


Blood Urea Nitrogen


 15 mg/dL


(8-26) 


 


 16 mg/dL


(8-26)


 


Creatinine


 1.1 mg/dL


(0.7-1.3) 


 


 1.2 mg/dL


(0.7-1.3)


 


Estimated GFR


(Cockcroft-Gault) 77.9 


 


 


 70.5 





 


BUN/Creatinine Ratio 14 (6-20)    13 (6-20) 


 


Glucose Level


 116 mg/dL


(70-99) 


 


 159 mg/dL


(70-99)


 


Calcium Level


 7.8 mg/dL


(8.5-10.1) 


 


 7.6 mg/dL


(8.5-10.1)


 


Total Bilirubin


 0.3 mg/dL


(0.2-1.0) 


 


 0.4 mg/dL


(0.2-1.0)


 


Aspartate Amino Transf


(AST/SGOT) 31 U/L (15-37) 


 


 


 17 U/L (15-37) 





 


Alanine Aminotransferase


(ALT/SGPT) 22 U/L (16-63) 


 


 


 18 U/L (16-63) 





 


Alkaline Phosphatase


 109 U/L


() 


 


 104 U/L


()


 


Troponin I Quantitative


 0.018 ng/mL


(0.000-0.055) 


 


 





 


NT-Pro-B-Type Natriuretic


Peptide 52949 pg/mL


(0-449) 


 


 





 


Total Protein


 7.5 g/dL


(6.4-8.2) 


 


 7.0 g/dL


(6.4-8.2)


 


Albumin


 3.1 g/dL


(3.4-5.0) 


 


 2.7 g/dL


(3.4-5.0)


 


Albumin/Globulin Ratio 0.7 (1.0-1.7)    0.6 (1.0-1.7) 


 


Urine Collection Type  Unknown   


 


Urine Color  Yellow   


 


Urine Clarity  Clear   


 


Urine pH  5.5 (<5.0-8.0)   


 


Urine Specific Gravity


 


 1.015


(1.000-1.030) 


 





 


Urine Protein


 


 Negative mg/dL


(NEG-TRACE) 


 





 


Urine Glucose (UA)


 


 Negative mg/dL


(NEG) 


 





 


Urine Ketones (Stick)


 


 Negative mg/dL


(NEG) 


 





 


Urine Blood  Negative (NEG)   


 


Urine Nitrite  Negative (NEG)   


 


Urine Bilirubin  Negative (NEG)   


 


Urine Urobilinogen Dipstick


 


 1.0 mg/dL (0.2


mg/dL) 


 





 


Urine Leukocyte Esterase  Negative (NEG)   


 


Urine RBC  3-5 /HPF (0-2)   


 


Urine WBC  1-4 /HPF (0-4)   


 


Urine Squamous Epithelial


Cells 


 Few /LPF 


 


 





 


Urine Bacteria  0 /HPF (0-FEW)   


 


Urine Hyaline Casts


 


 Occasional


/HPF 


 





 


Urine Mucus  Slight /LPF   


 


O2 Saturation   97 % (92-99)  


 


Arterial Blood pH


 


 


 7.39


(7.35-7.45) 





 


Arterial Blood pCO2 at


Patient Temp 


 


 34 mmHg


(35-46) 





 


Arterial Blood pO2 at Patient


Temp 


 


 99 mmHg


() 





 


Arterial Blood HCO3


 


 


 20 mmol/L


(21-28) 





 


Arterial Blood Base Excess


 


 


 -4 mmol/L


(-3-3) 





 


FiO2   100/nrb  


 


Phosphorus Level


 


 


 


 5.4 mg/dL


(2.6-4.7)


 


Magnesium Level


 


 


 


 1.9 mg/dL


(1.8-2.4)








Laboratory Tests








Test


 10/23/20


09:05


 


White Blood Count


 5.1 x10^3/uL


(4.0-11.0)


 


Red Blood Count


 4.53 x10^6/uL


(4.30-5.70)


 


Hemoglobin


 11.4 g/dL


(13.0-17.5)


 


Hematocrit


 36.0 %


(39.0-53.0)


 


Mean Corpuscular Volume 80 fL () 


 


Mean Corpuscular Hemoglobin 25 pg (25-35) 


 


Mean Corpuscular Hemoglobin


Concent 32 g/dL


(31-37)


 


Red Cell Distribution Width


 21.2 %


(11.5-14.5)


 


Platelet Count


 290 x10^3/uL


(140-400)


 


Neutrophils (%) (Auto) 85 % (31-73) 


 


Lymphocytes (%) (Auto) 5 % (24-48) 


 


Monocytes (%) (Auto) 9 % (0-9) 


 


Eosinophils (%) (Auto) 1 % (0-3) 


 


Basophils (%) (Auto) 1 % (0-3) 


 


Neutrophils # (Auto)


 4.3 x10^3/uL


(1.8-7.7)


 


Lymphocytes # (Auto)


 0.3 x10^3/uL


(1.0-4.8)


 


Monocytes # (Auto)


 0.4 x10^3/uL


(0.0-1.1)


 


Eosinophils # (Auto)


 0.0 x10^3/uL


(0.0-0.7)


 


Basophils # (Auto)


 0.0 x10^3/uL


(0.0-0.2)


 


Sodium Level


 137 mmol/L


(136-145)


 


Potassium Level


 4.0 mmol/L


(3.5-5.1)


 


Chloride Level


 99 mmol/L


()


 


Carbon Dioxide Level


 28 mmol/L


(21-32)


 


Anion Gap 10 (6-14) 


 


Blood Urea Nitrogen


 16 mg/dL


(8-26)


 


Creatinine


 1.2 mg/dL


(0.7-1.3)


 


Estimated GFR


(Cockcroft-Gault) 70.5 





 


BUN/Creatinine Ratio 13 (6-20) 


 


Glucose Level


 159 mg/dL


(70-99)


 


Calcium Level


 7.6 mg/dL


(8.5-10.1)


 


Phosphorus Level


 5.4 mg/dL


(2.6-4.7)


 


Magnesium Level


 1.9 mg/dL


(1.8-2.4)


 


Total Bilirubin


 0.4 mg/dL


(0.2-1.0)


 


Aspartate Amino Transf


(AST/SGOT) 17 U/L (15-37) 





 


Alanine Aminotransferase


(ALT/SGPT) 18 U/L (16-63) 





 


Alkaline Phosphatase


 104 U/L


()


 


Total Protein


 7.0 g/dL


(6.4-8.2)


 


Albumin


 2.7 g/dL


(3.4-5.0)


 


Albumin/Globulin Ratio 0.6 (1.0-1.7) 








Medications





Active Scripts








 Medications  Dose


 Route/Sig


 Max Daily Dose Days Date Category


 


 Amlodipine


 Besylate 10 Mg


 Tablet  10 Mg


 PO DAILY


  30 2/23/20 Rx


 


 Dok (Docusate


 Sodium) 100 Mg


 Capsule  100 Mg


 PO PRN BID PRN


  14 1/9/20 Rx


 


 Klor-Con M20


  (Potassium


 Chloride) 20 Meq


 Tab.er.prt  20 Meq


 PO DAILY


  14 1/9/20 Rx


 


 Oysco 500+D


 Tablet (Calcium


 Carbonate/Vitamin


 D3) 1 Each Tablet  1 Tab


 PO BIDWMEALS


  30 1/9/20 Rx


 


 Aspirin Ec


  (Aspirin) 81 Mg


 Tablet.  81 Mg


 PO DAILYWBKFT


  30 1/9/20 Rx


 


 Carvedilol **


  (Carvedilol) 12.5


 Mg Tablet  12.5 Mg


 PO BIDWMEALS


  30 1/9/20 Rx


 


 Duoneb 0.5-3(2.5)


 Mg/3 Ml


  (Albuterol/Ipratropium)


 3 Ml Ampul.neb  3 Ml


 NEB RTBID


  30 1/9/20 Rx


 


 Lisinopril 5 Mg


 Tablet  1 Tab


 PO DAILY


   1/4/20 Reported


 


 Eliquis


  (Apixaban) 2.5 Mg


 Tablet  2.5 Mg


 PO Q12HR


   1/4/20 Reported


 


 Folic Acid 20 Mg


 Capsule  1 Mg


 PO DAILY


   1/4/20 Reported


 


 Vitamin D


  (Cholecalciferol


  (Vitamin D3))


 10,000 Unit


 Capsule  50,000 Unit


 PO WEEKLY


   1/4/20 Reported


 


 Furosemide 40 Mg


 Tablet  40 Mg


 PO BID


   1/4/20 Reported


 


 Omeprazole 40 Mg


 Capsule.dr  1 Cap


 PO DAILY


   1/4/20 Reported


 


 Flomax


  (Tamsulosin Hcl)


 0.4 Mg Cap.er.24h  1 Cap


 PO BID


   1/4/20 Reported


 


 Atorvastatin


 Calcium 40 Mg


 Tablet  1 Tab


 PO QHS


   1/4/20 Reported


 


 Vitamin B-1


  (Thiamine


 Mononitrate) 100


 Mg Tablet  1 Tab


 PO DAILY


  30 1/4/20 Reported


 


 Magnesium


  (Magnesium Oxide)


 400 Mg Capsule  1 Cap


 PO DAILY


  30 1/4/20 Reported











Impression


.


Full consult dictated





Acute hypoxemic respiratory failure secondary to CHF











JOSHUA TELLES MD              Oct 23, 2020 12:23

## 2020-10-23 NOTE — NUR
Patient transfer from room 111 to room 211. Telephone report given to Jalen Chan. Patient 
belongings with patient at time of transfer.

## 2020-10-23 NOTE — NUR
Patient arrived to ICU room 111 from the ED. Venti mask at 10 L. Awake and answers questions 
appropriately. Bipap at bedside. Denies complaints. Poor historian of pmhx. Farrar in place. 
Moved over to ICU bed. Bipap placed without difficulty. VSS. Will continue to monitor.

## 2020-10-24 VITALS — DIASTOLIC BLOOD PRESSURE: 69 MMHG | SYSTOLIC BLOOD PRESSURE: 100 MMHG

## 2020-10-24 VITALS — SYSTOLIC BLOOD PRESSURE: 124 MMHG | DIASTOLIC BLOOD PRESSURE: 80 MMHG

## 2020-10-24 VITALS — DIASTOLIC BLOOD PRESSURE: 68 MMHG | SYSTOLIC BLOOD PRESSURE: 124 MMHG

## 2020-10-24 VITALS — SYSTOLIC BLOOD PRESSURE: 132 MMHG | DIASTOLIC BLOOD PRESSURE: 74 MMHG

## 2020-10-24 LAB
% LYMPHS: 14 % (ref 24–48)
% MONOS: 8 % (ref 0–10)
% SEGS: 78 % (ref 35–66)
ALBUMIN SERPL-MCNC: 2.4 G/DL (ref 3.4–5)
ALBUMIN/GLOB SERPL: 0.6 {RATIO} (ref 1–1.7)
ALP SERPL-CCNC: 89 U/L (ref 46–116)
ALT SERPL-CCNC: 16 U/L (ref 16–63)
ANION GAP SERPL CALC-SCNC: 8 MMOL/L (ref 6–14)
ANISOCYTOSIS BLD QL SMEAR: (no result)
AST SERPL-CCNC: 18 U/L (ref 15–37)
BASOPHILS # BLD AUTO: 0 X10^3/UL (ref 0–0.2)
BASOPHILS NFR BLD: 0 % (ref 0–3)
BILIRUB SERPL-MCNC: 0.2 MG/DL (ref 0.2–1)
BUN SERPL-MCNC: 21 MG/DL (ref 8–26)
BUN/CREAT SERPL: 18 (ref 6–20)
CALCIUM SERPL-MCNC: 7.2 MG/DL (ref 8.5–10.1)
CHLORIDE SERPL-SCNC: 100 MMOL/L (ref 98–107)
CO2 SERPL-SCNC: 29 MMOL/L (ref 21–32)
CREAT SERPL-MCNC: 1.2 MG/DL (ref 0.7–1.3)
EOSINOPHIL NFR BLD: 0 % (ref 0–3)
EOSINOPHIL NFR BLD: 0 X10^3/UL (ref 0–0.7)
ERYTHROCYTE [DISTWIDTH] IN BLOOD BY AUTOMATED COUNT: 20.5 % (ref 11.5–14.5)
GFR SERPLBLD BASED ON 1.73 SQ M-ARVRAT: 70.5 ML/MIN
GLUCOSE SERPL-MCNC: 109 MG/DL (ref 70–99)
HCT VFR BLD CALC: 32.6 % (ref 39–53)
HGB BLD-MCNC: 10.4 G/DL (ref 13–17.5)
HYPOCHROMIA BLD QL SMEAR: SLIGHT
LYMPHOCYTES # BLD: 0.3 X10^3/UL (ref 1–4.8)
LYMPHOCYTES NFR BLD AUTO: 6 % (ref 24–48)
MCH RBC QN AUTO: 25 PG (ref 25–35)
MCHC RBC AUTO-ENTMCNC: 32 G/DL (ref 31–37)
MCV RBC AUTO: 79 FL (ref 79–100)
MONO #: 0.5 X10^3/UL (ref 0–1.1)
MONOCYTES NFR BLD: 9 % (ref 0–9)
NEUT #: 4.8 X10^3/UL (ref 1.8–7.7)
NEUTROPHILS NFR BLD AUTO: 85 % (ref 31–73)
OVALOCYTES BLD QL SMEAR: (no result)
PLATELET # BLD AUTO: 276 X10^3/UL (ref 140–400)
PLATELET # BLD EST: ADEQUATE 10*3/UL
POIKILOCYTOSIS BLD QL SMEAR: SLIGHT
POTASSIUM SERPL-SCNC: 4.2 MMOL/L (ref 3.5–5.1)
PROT SERPL-MCNC: 6.1 G/DL (ref 6.4–8.2)
RBC # BLD AUTO: 4.12 X10^6/UL (ref 4.3–5.7)
SODIUM SERPL-SCNC: 137 MMOL/L (ref 136–145)
WBC # BLD AUTO: 5.7 X10^3/UL (ref 4–11)

## 2020-10-24 RX ADMIN — CARVEDILOL SCH MG: 12.5 TABLET, FILM COATED ORAL at 08:00

## 2020-10-24 RX ADMIN — LISINOPRIL SCH MG: 5 TABLET ORAL at 08:01

## 2020-10-24 RX ADMIN — MAGNESIUM OXIDE TAB 400 MG (241.3 MG ELEMENTAL MG) SCH MG: 400 (241.3 MG) TAB at 08:01

## 2020-10-24 RX ADMIN — FUROSEMIDE SCH MG: 40 TABLET ORAL at 08:02

## 2020-10-24 RX ADMIN — IPRATROPIUM BROMIDE AND ALBUTEROL SULFATE SCH ML: .5; 3 SOLUTION RESPIRATORY (INHALATION) at 07:46

## 2020-10-24 RX ADMIN — ASPIRIN SCH MG: 81 TABLET, COATED ORAL at 07:59

## 2020-10-24 RX ADMIN — TAMSULOSIN HYDROCHLORIDE SCH MG: 0.4 CAPSULE ORAL at 08:01

## 2020-10-24 RX ADMIN — FOLIC ACID SCH MG: 1 TABLET ORAL at 08:00

## 2020-10-24 RX ADMIN — PANTOPRAZOLE SODIUM SCH MG: 40 TABLET, DELAYED RELEASE ORAL at 08:00

## 2020-10-24 RX ADMIN — APIXABAN SCH MG: 2.5 TABLET, FILM COATED ORAL at 08:00

## 2020-10-24 RX ADMIN — Medication SCH MG: at 08:02

## 2020-10-24 RX ADMIN — POTASSIUM CHLORIDE SCH MEQ: 1500 TABLET, EXTENDED RELEASE ORAL at 10:30

## 2020-10-24 NOTE — PDOC
PULMONARY PROGRESS NOTES


DATE: 10/24/20 


TIME: 12:13


Subjective


Patient feels better wishes to go home


Vitals





Vital Signs








  Date Time  Temp Pulse Resp B/P (MAP) Pulse Ox O2 Delivery O2 Flow Rate FiO2


 


10/24/20 11:13 97.9 60 18 124/68 (86) 94 Nasal Cannula 2.0 





 97.9       








General:  Alert, No acute distress


Lungs:  Clear


Cardiovascular:  S1, S2


Abdomen:  Soft, Non-tender


Neuro Exam:  Alert


Extremities:  Other


Skin:  Warm


Labs





Laboratory Tests








Test


 10/23/20


09:05 10/24/20


04:00


 


White Blood Count


 5.1 x10^3/uL


(4.0-11.0) 5.7 x10^3/uL


(4.0-11.0)


 


Red Blood Count


 4.53 x10^6/uL


(4.30-5.70) 4.12 x10^6/uL


(4.30-5.70)


 


Hemoglobin


 11.4 g/dL


(13.0-17.5) 10.4 g/dL


(13.0-17.5)


 


Hematocrit


 36.0 %


(39.0-53.0) 32.6 %


(39.0-53.0)


 


Mean Corpuscular Volume 80 fL ()  79 fL () 


 


Mean Corpuscular Hemoglobin 25 pg (25-35)  25 pg (25-35) 


 


Mean Corpuscular Hemoglobin


Concent 32 g/dL


(31-37) 32 g/dL


(31-37)


 


Red Cell Distribution Width


 21.2 %


(11.5-14.5) 20.5 %


(11.5-14.5)


 


Platelet Count


 290 x10^3/uL


(140-400) 276 x10^3/uL


(140-400)


 


Neutrophils (%) (Auto) 85 % (31-73)  85 % (31-73) 


 


Lymphocytes (%) (Auto) 5 % (24-48)  6 % (24-48) 


 


Monocytes (%) (Auto) 9 % (0-9)  9 % (0-9) 


 


Eosinophils (%) (Auto) 1 % (0-3)  0 % (0-3) 


 


Basophils (%) (Auto) 1 % (0-3)  0 % (0-3) 


 


Neutrophils # (Auto)


 4.3 x10^3/uL


(1.8-7.7) 4.8 x10^3/uL


(1.8-7.7)


 


Lymphocytes # (Auto)


 0.3 x10^3/uL


(1.0-4.8) 0.3 x10^3/uL


(1.0-4.8)


 


Monocytes # (Auto)


 0.4 x10^3/uL


(0.0-1.1) 0.5 x10^3/uL


(0.0-1.1)


 


Eosinophils # (Auto)


 0.0 x10^3/uL


(0.0-0.7) 0.0 x10^3/uL


(0.0-0.7)


 


Basophils # (Auto)


 0.0 x10^3/uL


(0.0-0.2) 0.0 x10^3/uL


(0.0-0.2)


 


Prothrombin Time


 13.8 SEC


(11.7-14.0) 





 


Prothromb Time International


Ratio 1.1 (0.8-1.1) 


 





 


Sodium Level


 137 mmol/L


(136-145) 137 mmol/L


(136-145)


 


Potassium Level


 4.0 mmol/L


(3.5-5.1) 4.2 mmol/L


(3.5-5.1)


 


Chloride Level


 99 mmol/L


() 100 mmol/L


()


 


Carbon Dioxide Level


 28 mmol/L


(21-32) 29 mmol/L


(21-32)


 


Anion Gap 10 (6-14)  8 (6-14) 


 


Blood Urea Nitrogen


 16 mg/dL


(8-26) 21 mg/dL


(8-26)


 


Creatinine


 1.2 mg/dL


(0.7-1.3) 1.2 mg/dL


(0.7-1.3)


 


Estimated GFR


(Cockcroft-Gault) 70.5 


 70.5 





 


BUN/Creatinine Ratio 13 (6-20)  18 (6-20) 


 


Glucose Level


 159 mg/dL


(70-99) 109 mg/dL


(70-99)


 


Calcium Level


 7.6 mg/dL


(8.5-10.1) 7.2 mg/dL


(8.5-10.1)


 


Phosphorus Level


 5.4 mg/dL


(2.6-4.7) 





 


Magnesium Level


 1.9 mg/dL


(1.8-2.4) 





 


Total Bilirubin


 0.4 mg/dL


(0.2-1.0) 0.2 mg/dL


(0.2-1.0)


 


Aspartate Amino Transf


(AST/SGOT) 17 U/L (15-37) 


 18 U/L (15-37) 





 


Alanine Aminotransferase


(ALT/SGPT) 18 U/L (16-63) 


 16 U/L (16-63) 





 


Alkaline Phosphatase


 104 U/L


() 89 U/L


()


 


Total Protein


 7.0 g/dL


(6.4-8.2) 6.1 g/dL


(6.4-8.2)


 


Albumin


 2.7 g/dL


(3.4-5.0) 2.4 g/dL


(3.4-5.0)


 


Albumin/Globulin Ratio 0.6 (1.0-1.7)  0.6 (1.0-1.7) 


 


Segmented Neutrophils %  78 % (35-66) 


 


Lymphocytes %  14 % (24-48) 


 


Monocytes %  8 % (0-10) 


 


Platelet Estimate


 


 Adequate


(ADEQUATE)


 


Hypochromasia  Slight 


 


Poikilocytosis  Slight 


 


Anisocytosis  Mod 


 


Ovalocytes  Few 


 


NT-Pro-B-Type Natriuretic


Peptide 


 5200 pg/mL


(0-449)








Laboratory Tests








Test


 10/24/20


04:00


 


White Blood Count


 5.7 x10^3/uL


(4.0-11.0)


 


Red Blood Count


 4.12 x10^6/uL


(4.30-5.70)


 


Hemoglobin


 10.4 g/dL


(13.0-17.5)


 


Hematocrit


 32.6 %


(39.0-53.0)


 


Mean Corpuscular Volume 79 fL () 


 


Mean Corpuscular Hemoglobin 25 pg (25-35) 


 


Mean Corpuscular Hemoglobin


Concent 32 g/dL


(31-37)


 


Red Cell Distribution Width


 20.5 %


(11.5-14.5)


 


Platelet Count


 276 x10^3/uL


(140-400)


 


Neutrophils (%) (Auto) 85 % (31-73) 


 


Lymphocytes (%) (Auto) 6 % (24-48) 


 


Monocytes (%) (Auto) 9 % (0-9) 


 


Eosinophils (%) (Auto) 0 % (0-3) 


 


Basophils (%) (Auto) 0 % (0-3) 


 


Neutrophils # (Auto)


 4.8 x10^3/uL


(1.8-7.7)


 


Lymphocytes # (Auto)


 0.3 x10^3/uL


(1.0-4.8)


 


Monocytes # (Auto)


 0.5 x10^3/uL


(0.0-1.1)


 


Eosinophils # (Auto)


 0.0 x10^3/uL


(0.0-0.7)


 


Basophils # (Auto)


 0.0 x10^3/uL


(0.0-0.2)


 


Segmented Neutrophils % 78 % (35-66) 


 


Lymphocytes % 14 % (24-48) 


 


Monocytes % 8 % (0-10) 


 


Platelet Estimate


 Adequate


(ADEQUATE)


 


Hypochromasia Slight 


 


Poikilocytosis Slight 


 


Anisocytosis Mod 


 


Ovalocytes Few 


 


Sodium Level


 137 mmol/L


(136-145)


 


Potassium Level


 4.2 mmol/L


(3.5-5.1)


 


Chloride Level


 100 mmol/L


()


 


Carbon Dioxide Level


 29 mmol/L


(21-32)


 


Anion Gap 8 (6-14) 


 


Blood Urea Nitrogen


 21 mg/dL


(8-26)


 


Creatinine


 1.2 mg/dL


(0.7-1.3)


 


Estimated GFR


(Cockcroft-Gault) 70.5 





 


BUN/Creatinine Ratio 18 (6-20) 


 


Glucose Level


 109 mg/dL


(70-99)


 


Calcium Level


 7.2 mg/dL


(8.5-10.1)


 


Total Bilirubin


 0.2 mg/dL


(0.2-1.0)


 


Aspartate Amino Transf


(AST/SGOT) 18 U/L (15-37) 





 


Alanine Aminotransferase


(ALT/SGPT) 16 U/L (16-63) 





 


Alkaline Phosphatase


 89 U/L


()


 


NT-Pro-B-Type Natriuretic


Peptide 5200 pg/mL


(0-449)


 


Total Protein


 6.1 g/dL


(6.4-8.2)


 


Albumin


 2.4 g/dL


(3.4-5.0)


 


Albumin/Globulin Ratio 0.6 (1.0-1.7) 








Medications





Active Scripts








 Medications  Dose


 Route/Sig


 Max Daily Dose Days Date Category


 


 Amlodipine


 Besylate 10 Mg


 Tablet  10 Mg


 PO DAILY


  30 2/23/20 Rx


 


 Dok (Docusate


 Sodium) 100 Mg


 Capsule  100 Mg


 PO PRN BID PRN


  14 1/9/20 Rx


 


 Klor-Con M20


  (Potassium


 Chloride) 20 Meq


 Tab.er.prt  20 Meq


 PO DAILY


  14 1/9/20 Rx


 


 Oysco 500+D


 Tablet (Calcium


 Carbonate/Vitamin


 D3) 1 Each Tablet  1 Tab


 PO BIDWMEALS


  30 1/9/20 Rx


 


 Aspirin Ec


  (Aspirin) 81 Mg


 Tablet.dr  81 Mg


 PO DAILYWBKFT


  30 1/9/20 Rx


 


 Carvedilol **


  (Carvedilol) 12.5


 Mg Tablet  12.5 Mg


 PO BIDWMEALS


  30 1/9/20 Rx


 


 Duoneb 0.5-3(2.5)


 Mg/3 Ml


  (Albuterol/Ipratropium)


 3 Ml Ampul.neb  3 Ml


 NEB RTBID


  30 1/9/20 Rx


 


 Lisinopril 5 Mg


 Tablet  1 Tab


 PO DAILY


   1/4/20 Reported


 


 Eliquis


  (Apixaban) 2.5 Mg


 Tablet  2.5 Mg


 PO Q12HR


   1/4/20 Reported


 


 Folic Acid 20 Mg


 Capsule  1 Mg


 PO DAILY


   1/4/20 Reported


 


 Vitamin D


  (Cholecalciferol


  (Vitamin D3))


 10,000 Unit


 Capsule  50,000 Unit


 PO WEEKLY


   1/4/20 Reported


 


 Furosemide 40 Mg


 Tablet  40 Mg


 PO BID


   1/4/20 Reported


 


 Omeprazole 40 Mg


 Capsule.dr  1 Cap


 PO DAILY


   1/4/20 Reported


 


 Flomax


  (Tamsulosin Hcl)


 0.4 Mg Cap.er.24h  1 Cap


 PO BID


   1/4/20 Reported


 


 Atorvastatin


 Calcium 40 Mg


 Tablet  1 Tab


 PO QHS


   1/4/20 Reported


 


 Vitamin B-1


  (Thiamine


 Mononitrate) 100


 Mg Tablet  1 Tab


 PO DAILY


  30 1/4/20 Reported


 


 Magnesium


  (Magnesium Oxide)


 400 Mg Capsule  1 Cap


 PO DAILY


  30 1/4/20 Reported











Impression


.


IMPRESSION:


1.  Acute hypoxemic respiratory failure secondary to acute pulmonary edema.


2.  Acute on chronic systolic heart failure.


3.  Bilateral pleural effusion.  The patient has had previous thoracentesis back


in February.  Cytology was negative for malignant cells, suspect transudative in


nature.


4.  Chronic obstructive pulmonary disease.


5.  Tobacco dependence.


6.  Cardiomyopathy, ejection fraction of 15%.


7.  Coronary artery disease, status post percutaneous coronary intervention.


8.  Status post pacemaker implantation.


9.  Accelerated hypertension.


10.  Paroxysmal atrial fibrillation.


11.  Hyperlipidemia.


12.  CT chest revealing no evidence of pulmonary embolism.





Plan


.


Okay to discharge home discussed with RN


1.  Recommend to continue diuresis.  Monitor renal function.


2.  PRN BiPAP.


3.  Continue oxygen supplementation.


4.  Control blood pressure.


5.  If pleural effusions do not improve with diuresis, we will consider


thoracentesis.











JOSHUA TELLES MD              Oct 24, 2020 12:15

## 2020-10-24 NOTE — PDOC
PROGRESS NOTES


Date of Service:


DATE: 10/24/20 


TIME: 11:20





Chief Complaint


Chief Complaint


 Acute hypoxic respiratory failure 


acute on chronic systolic CHF exacerbation


ischemic  cardiomyopathy; Echo 1/20 showed LVEF 15% Consider PPM upgrade to ICD 

as outpatient





 Bilateral pleural effusion, R>L ,  s/p 1400 mls remvoed yesterday by IR


 Pulmonary infiltrates


 Hypoxia





History of Present Illness


History of Present Illness





PT and OT





Vitals


Vitals





Vital Signs








  Date Time  Temp Pulse Resp B/P (MAP) Pulse Ox O2 Delivery O2 Flow Rate FiO2


 


10/24/20 11:13 97.9 60 18 124/68 (86) 94 Nasal Cannula 2.0 





 97.9       











Physical Exam


General:  Alert, Other


Heart:  Regular rate


Abdomen:  Soft


Extremities:  Other





Labs


LABS





Laboratory Tests








Test


 10/24/20


04:00


 


White Blood Count


 5.7 x10^3/uL


(4.0-11.0)


 


Red Blood Count


 4.12 x10^6/uL


(4.30-5.70)


 


Hemoglobin


 10.4 g/dL


(13.0-17.5)


 


Hematocrit


 32.6 %


(39.0-53.0)


 


Mean Corpuscular Volume 79 fL () 


 


Mean Corpuscular Hemoglobin 25 pg (25-35) 


 


Mean Corpuscular Hemoglobin


Concent 32 g/dL


(31-37)


 


Red Cell Distribution Width


 20.5 %


(11.5-14.5)


 


Platelet Count


 276 x10^3/uL


(140-400)


 


Neutrophils (%) (Auto) 85 % (31-73) 


 


Lymphocytes (%) (Auto) 6 % (24-48) 


 


Monocytes (%) (Auto) 9 % (0-9) 


 


Eosinophils (%) (Auto) 0 % (0-3) 


 


Basophils (%) (Auto) 0 % (0-3) 


 


Neutrophils # (Auto)


 4.8 x10^3/uL


(1.8-7.7)


 


Lymphocytes # (Auto)


 0.3 x10^3/uL


(1.0-4.8)


 


Monocytes # (Auto)


 0.5 x10^3/uL


(0.0-1.1)


 


Eosinophils # (Auto)


 0.0 x10^3/uL


(0.0-0.7)


 


Basophils # (Auto)


 0.0 x10^3/uL


(0.0-0.2)


 


Segmented Neutrophils % 78 % (35-66) 


 


Lymphocytes % 14 % (24-48) 


 


Monocytes % 8 % (0-10) 


 


Platelet Estimate


 Adequate


(ADEQUATE)


 


Hypochromasia Slight 


 


Poikilocytosis Slight 


 


Anisocytosis Mod 


 


Ovalocytes Few 


 


Sodium Level


 137 mmol/L


(136-145)


 


Potassium Level


 4.2 mmol/L


(3.5-5.1)


 


Chloride Level


 100 mmol/L


()


 


Carbon Dioxide Level


 29 mmol/L


(21-32)


 


Anion Gap 8 (6-14) 


 


Blood Urea Nitrogen


 21 mg/dL


(8-26)


 


Creatinine


 1.2 mg/dL


(0.7-1.3)


 


Estimated GFR


(Cockcroft-Gault) 70.5 





 


BUN/Creatinine Ratio 18 (6-20) 


 


Glucose Level


 109 mg/dL


(70-99)


 


Calcium Level


 7.2 mg/dL


(8.5-10.1)


 


Total Bilirubin


 0.2 mg/dL


(0.2-1.0)


 


Aspartate Amino Transf


(AST/SGOT) 18 U/L (15-37) 





 


Alanine Aminotransferase


(ALT/SGPT) 16 U/L (16-63) 





 


Alkaline Phosphatase


 89 U/L


()


 


NT-Pro-B-Type Natriuretic


Peptide 5200 pg/mL


(0-449)


 


Total Protein


 6.1 g/dL


(6.4-8.2)


 


Albumin


 2.4 g/dL


(3.4-5.0)


 


Albumin/Globulin Ratio 0.6 (1.0-1.7) 











Assessment and Plan


Assessmemt and Plan


Problems


Medical Problems:


(1) Bilateral pleural effusion


Status: Acute  





(2) CHF exacerbation


Status: Acute  





(3) Hypoxia


Status: Acute  





(4) Pulmonary infiltrates


Status: Acute  











Comment


Review of Relevant


I have reviewed the following items dao (where applicable) has been applied.


Labs





Laboratory Tests








Test


 10/23/20


09:05 10/24/20


04:00


 


White Blood Count


 5.1 x10^3/uL


(4.0-11.0) 5.7 x10^3/uL


(4.0-11.0)


 


Red Blood Count


 4.53 x10^6/uL


(4.30-5.70) 4.12 x10^6/uL


(4.30-5.70)


 


Hemoglobin


 11.4 g/dL


(13.0-17.5) 10.4 g/dL


(13.0-17.5)


 


Hematocrit


 36.0 %


(39.0-53.0) 32.6 %


(39.0-53.0)


 


Mean Corpuscular Volume 80 fL ()  79 fL () 


 


Mean Corpuscular Hemoglobin 25 pg (25-35)  25 pg (25-35) 


 


Mean Corpuscular Hemoglobin


Concent 32 g/dL


(31-37) 32 g/dL


(31-37)


 


Red Cell Distribution Width


 21.2 %


(11.5-14.5) 20.5 %


(11.5-14.5)


 


Platelet Count


 290 x10^3/uL


(140-400) 276 x10^3/uL


(140-400)


 


Neutrophils (%) (Auto) 85 % (31-73)  85 % (31-73) 


 


Lymphocytes (%) (Auto) 5 % (24-48)  6 % (24-48) 


 


Monocytes (%) (Auto) 9 % (0-9)  9 % (0-9) 


 


Eosinophils (%) (Auto) 1 % (0-3)  0 % (0-3) 


 


Basophils (%) (Auto) 1 % (0-3)  0 % (0-3) 


 


Neutrophils # (Auto)


 4.3 x10^3/uL


(1.8-7.7) 4.8 x10^3/uL


(1.8-7.7)


 


Lymphocytes # (Auto)


 0.3 x10^3/uL


(1.0-4.8) 0.3 x10^3/uL


(1.0-4.8)


 


Monocytes # (Auto)


 0.4 x10^3/uL


(0.0-1.1) 0.5 x10^3/uL


(0.0-1.1)


 


Eosinophils # (Auto)


 0.0 x10^3/uL


(0.0-0.7) 0.0 x10^3/uL


(0.0-0.7)


 


Basophils # (Auto)


 0.0 x10^3/uL


(0.0-0.2) 0.0 x10^3/uL


(0.0-0.2)


 


Prothrombin Time


 13.8 SEC


(11.7-14.0) 





 


Prothromb Time International


Ratio 1.1 (0.8-1.1) 


 





 


Sodium Level


 137 mmol/L


(136-145) 137 mmol/L


(136-145)


 


Potassium Level


 4.0 mmol/L


(3.5-5.1) 4.2 mmol/L


(3.5-5.1)


 


Chloride Level


 99 mmol/L


() 100 mmol/L


()


 


Carbon Dioxide Level


 28 mmol/L


(21-32) 29 mmol/L


(21-32)


 


Anion Gap 10 (6-14)  8 (6-14) 


 


Blood Urea Nitrogen


 16 mg/dL


(8-26) 21 mg/dL


(8-26)


 


Creatinine


 1.2 mg/dL


(0.7-1.3) 1.2 mg/dL


(0.7-1.3)


 


Estimated GFR


(Cockcroft-Gault) 70.5 


 70.5 





 


BUN/Creatinine Ratio 13 (6-20)  18 (6-20) 


 


Glucose Level


 159 mg/dL


(70-99) 109 mg/dL


(70-99)


 


Calcium Level


 7.6 mg/dL


(8.5-10.1) 7.2 mg/dL


(8.5-10.1)


 


Phosphorus Level


 5.4 mg/dL


(2.6-4.7) 





 


Magnesium Level


 1.9 mg/dL


(1.8-2.4) 





 


Total Bilirubin


 0.4 mg/dL


(0.2-1.0) 0.2 mg/dL


(0.2-1.0)


 


Aspartate Amino Transf


(AST/SGOT) 17 U/L (15-37) 


 18 U/L (15-37) 





 


Alanine Aminotransferase


(ALT/SGPT) 18 U/L (16-63) 


 16 U/L (16-63) 





 


Alkaline Phosphatase


 104 U/L


() 89 U/L


()


 


Total Protein


 7.0 g/dL


(6.4-8.2) 6.1 g/dL


(6.4-8.2)


 


Albumin


 2.7 g/dL


(3.4-5.0) 2.4 g/dL


(3.4-5.0)


 


Albumin/Globulin Ratio 0.6 (1.0-1.7)  0.6 (1.0-1.7) 


 


Segmented Neutrophils %  78 % (35-66) 


 


Lymphocytes %  14 % (24-48) 


 


Monocytes %  8 % (0-10) 


 


Platelet Estimate


 


 Adequate


(ADEQUATE)


 


Hypochromasia  Slight 


 


Poikilocytosis  Slight 


 


Anisocytosis  Mod 


 


Ovalocytes  Few 


 


NT-Pro-B-Type Natriuretic


Peptide 


 5200 pg/mL


(0-449)








Laboratory Tests








Test


 10/24/20


04:00


 


White Blood Count


 5.7 x10^3/uL


(4.0-11.0)


 


Red Blood Count


 4.12 x10^6/uL


(4.30-5.70)


 


Hemoglobin


 10.4 g/dL


(13.0-17.5)


 


Hematocrit


 32.6 %


(39.0-53.0)


 


Mean Corpuscular Volume 79 fL () 


 


Mean Corpuscular Hemoglobin 25 pg (25-35) 


 


Mean Corpuscular Hemoglobin


Concent 32 g/dL


(31-37)


 


Red Cell Distribution Width


 20.5 %


(11.5-14.5)


 


Platelet Count


 276 x10^3/uL


(140-400)


 


Neutrophils (%) (Auto) 85 % (31-73) 


 


Lymphocytes (%) (Auto) 6 % (24-48) 


 


Monocytes (%) (Auto) 9 % (0-9) 


 


Eosinophils (%) (Auto) 0 % (0-3) 


 


Basophils (%) (Auto) 0 % (0-3) 


 


Neutrophils # (Auto)


 4.8 x10^3/uL


(1.8-7.7)


 


Lymphocytes # (Auto)


 0.3 x10^3/uL


(1.0-4.8)


 


Monocytes # (Auto)


 0.5 x10^3/uL


(0.0-1.1)


 


Eosinophils # (Auto)


 0.0 x10^3/uL


(0.0-0.7)


 


Basophils # (Auto)


 0.0 x10^3/uL


(0.0-0.2)


 


Segmented Neutrophils % 78 % (35-66) 


 


Lymphocytes % 14 % (24-48) 


 


Monocytes % 8 % (0-10) 


 


Platelet Estimate


 Adequate


(ADEQUATE)


 


Hypochromasia Slight 


 


Poikilocytosis Slight 


 


Anisocytosis Mod 


 


Ovalocytes Few 


 


Sodium Level


 137 mmol/L


(136-145)


 


Potassium Level


 4.2 mmol/L


(3.5-5.1)


 


Chloride Level


 100 mmol/L


()


 


Carbon Dioxide Level


 29 mmol/L


(21-32)


 


Anion Gap 8 (6-14) 


 


Blood Urea Nitrogen


 21 mg/dL


(8-26)


 


Creatinine


 1.2 mg/dL


(0.7-1.3)


 


Estimated GFR


(Cockcroft-Gault) 70.5 





 


BUN/Creatinine Ratio 18 (6-20) 


 


Glucose Level


 109 mg/dL


(70-99)


 


Calcium Level


 7.2 mg/dL


(8.5-10.1)


 


Total Bilirubin


 0.2 mg/dL


(0.2-1.0)


 


Aspartate Amino Transf


(AST/SGOT) 18 U/L (15-37) 





 


Alanine Aminotransferase


(ALT/SGPT) 16 U/L (16-63) 





 


Alkaline Phosphatase


 89 U/L


()


 


NT-Pro-B-Type Natriuretic


Peptide 5200 pg/mL


(0-449)


 


Total Protein


 6.1 g/dL


(6.4-8.2)


 


Albumin


 2.4 g/dL


(3.4-5.0)


 


Albumin/Globulin Ratio 0.6 (1.0-1.7) 








Medications





Current Medications


Iohexol (Omnipaque 350 Mg/ml) 90 ml 1X  ONCE IV  Last administered on 10/22/20at

11:00;  Start 10/22/20 at 11:00;  Stop 10/22/20 at 11:01;  Status DC


Info (CONTRAST GIVEN -- Rx MONITORING) 1 each PRN DAILY  PRN MC SEE COMMENTS;  

Start 10/22/20 at 11:00;  Stop 10/24/20 at 10:59;  Status DC


Furosemide (Lasix) 20 mg 1X  ONCE IVP  Last administered on 10/22/20at 15:13;  

Start 10/22/20 at 13:15;  Stop 10/22/20 at 13:16;  Status DC


Ceftriaxone Sodium (Rocephin) 1 gm 1X  ONCE IVP  Last administered on 10/22/20at

15:15;  Start 10/22/20 at 15:00;  Stop 10/22/20 at 15:01;  Status DC


Azithromycin 500 mg/Sodium Chloride 250 ml @  250 mls/hr 1X  ONCE IV ;  Start 

10/22/20 at 15:00;  Stop 10/22/20 at 15:59;  Status Cancel


Azithromycin 250 ml @  250 mls/hr 1X  ONCE IV  Last administered on 10/22/20at 

15:19;  Start 10/22/20 at 15:15;  Stop 10/22/20 at 16:14;  Status DC


Furosemide (Lasix) 40 mg 1X  ONCE IVP  Last administered on 10/22/20at 18:07;  

Start 10/22/20 at 17:45;  Stop 10/22/20 at 17:46;  Status DC


Amlodipine Besylate (Norvasc) 10 mg DAILY PO  Last administered on 10/24/20at 

08:00;  Start 10/23/20 at 12:00


Apixaban (Eliquis) 2.5 mg Q12HR PO  Last administered on 10/24/20at 08:00;  

Start 10/23/20 at 21:00


Aspirin (Ecotrin) 81 mg DAILYWBKFT PO  Last administered on 10/24/20at 07:59;  

Start 10/23/20 at 12:00


Atorvastatin Calcium (Lipitor) 40 mg QHS PO  Last administered on 10/23/20at 

21:28;  Start 10/23/20 at 21:00


Carvedilol (Coreg) 12.5 mg BIDWMEALS PO  Last administered on 10/24/20at 08:00; 

Start 10/23/20 at 12:00


Docusate Sodium (Colace) 100 mg PRN BID  PRN PO HARD STOOLS;  Start 10/23/20 at 

11:00


Furosemide (Lasix) 40 mg BID92 PO  Last administered on 10/24/20at 08:02;  Start

10/23/20 at 14:00


Albuterol/ Ipratropium (Duoneb) 3 ml RTBID NEB  Last administered on 10/24/20at 

07:46;  Start 10/23/20 at 20:00


Lisinopril (Prinivil) 5 mg DAILY PO  Last administered on 10/24/20at 08:01;  

Start 10/23/20 at 12:00


Potassium Chloride (Klor-Con) 20 meq DAILY PO  Last administered on 10/23/20at 

12:08;  Start 10/23/20 at 12:00


Tamsulosin HCl (Flomax) 0.4 mg BID PO  Last administered on 10/24/20at 08:01;  

Start 10/23/20 at 12:00


Thiamine Mononitrate (Vitamin B-1) 100 mg DAILY PO  Last administered on 10/2

4/20at 08:02;  Start 10/23/20 at 12:00


Ergocalciferol (Vitamin D2) 50,000 unit WEEKLY PO ;  Start 10/30/20 at 09:00


Folic Acid (Folic Acid) 1 mg DAILY PO  Last administered on 10/24/20at 08:00;  

Start 10/23/20 at 12:00


Magnesium Oxide (Magnesium Oxide) 400 mg DAILY PO  Last administered on 

10/24/20at 08:01;  Start 10/23/20 at 12:00


Pantoprazole Sodium (Protonix) 40 mg DAILYAC PO  Last administered on 10/24/20at

08:00;  Start 10/23/20 at 12:00


Lidocaine HCl (Buffered Lidocaine 1%) 3 ml STK-MED ONCE .ROUTE ;  Start 10/23/20

at 13:14;  Stop 10/23/20 at 13:14;  Status DC


Lidocaine HCl (Buffered Lidocaine 1%) 6 ml 1X  ONCE INJ  Last administered on 

10/23/20at 13:44;  Start 10/23/20 at 13:45;  Stop 10/23/20 at 13:46;  Status DC


Tramadol HCl (Ultram) 50 mg PRN Q6HRS  PRN PO PAIN Last administered on 

10/23/20at 14:40;  Start 10/23/20 at 14:15


Multivitamins (Thera M Plus) 1 tab DAILY PO  Last administered on 10/24/20at 

08:01;  Start 10/24/20 at 09:00





Active Scripts


Active


Amlodipine Besylate 10 Mg Tablet 10 Mg PO DAILY 30 Days


Dok (Docusate Sodium) 100 Mg Capsule 100 Mg PO PRN BID PRN 14 Days


Klor-Con M20 (Potassium Chloride) 20 Meq Tab.er.prt 20 Meq PO DAILY 14 Days


Oysco 500+D Tablet (Calcium Carbonate/Vitamin D3) 1 Each Tablet 1 Tab PO 

BIDWMEALS 30 Days


Aspirin Ec (Aspirin) 81 Mg Tablet. 81 Mg PO DAILYWBKFT 30 Days


Carvedilol ** (Carvedilol) 12.5 Mg Tablet 12.5 Mg PO BIDWMEALS 30 Days


Duoneb 0.5-3(2.5) Mg/3 Ml (Albuterol/Ipratropium) 3 Ml Ampul.neb 3 Ml NEB RTBID 

30 Days


Reported


Lisinopril 5 Mg Tablet 1 Tab PO DAILY


Eliquis (Apixaban) 2.5 Mg Tablet 2.5 Mg PO Q12HR


Folic Acid 20 Mg Capsule 1 Mg PO DAILY


Vitamin D (Cholecalciferol (Vitamin D3)) 10,000 Unit Capsule 50,000 Unit PO 

WEEKLY


Furosemide 40 Mg Tablet 40 Mg PO BID


Omeprazole 40 Mg Capsule.dr 1 Cap PO DAILY


Flomax (Tamsulosin Hcl) 0.4 Mg Cap.er.24h 1 Cap PO BID


Atorvastatin Calcium 40 Mg Tablet 1 Tab PO QHS


Vitamin B-1 (Thiamine Mononitrate) 100 Mg Tablet 1 Tab PO DAILY 30 Days


Magnesium (Magnesium Oxide) 400 Mg Capsule 1 Cap PO DAILY 30 Days


Vitals/I & O





Vital Sign - Last 24 Hours








 10/23/20 10/23/20 10/23/20 10/23/20





 12:07 12:08 12:09 14:40


 


Pulse 59 65 65 


 


Resp    23


 


B/P (MAP) 152/87 152/81 152/81 


 


Pulse Ox    97


 


O2 Delivery    Room Air





 10/23/20 10/23/20 10/23/20 10/23/20





 15:40 16:00 16:52 19:35


 


Temp  98.5 98.2 





  98.5 98.2 


 


Pulse  61 57 


 


Resp 22 22 19 


 


B/P (MAP)  87/56 (66) 100/60 (73) 


 


Pulse Ox 99 98 96 


 


O2 Delivery Room Air Room Air Room Air Nasal Cannula


 


O2 Flow Rate    2.0


 


    





    





 10/23/20 10/23/20 10/23/20 10/24/20





 19:58 20:27 23:16 03:13


 


Temp 97.6  98.0 98.1





 97.6  98.0 98.1


 


Pulse 60  60 60


 


Resp 22 23 21


 


B/P (MAP) 114/66 (82)  97/51 (66) 124/80 (95)


 


Pulse Ox 94 94 94 96


 


O2 Delivery Room Air Room Air Room Air Room Air


 


    





    





 10/24/20 10/24/20 10/24/20 10/24/20





 06:43 07:46 08:00 08:00


 


Temp 97.8   





 97.8   


 


Pulse 69  69 69


 


Resp 24   


 


B/P (MAP) 132/74 (93)  132/74 132/74


 


Pulse Ox 92 90  


 


O2 Delivery Room Air Room Air  


 


    





    





 10/24/20 10/24/20 10/24/20 10/24/20





 08:00 08:00 08:01 11:13


 


Temp  98.4  97.9





  98.4  97.9


 


Pulse  63 69 60


 


Resp  22  18


 


B/P (MAP)  100/69 (79) 132/74 124/68 (86)


 


Pulse Ox    94


 


O2 Delivery Room Air Room Air  Nasal Cannula


 


O2 Flow Rate    2.0














Intake and Output   


 


 10/23/20 10/23/20 10/24/20





 15:00 23:00 07:00


 


Intake Total 720 ml 250 ml 500 ml


 


Output Total 450 ml 375 ml 200 ml


 


Balance 270 ml -125 ml 300 ml











Nutrition Consultation


Dietary Evaluation:


Recommendations by RD:  Dietary education by RD, Increase Calorie Intake, 

Protein supplementation


Comments:  


Continue w/cardiac diet as ordered, honor food preferences, provide snacks  


as requested





REC Ensure BID, discussed w/RN and diet office aware, adjust flavors as  


needed pending pt preference


Expected Outcomes/Goals:  


PO intake to meet >75% est needs


Interpretation of weight loss:  >10% in 6 months





Malnutrition Findings:


Body Fat Depletion (Non Severe:  Mod to Severe


Weight Status:  Underweight





Justicifation of Admission Dx:


Justifications for Admission:


Justification of Admission Dx:  Yes (CHF, pleural effusion)











EDISON PRECIADO MD                 Oct 24, 2020 11:22

## 2020-10-24 NOTE — PDOC3
Discharge Summary


Visit Information


Date of Admission:  Oct 22, 2020


Date of Discharge:  Oct 24, 2020


Final Diagnosis





 Acute hypoxic respiratory failure 


acute on chronic systolic CHF exacerbation


ischemic  cardiomyopathy; Echo 1/20 showed LVEF 15% Consider PPM upgrade to ICD 

as outpatient





 Bilateral pleural effusion, R>L ,  s/p 1400 mls remvoed yesterday by IR


 Pulmonary infiltrates


 Hypoxia








Problems


Medical Problems:


(1) Bilateral pleural effusion


Status: Acute  





(2) CHF exacerbation


Status: Acute  





(3) Hypoxia


Status: Acute  





(4) Pulmonary infiltrates


Status: Acute  








Brief Hospital Course


Allergies





                                    Allergies








Coded Allergies Type Severity Reaction Last Updated Verified


 


  No Known Drug Allergies    5/25/18 No








Vital Signs





Vital Signs








  Date Time  Temp Pulse Resp B/P (MAP) Pulse Ox O2 Delivery O2 Flow Rate FiO2


 


10/24/20 11:13 97.9 60 18 124/68 (86) 94 Nasal Cannula 2.0 





 97.9       








Lab Results





Laboratory Tests








Test


 10/23/20


09:05 10/24/20


04:00


 


White Blood Count


 5.1 x10^3/uL


(4.0-11.0) 5.7 x10^3/uL


(4.0-11.0)


 


Red Blood Count


 4.53 x10^6/uL


(4.30-5.70) 4.12 x10^6/uL


(4.30-5.70)


 


Hemoglobin


 11.4 g/dL


(13.0-17.5) 10.4 g/dL


(13.0-17.5)


 


Hematocrit


 36.0 %


(39.0-53.0) 32.6 %


(39.0-53.0)


 


Mean Corpuscular Volume 80 fL ()  79 fL () 


 


Mean Corpuscular Hemoglobin 25 pg (25-35)  25 pg (25-35) 


 


Mean Corpuscular Hemoglobin


Concent 32 g/dL


(31-37) 32 g/dL


(31-37)


 


Red Cell Distribution Width


 21.2 %


(11.5-14.5) 20.5 %


(11.5-14.5)


 


Platelet Count


 290 x10^3/uL


(140-400) 276 x10^3/uL


(140-400)


 


Neutrophils (%) (Auto) 85 % (31-73)  85 % (31-73) 


 


Lymphocytes (%) (Auto) 5 % (24-48)  6 % (24-48) 


 


Monocytes (%) (Auto) 9 % (0-9)  9 % (0-9) 


 


Eosinophils (%) (Auto) 1 % (0-3)  0 % (0-3) 


 


Basophils (%) (Auto) 1 % (0-3)  0 % (0-3) 


 


Neutrophils # (Auto)


 4.3 x10^3/uL


(1.8-7.7) 4.8 x10^3/uL


(1.8-7.7)


 


Lymphocytes # (Auto)


 0.3 x10^3/uL


(1.0-4.8) 0.3 x10^3/uL


(1.0-4.8)


 


Monocytes # (Auto)


 0.4 x10^3/uL


(0.0-1.1) 0.5 x10^3/uL


(0.0-1.1)


 


Eosinophils # (Auto)


 0.0 x10^3/uL


(0.0-0.7) 0.0 x10^3/uL


(0.0-0.7)


 


Basophils # (Auto)


 0.0 x10^3/uL


(0.0-0.2) 0.0 x10^3/uL


(0.0-0.2)


 


Prothrombin Time


 13.8 SEC


(11.7-14.0) 





 


Prothromb Time International


Ratio 1.1 (0.8-1.1) 


 





 


Sodium Level


 137 mmol/L


(136-145) 137 mmol/L


(136-145)


 


Potassium Level


 4.0 mmol/L


(3.5-5.1) 4.2 mmol/L


(3.5-5.1)


 


Chloride Level


 99 mmol/L


() 100 mmol/L


()


 


Carbon Dioxide Level


 28 mmol/L


(21-32) 29 mmol/L


(21-32)


 


Anion Gap 10 (6-14)  8 (6-14) 


 


Blood Urea Nitrogen


 16 mg/dL


(8-26) 21 mg/dL


(8-26)


 


Creatinine


 1.2 mg/dL


(0.7-1.3) 1.2 mg/dL


(0.7-1.3)


 


Estimated GFR


(Cockcroft-Gault) 70.5 


 70.5 





 


BUN/Creatinine Ratio 13 (6-20)  18 (6-20) 


 


Glucose Level


 159 mg/dL


(70-99) 109 mg/dL


(70-99)


 


Calcium Level


 7.6 mg/dL


(8.5-10.1) 7.2 mg/dL


(8.5-10.1)


 


Phosphorus Level


 5.4 mg/dL


(2.6-4.7) 





 


Magnesium Level


 1.9 mg/dL


(1.8-2.4) 





 


Total Bilirubin


 0.4 mg/dL


(0.2-1.0) 0.2 mg/dL


(0.2-1.0)


 


Aspartate Amino Transf


(AST/SGOT) 17 U/L (15-37) 


 18 U/L (15-37) 





 


Alanine Aminotransferase


(ALT/SGPT) 18 U/L (16-63) 


 16 U/L (16-63) 





 


Alkaline Phosphatase


 104 U/L


() 89 U/L


()


 


Total Protein


 7.0 g/dL


(6.4-8.2) 6.1 g/dL


(6.4-8.2)


 


Albumin


 2.7 g/dL


(3.4-5.0) 2.4 g/dL


(3.4-5.0)


 


Albumin/Globulin Ratio 0.6 (1.0-1.7)  0.6 (1.0-1.7) 


 


Segmented Neutrophils %  78 % (35-66) 


 


Lymphocytes %  14 % (24-48) 


 


Monocytes %  8 % (0-10) 


 


Platelet Estimate


 


 Adequate


(ADEQUATE)


 


Hypochromasia  Slight 


 


Poikilocytosis  Slight 


 


Anisocytosis  Mod 


 


Ovalocytes  Few 


 


NT-Pro-B-Type Natriuretic


Peptide 


 5200 pg/mL


(0-449)








Laboratory Tests








Test


 10/24/20


04:00


 


White Blood Count


 5.7 x10^3/uL


(4.0-11.0)


 


Red Blood Count


 4.12 x10^6/uL


(4.30-5.70)


 


Hemoglobin


 10.4 g/dL


(13.0-17.5)


 


Hematocrit


 32.6 %


(39.0-53.0)


 


Mean Corpuscular Volume 79 fL () 


 


Mean Corpuscular Hemoglobin 25 pg (25-35) 


 


Mean Corpuscular Hemoglobin


Concent 32 g/dL


(31-37)


 


Red Cell Distribution Width


 20.5 %


(11.5-14.5)


 


Platelet Count


 276 x10^3/uL


(140-400)


 


Neutrophils (%) (Auto) 85 % (31-73) 


 


Lymphocytes (%) (Auto) 6 % (24-48) 


 


Monocytes (%) (Auto) 9 % (0-9) 


 


Eosinophils (%) (Auto) 0 % (0-3) 


 


Basophils (%) (Auto) 0 % (0-3) 


 


Neutrophils # (Auto)


 4.8 x10^3/uL


(1.8-7.7)


 


Lymphocytes # (Auto)


 0.3 x10^3/uL


(1.0-4.8)


 


Monocytes # (Auto)


 0.5 x10^3/uL


(0.0-1.1)


 


Eosinophils # (Auto)


 0.0 x10^3/uL


(0.0-0.7)


 


Basophils # (Auto)


 0.0 x10^3/uL


(0.0-0.2)


 


Segmented Neutrophils % 78 % (35-66) 


 


Lymphocytes % 14 % (24-48) 


 


Monocytes % 8 % (0-10) 


 


Platelet Estimate


 Adequate


(ADEQUATE)


 


Hypochromasia Slight 


 


Poikilocytosis Slight 


 


Anisocytosis Mod 


 


Ovalocytes Few 


 


Sodium Level


 137 mmol/L


(136-145)


 


Potassium Level


 4.2 mmol/L


(3.5-5.1)


 


Chloride Level


 100 mmol/L


()


 


Carbon Dioxide Level


 29 mmol/L


(21-32)


 


Anion Gap 8 (6-14) 


 


Blood Urea Nitrogen


 21 mg/dL


(8-26)


 


Creatinine


 1.2 mg/dL


(0.7-1.3)


 


Estimated GFR


(Cockcroft-Gault) 70.5 





 


BUN/Creatinine Ratio 18 (6-20) 


 


Glucose Level


 109 mg/dL


(70-99)


 


Calcium Level


 7.2 mg/dL


(8.5-10.1)


 


Total Bilirubin


 0.2 mg/dL


(0.2-1.0)


 


Aspartate Amino Transf


(AST/SGOT) 18 U/L (15-37) 





 


Alanine Aminotransferase


(ALT/SGPT) 16 U/L (16-63) 





 


Alkaline Phosphatase


 89 U/L


()


 


NT-Pro-B-Type Natriuretic


Peptide 5200 pg/mL


(0-449)


 


Total Protein


 6.1 g/dL


(6.4-8.2)


 


Albumin


 2.4 g/dL


(3.4-5.0)


 


Albumin/Globulin Ratio 0.6 (1.0-1.7) 








Brief Hospital Course


Mr. Barber  is a 80 old male,  admit with waekness and shortness of breath, 

CHF ae,   was r/o COVID for 24 hours,  PUI, icu admit


thoracentesis done and he felt much better


ambulatory with cane with PT and Ot for DC





Discharge Information


Condition at Discharge:  Improved


Follow Up:  Weeks


Disposition/Orders:  D/C to Home w/ HH


Scheduled


Amlodipine Besylate (Amlodipine Besylate) 10 Mg Tablet, 10 MG PO DAILY for HTN 

for 30 Days, #30


   Prescribed by: OLIVIA EARL MD on 2/23/20 1014


   Last Action: Continued on 10/23/20 1051 by EDISON MARSH


Apixaban (Eliquis) 2.5 Mg Tablet, 2.5 MG PO Q12HR for f, (Reported)


   Entered as Reported by: WALTER GUPTA on 1/4/20 1528


   Last Action: Continued on 10/23/20 1051 by EDISON MARSH


Aspirin (Aspirin Ec) 81 Mg Tablet.dr, 81 MG PO DAILYWBKFT for HEART HEALTH for 

30 Days, #30


   Prescribed by: HERMINIO WILKERSON MD on 1/9/20 1041


   Last Action: Continued on 10/23/20 1051 by EDISON MARSH


Atorvastatin Calcium (Atorvastatin Calcium) 40 Mg Tablet, 1 TAB PO QHS for  HLD,

#90 Ref 3 (Reported)


   Entered as Reported by: WALTER GUPTA on 1/4/20 1528


   Last Action: Continued on 10/23/20 1051 by EDISON MARSH


Calcium Carbonate/Vitamin D3 (Oysco 500+D Tablet) 1 Each Tablet, 1 TAB PO 

BIDWMEALS for SUPPLEMENT for 30 Days, #60


   Prescribed by: HERMINIO WILKERSON MD on 1/9/20 1041


Carvedilol (Carvedilol **) 12.5 Mg Tablet, 12.5 MG PO BIDWMEALS for CHF for 30 

Days, #60


   Prescribed by: HEMRINIO WILKERSON MD on 1/9/20 1041


   Last Action: Continued on 10/23/20 1051 by EDISON MARSH


Cholecalciferol (Vitamin D3) (Vitamin D) 10,000 Unit Capsule, 50,000 UNIT PO 

WEEKLY for supp, (Reported)


   Entered as Reported by: WALTER GUPTA on 1/4/20 1528


   Last Action: Converted on 10/23/20 1051 by EDISON MARSH


Folic Acid (Folic Acid) 20 Mg Capsule, 1 MG PO DAILY for supp, (Reported)


   Entered as Reported by: WALTER GUPTA on 1/4/20 1528


   Last Action: Converted on 10/23/20 1051 by EDISON MARSH


Furosemide (Furosemide) 40 Mg Tablet, 40 MG PO BID for diuretic, (Reported)


   Entered as Reported by: WALTER GUPTA on 1/4/20 1528


   Last Action: Continued on 10/23/20 1051 by EDISON PRECIADO


Ipratropium/Albuterol Sulfate (Duoneb 0.5-3(2.5) Mg/3 Ml) 3 Ml Ampul.neb, 3 ML 

NEB RTBID for COPD for 30 Days, #60


   Prescribed by: HERMINIO WILKERSON MD on 1/9/20 1041


   Last Action: Continued on 10/23/20 1051 by EDISON MARSH


Lisinopril (Lisinopril) 5 Mg Tablet, 1 TAB PO DAILY for htn, #30 Ref 5 

(Reported)


   Entered as Reported by: WALTER GUPTA on 1/4/20 1907


   Last Action: Continued on 10/23/20 1051 by EDISON MARSH


Magnesium Oxide (Magnesium) 400 Mg Capsule, 1 CAP PO DAILY for supp for 30 Days,

#30 Ref 0 (Reported)


   Entered as Reported by: WALTER GUPTA on 1/4/20 1528


   Last Action: Converted on 10/23/20 1051 by EDISON MARSH


Omeprazole (Omeprazole) 40 Mg Capsule.dr, 1 CAP PO DAILY for gerd, #30 Ref 3 

(Reported)


   Entered as Reported by: WALTER GUPTA on 1/4/20 1528


   Last Action: Converted on 10/23/20 1051 by EDISON MARSH


Potassium Chloride (Klor-Con M20) 20 Meq Tab.er.prt, 20 MEQ PO DAILY for 

SUPPLEMENT for 14 Days, #14


   Prescribed by: HERMINIO WILKERSON MD on 1/9/20 1041


   Last Action: Continued on 10/23/20 1051 by EDISON PRECIADO


Tamsulosin Hcl (Flomax) 0.4 Mg Cap.er.24h, 1 CAP PO BID for bph, #30 Ref 11 

(Reported)


   Entered as Reported by: WALTER GUPTA on 1/4/20 1528


   Last Action: Continued on 10/23/20 1051 by EDISON PRECIADO


Thiamine Mononitrate (Vitamin B-1) 100 Mg Tablet, 1 TAB PO DAILY for supp for 30

Days, #30 Ref 0 (Reported)


   Entered as Reported by: WALTER GUPTA on 1/4/20 1528


   Last Action: Continued on 10/23/20 1051 by EDISON PRECIADO





Scheduled PRN


Docusate Sodium (Dok) 100 Mg Capsule, 100 MG PO PRN BID PRN for CONSTIPATION for

14 Days, #30


   Prescribed by: HERMINIO WILKERSON MD on 1/9/20 1041


   Last Action: Continued on 10/23/20 1051 by EDISON PRECIADO





Patient Instructions


Patient Instructions


> 30 min


face to face





Justicifation of Admission Dx:


Justifications for Admission:


Justification of Admission Dx:  Yes (CHF, pleural effusion)











EDISON PRECIADO MD                 Oct 24, 2020 12:04

## 2020-10-24 NOTE — NUR
Pt alert and oriented x4 verified, IV and telemonitor discontinued by this RN. Escorted out 
with all belongings and cane to personal vehicle by wheelchair by this RN to ER entrance. 
Education given.

## 2020-10-24 NOTE — SNU/HH DC
DISCHARGE WITH HOME HEALTH


DISCHARGE INFORMATION:


Discharge Date:  Oct 24, 2020


Final Diagnosis:


acute on chronic systolic CHF


pleural effusion


weakness








Problems


Medical Problems:


(1) Bilateral pleural effusion


Status: Acute  





(2) CHF exacerbation


Status: Acute  





(3) Hypoxia


Status: Acute  





(4) Pulmonary infiltrates


Status: Acute  





Condition on Discharge:  Stable





CODE STATUS:


Code Status:  Full





HOME HEALTH:


Face to Face:


I certify this patient is under my care and that I, or a nurse practitioner or 

physician's assistant working with me, had a face to face encounter that meets 

the physician face to face encounter requirements with this patient on 10/24


Medical Complications:  CHF


Skilled Nursing For:  Assess Cardiopulm Status


RN For Eval/Treatment:  Yes


Physical Therapy For:  Evalulation/Treatment


Pt Meets Homebound Status:  Unsteady balance w/ amb,, Fatigue w/ amb.





POST DISCHARGE ORDERS:


Activity Instructions for Disc:  Activity as tolerated


Weight Bearing Status after Di:  As tolerated


DIET AFTER DISCHARGE:  Cardiac


Wound/Incision Care:  No wound care needed





CHECKS AFTER DISCHARGE:


Checks after discharge:  Check blood press - daily, Check your Temp as needed, 

Weigh Yourself Daily





FOLLOW-UP:


Follow up with:  primary care 1-2 weeks





TREATMENT/EQUIPMENT ORDERS:


Adaptive Equipment Issued:  None





CERTIFICATION STATEMENT:


Certification Statement:


Certification Statement: Based on the above finding, I certify that this patient

is confined to the home and needs intermittent skilled nursing care, physical 

therapy and/or speech therapy, or continues to need occupational therapy.~ This 

patient is under my care, and I have initiated the establishment of the plan of 

care.~ This patient will be followed by myself or a community physician who will

periodically review the plan of care.


Home Meds


Active Scripts


Amlodipine Besylate (AMLODIPINE BESYLATE) 10 Mg Tablet, 10 MG PO DAILY for HTN 

for 30 Days, #30 TAB


   Prov:OLIVIA EARL MD         2/23/20


Docusate Sodium (DOK) 100 Mg Capsule, 100 MG PO PRN BID PRN for CONSTIPATION for

14 Days, #30 CAP


   Prov:HERMINIO WILKERSON MD         1/9/20


Potassium Chloride (KLOR-CON M20) 20 Meq Tab.er.prt, 20 MEQ PO DAILY for 

SUPPLEMENT for 14 Days, #14 TAB.SR


   Prov:HERMINIO WILKERSON MD         1/9/20


Calcium Carbonate/Vitamin D3 (OYSCO 500+D TABLET) 1 Each Tablet, 1 TAB PO 

BIDWMEALS for SUPPLEMENT for 30 Days, #60 TAB


   Prov:HERMINIO WILKERSON MD         1/9/20


Aspirin (ASPIRIN EC) 81 Mg Tablet., 81 MG PO DAILYWBKFT for HEART HEALTH for 

30 Days, #30 TAB.SR


   Prov:HERMINIO WILKERSON MD         1/9/20


Carvedilol (CARVEDILOL **) 12.5 Mg Tablet, 12.5 MG PO BIDWMEALS for CHF for 30 

Days, #60 TAB


   Prov:HERMINIO WILKERSON MD         1/9/20


Ipratropium/Albuterol Sulfate (DUONEB 0.5-3(2.5) MG/3 ML) 3 Ml Ampul.neb, 3 ML 

NEB RTBID for COPD for 30 Days, #60 EACH


   Prov:HERMINIO WILKERSON MD         1/9/20


Reported Medications


Lisinopril (LISINOPRIL) 5 Mg Tablet, 1 TAB PO DAILY for htn, #30 TAB 5 Refills


   1/4/20


Apixaban (ELIQUIS) 2.5 Mg Tablet, 2.5 MG PO Q12HR for f, TAB


   1/4/20


Folic Acid (FOLIC ACID) 20 Mg Capsule, 1 MG PO DAILY for supp, CAP


   1/4/20


Cholecalciferol (Vitamin D3) (VITAMIN D) 10,000 Unit Capsule, 57127 UNIT PO 

WEEKLY for supp, CAP


   1/4/20


Furosemide (FUROSEMIDE) 40 Mg Tablet, 40 MG PO BID for diuretic, TAB


   1/4/20


Omeprazole (OMEPRAZOLE) 40 Mg Capsule.dr, 1 CAP PO DAILY for gerd, #30 CAP 3 

Refills


   1/4/20


Tamsulosin Hcl (FLOMAX) 0.4 Mg Cap.er.24h, 1 CAP PO BID for bph, #30 CAP 11 

Refills


   1/4/20


Atorvastatin Calcium (ATORVASTATIN CALCIUM) 40 Mg Tablet, 1 TAB PO QHS for  HLD,

#90 TAB 3 Refills


   1/4/20


Thiamine Mononitrate (VITAMIN B-1) 100 Mg Tablet, 1 TAB PO DAILY for supp for 30

Days, #30 TAB 0 Refills


   1/4/20


Magnesium Oxide (MAGNESIUM) 400 Mg Capsule, 1 CAP PO DAILY for supp for 30 Days,

#30 CAP 0 Refills


   1/4/20











EDISON PRECIADO MD                 Oct 24, 2020 12:02

## 2020-10-29 NOTE — PDOC1
History and Physical


Date of Admission


Date of Admission


DATE: 10/22/20 


TIME: 3pm





Identification/Chief Complaint


Chief Complaint


shortness of breath





Source


Source:  Chart review





History of Present Illness


History of Present Illness


Mr. Barber,  is an 80-year-old gentleman with known cardiomyopathy, ejection 

fraction of 15%,  admit from ER on 10/22  with increasing shortness of


breath for days with worse LE edema, swelling,





Past Medical History


Cardiovascular:  CAD, CHF, HTN, Hyperlipidemia, Other


Pulmonary:  COPD


GI:  GERD, Other


Heme/Onc:  Cancer


Musculoskeletal:  Osteoarthritis


Endocrine:  Diabetes





Past Surgical History


Past Surgical History:  Pacemaker, Colon Resection, Other (back surgery )





Family History


Family History:  High Cholestrol, Hypertension





Social History


ALCOHOL:  none


Drugs:  None





Current Problem List


Problem List


Problems


Medical Problems:


(1) Bilateral pleural effusion


Status: Acute  





(2) CHF exacerbation


Status: Acute  





(3) Hypoxia


Status: Acute  





(4) Pulmonary infiltrates


Status: Acute  











Current Medications


Current Medications





Current Medications


Iohexol (Omnipaque 350 Mg/ml) 90 ml 1X  ONCE IV  Last administered on 10/22/20at

11:00;  Start 10/22/20 at 11:00;  Stop 10/22/20 at 11:01;  Status DC


Info (CONTRAST GIVEN -- Rx MONITORING) 1 each PRN DAILY  PRN MC SEE COMMENTS;  

Start 10/22/20 at 11:00;  Stop 10/24/20 at 10:59;  Status DC


Furosemide (Lasix) 20 mg 1X  ONCE IVP  Last administered on 10/22/20at 15:13;  

Start 10/22/20 at 13:15;  Stop 10/22/20 at 13:16;  Status DC


Ceftriaxone Sodium (Rocephin) 1 gm 1X  ONCE IVP  Last administered on 10/22/20at

15:15;  Start 10/22/20 at 15:00;  Stop 10/22/20 at 15:01;  Status DC


Azithromycin 500 mg/Sodium Chloride 250 ml @  250 mls/hr 1X  ONCE IV ;  Start 

10/22/20 at 15:00;  Stop 10/22/20 at 15:59;  Status Cancel


Azithromycin 250 ml @  250 mls/hr 1X  ONCE IV  Last administered on 10/22/20at 

15:19;  Start 10/22/20 at 15:15;  Stop 10/22/20 at 16:14;  Status DC


Furosemide (Lasix) 40 mg 1X  ONCE IVP  Last administered on 10/22/20at 18:07;  

Start 10/22/20 at 17:45;  Stop 10/22/20 at 17:46;  Status DC


Amlodipine Besylate (Norvasc) 10 mg DAILY PO  Last administered on 10/24/20at 

08:00;  Start 10/23/20 at 12:00;  Stop 10/24/20 at 14:45;  Status DC


Apixaban (Eliquis) 2.5 mg Q12HR PO  Last administered on 10/24/20at 08:00;  

Start 10/23/20 at 21:00;  Stop 10/24/20 at 14:45;  Status DC


Aspirin (Ecotrin) 81 mg DAILYWBKFT PO  Last administered on 10/24/20at 07:59;  

Start 10/23/20 at 12:00;  Stop 10/24/20 at 14:45;  Status DC


Atorvastatin Calcium (Lipitor) 40 mg QHS PO  Last administered on 10/23/20at 

21:28;  Start 10/23/20 at 21:00;  Stop 10/24/20 at 14:45;  Status DC


Carvedilol (Coreg) 12.5 mg BIDWMEALS PO  Last administered on 10/24/20at 08:00; 

Start 10/23/20 at 12:00;  Stop 10/24/20 at 14:45;  Status DC


Docusate Sodium (Colace) 100 mg PRN BID  PRN PO HARD STOOLS;  Start 10/23/20 at 

11:00;  Stop 10/24/20 at 14:45;  Status DC


Furosemide (Lasix) 40 mg BID92 PO  Last administered on 10/24/20at 08:02;  Start

10/23/20 at 14:00;  Stop 10/24/20 at 14:45;  Status DC


Albuterol/ Ipratropium (Duoneb) 3 ml RTBID NEB  Last administered on 10/24/20at 

07:46;  Start 10/23/20 at 20:00;  Stop 10/24/20 at 14:45;  Status DC


Lisinopril (Prinivil) 5 mg DAILY PO  Last administered on 10/24/20at 08:01;  

Start 10/23/20 at 12:00;  Stop 10/24/20 at 14:45;  Status DC


Potassium Chloride (Klor-Con) 20 meq DAILY PO  Last administered on 10/24/20at 

10:30;  Start 10/23/20 at 12:00;  Stop 10/24/20 at 14:45;  Status DC


Tamsulosin HCl (Flomax) 0.4 mg BID PO  Last administered on 10/24/20at 08:01;  

Start 10/23/20 at 12:00;  Stop 10/24/20 at 14:45;  Status DC


Thiamine Mononitrate (Vitamin B-1) 100 mg DAILY PO  Last administered on 

10/24/20at 08:02;  Start 10/23/20 at 12:00;  Stop 10/24/20 at 14:45;  Status DC


Ergocalciferol (Vitamin D2) 50,000 unit WEEKLY PO ;  Start 10/30/20 at 09:00;  

Stop 10/24/20 at 14:45;  Status DC


Folic Acid (Folic Acid) 1 mg DAILY PO  Last administered on 10/24/20at 08:00;  

Start 10/23/20 at 12:00;  Stop 10/24/20 at 14:45;  Status DC


Magnesium Oxide (Magnesium Oxide) 400 mg DAILY PO  Last administered on 

10/24/20at 08:01;  Start 10/23/20 at 12:00;  Stop 10/24/20 at 14:45;  Status DC


Pantoprazole Sodium (Protonix) 40 mg DAILYAC PO  Last administered on 10/24/20at

08:00;  Start 10/23/20 at 12:00;  Stop 10/24/20 at 14:45;  Status DC


Lidocaine HCl (Buffered Lidocaine 1%) 3 ml STK-MED ONCE .ROUTE ;  Start 10/23/20

at 13:14;  Stop 10/23/20 at 13:14;  Status DC


Lidocaine HCl (Buffered Lidocaine 1%) 6 ml 1X  ONCE INJ  Last administered on 

10/23/20at 13:44;  Start 10/23/20 at 13:45;  Stop 10/23/20 at 13:46;  Status DC


Tramadol HCl (Ultram) 50 mg PRN Q6HRS  PRN PO PAIN Last administered on 

10/23/20at 14:40;  Start 10/23/20 at 14:15;  Stop 10/24/20 at 14:45;  Status DC


Multivitamins (Thera M Plus) 1 tab DAILY PO  Last administered on 10/24/20at 

08:01;  Start 10/24/20 at 09:00;  Stop 10/24/20 at 14:45;  Status DC





Active Scripts


Active


Amlodipine Besylate 10 Mg Tablet 10 Mg PO DAILY 30 Days


Dok (Docusate Sodium) 100 Mg Capsule 100 Mg PO PRN BID PRN 14 Days


Klor-Con M20 (Potassium Chloride) 20 Meq Tab.er.prt 20 Meq PO DAILY 14 Days


Oysco 500+D Tablet (Calcium Carbonate/Vitamin D3) 1 Each Tablet 1 Tab PO 

BIDWMEALS 30 Days


Aspirin Ec (Aspirin) 81 Mg Tablet. 81 Mg PO DAILYWBKFT 30 Days


Carvedilol ** (Carvedilol) 12.5 Mg Tablet 12.5 Mg PO BIDWMEALS 30 Days


Duoneb 0.5-3(2.5) Mg/3 Ml (Albuterol/Ipratropium) 3 Ml Ampul.neb 3 Ml NEB RTBID 

30 Days


Reported


Lisinopril 5 Mg Tablet 1 Tab PO DAILY


Eliquis (Apixaban) 2.5 Mg Tablet 2.5 Mg PO Q12HR


Folic Acid 20 Mg Capsule 1 Mg PO DAILY


Vitamin D (Cholecalciferol (Vitamin D3)) 10,000 Unit Capsule 50,000 Unit PO 

WEEKLY


Furosemide 40 Mg Tablet 40 Mg PO BID


Omeprazole 40 Mg Capsule. 1 Cap PO DAILY


Flomax (Tamsulosin Hcl) 0.4 Mg Cap.er.24h 1 Cap PO BID


Atorvastatin Calcium 40 Mg Tablet 1 Tab PO QHS


Vitamin B-1 (Thiamine Mononitrate) 100 Mg Tablet 1 Tab PO DAILY 30 Days


Magnesium (Magnesium Oxide) 400 Mg Capsule 1 Cap PO DAILY 30 Days





Allergies


Allergies:  


Coded Allergies:  


     No Known Drug Allergies (Unverified , 5/25/18)





ROS


General:  YES: Fatigue


PSYCHOLOGICAL ROS:  No: Anxiety, Behavioral Disorder, Concentration difficultie,

Decreased libido, Depression, Disorientation, Hallucinations, Hostility, 

Irritablity, Memory difficulties, Mood Swings, Obsessive thoughts, Physical 

abuse, Sexual abuse, Sleep disturbances, Suicidal ideation, Other


Eyes:  No Blurry vision, No Decreased vision, No Double vision, No Dry eyes, No 

Excessive tearing, No Eye Pain, No Itchy Eyes, No Loss of vision, No Laura

tophobia, No Scotomata, No Uses contacts, No Uses glasses, No Other


HEENT:  No: Heacaches, Visual Changes, Hearing change, Nasal congestion, Nasal 

discharge, Oral lesions, Sinus pain, Sore Throat, Epistaxis, Sneezing, Snoring, 

Tinnitus, Vertigo, Vocal changes, Other


Respiratory:  No: Cough, Hemoptysis, Orthopnea, Pleuritic Pain, Shortness of 

breath, SOB with excertion, Sputum Changes, Stridor, Tachypnea, Wheezing, Other


Cardiovascular:  No Chest Pain, No Palpitations, No Orthopnea, No Paroxysmal 

Noc. Dyspnea, No Edema, No Lt Headedness, No Other


Gastrointestinal:  No Nausea, No Vomiting, No Abdominal Pain, No Diarrhea, No 

Constipation, No Melena, No Hematochezia, No Other


Genitourinary:  No Dysuria, No Frequency, No Incontinence, No Hematuria, No 

Retention, No Discharge, No Urgency, No Pain, No Flank Pain, No Other, No , No ,

No , No , No , No , No 


Musculoskeletal:  Yes Joint Stiffness, Yes Joint Swelling, Yes Pain In: (legs); 


   No Gait Disturbance, No Joint Pain, No Muscle Pain, No Muscular Weakness, No 

Swelling In:, No Other


Neurological:  Yes Impaired Coord/balance, Yes Weakness; 


   No Behavorial Changes, No Bowel/Bladder ControlChng, No Confusion, No 

Dizziness, No Gait Disturbance, No Memory Loss, No Numbness/Tingling, No 

Seizures, No Other


Skin:  Yes Dry Skin





Physical Exam


General:  Cooperative, mild distress


HEENT:  EOMI


Heart:  no gallops, other (edema, 3_+)


Rectal Exam:  deferred


Extremities:  No cyanosis, Other


Neuro:  Normal tone, Sensation intact


Psych/Mental Status:  Mood NL





Vitals


Vitals





Vital Signs








  Date Time  Temp Pulse Resp B/P (MAP) Pulse Ox O2 Delivery O2 Flow Rate FiO2


 


10/24/20 11:13 97.9 60 18 124/68 (86) 94 Nasal Cannula 2.0 





 97.9       











VTE Prophylaxis Ordered


VTE Prophylaxis Devices:  Contraindicated


VTE Pharmacological Prophylaxi:  Yes





Assessment/Plan


Assessment/Plan


1.  Acute on chronic respiratory failure with a/c CHF and recurrent pleural 

effusion. 


2.  Acute on chronic systolic CHF. 


3.  Mixed ischemic/ non-ischemic cardiomyopathy; Echo 1/20 showed LVEF 15% . 

Cath 6/2019 with moderate, diffuse CAD as noted below.. Followed with [facility], but 

does not appear that he has been seen since 6/2019


4.  CAD s/p PCI/stent to the RCA 2009; CP free. 


5.  SSS s/p PPM ([employer]); no recent device download noted 


6.  Accelerated hypertension  


7.  PAFIB; presently SR


8.  Hyperlipidemia:  Continue statin therapy


9.  Elevated ddimer; CT chest without PE





Justifications for Admission


Other Justification














EDISON PRECIADO MD                 Oct 29, 2020 13:00

## 2021-02-18 ENCOUNTER — HOSPITAL ENCOUNTER (INPATIENT)
Dept: HOSPITAL 61 - ER | Age: 81
LOS: 4 days | Discharge: HOME HEALTH | DRG: 308 | End: 2021-02-22
Attending: INTERNAL MEDICINE | Admitting: INTERNAL MEDICINE
Payer: MEDICARE

## 2021-02-18 VITALS — WEIGHT: 132.72 LBS | HEIGHT: 71 IN | BODY MASS INDEX: 18.58 KG/M2

## 2021-02-18 VITALS — SYSTOLIC BLOOD PRESSURE: 140 MMHG | DIASTOLIC BLOOD PRESSURE: 85 MMHG

## 2021-02-18 VITALS — SYSTOLIC BLOOD PRESSURE: 121 MMHG | DIASTOLIC BLOOD PRESSURE: 90 MMHG

## 2021-02-18 DIAGNOSIS — I25.5: ICD-10-CM

## 2021-02-18 DIAGNOSIS — F17.210: ICD-10-CM

## 2021-02-18 DIAGNOSIS — Z95.0: ICD-10-CM

## 2021-02-18 DIAGNOSIS — I50.43: ICD-10-CM

## 2021-02-18 DIAGNOSIS — Z20.822: ICD-10-CM

## 2021-02-18 DIAGNOSIS — I25.10: ICD-10-CM

## 2021-02-18 DIAGNOSIS — Z79.01: ICD-10-CM

## 2021-02-18 DIAGNOSIS — Z82.49: ICD-10-CM

## 2021-02-18 DIAGNOSIS — E78.5: ICD-10-CM

## 2021-02-18 DIAGNOSIS — J44.9: ICD-10-CM

## 2021-02-18 DIAGNOSIS — Z95.5: ICD-10-CM

## 2021-02-18 DIAGNOSIS — M19.90: ICD-10-CM

## 2021-02-18 DIAGNOSIS — K21.9: ICD-10-CM

## 2021-02-18 DIAGNOSIS — I48.0: Primary | ICD-10-CM

## 2021-02-18 DIAGNOSIS — I11.0: ICD-10-CM

## 2021-02-18 LAB
% LYMPHS: 5 % (ref 24–48)
% MONOS: 4 % (ref 0–10)
% SEGS: 90 % (ref 35–66)
ALBUMIN SERPL-MCNC: 3 G/DL (ref 3.4–5)
ALBUMIN/GLOB SERPL: 0.7 {RATIO} (ref 1–1.7)
ALP SERPL-CCNC: 117 U/L (ref 46–116)
ALT SERPL-CCNC: 24 U/L (ref 16–63)
ANION GAP SERPL CALC-SCNC: 12 MMOL/L (ref 6–14)
ANISOCYTOSIS BLD QL SMEAR: (no result)
AST SERPL-CCNC: 22 U/L (ref 15–37)
BASOPHILS # BLD AUTO: 0 X10^3/UL (ref 0–0.2)
BASOPHILS NFR BLD AUTO: 1 % (ref 0–3)
BASOPHILS NFR BLD: 0 % (ref 0–3)
BILIRUB SERPL-MCNC: 0.5 MG/DL (ref 0.2–1)
BUN SERPL-MCNC: 21 MG/DL (ref 8–26)
BUN/CREAT SERPL: 19 (ref 6–20)
CALCIUM SERPL-MCNC: 7.8 MG/DL (ref 8.5–10.1)
CHLORIDE SERPL-SCNC: 108 MMOL/L (ref 98–107)
CO2 SERPL-SCNC: 26 MMOL/L (ref 21–32)
CREAT SERPL-MCNC: 1.1 MG/DL (ref 0.7–1.3)
EOSINOPHIL NFR BLD: 0 % (ref 0–3)
EOSINOPHIL NFR BLD: 0 X10^3/UL (ref 0–0.7)
ERYTHROCYTE [DISTWIDTH] IN BLOOD BY AUTOMATED COUNT: 23.7 % (ref 11.5–14.5)
GFR SERPLBLD BASED ON 1.73 SQ M-ARVRAT: 77.9 ML/MIN
GLUCOSE SERPL-MCNC: 127 MG/DL (ref 70–99)
HCT VFR BLD CALC: 36.2 % (ref 39–53)
HGB BLD-MCNC: 11.2 G/DL (ref 13–17.5)
HYPOCHROMIA BLD QL SMEAR: SLIGHT
INFLUENZA A PATIENT: NEGATIVE
INFLUENZA B PATIENT: NEGATIVE
LYMPHOCYTES # BLD: 0.3 X10^3/UL (ref 1–4.8)
LYMPHOCYTES NFR BLD AUTO: 5 % (ref 24–48)
MCH RBC QN AUTO: 25 PG (ref 25–35)
MCHC RBC AUTO-ENTMCNC: 31 G/DL (ref 31–37)
MCV RBC AUTO: 79 FL (ref 79–100)
MONO #: 0.7 X10^3/UL (ref 0–1.1)
MONOCYTES NFR BLD: 9 % (ref 0–9)
NEUT #: 6.6 X10^3/UL (ref 1.8–7.7)
NEUTROPHILS NFR BLD AUTO: 86 % (ref 31–73)
NRBC # BLD MANUAL: 1 10*3/UL
PLATELET # BLD AUTO: 521 X10^3/UL (ref 140–400)
PLATELET # BLD EST: (no result) 10*3/UL
POIKILOCYTOSIS BLD QL SMEAR: SLIGHT
POTASSIUM SERPL-SCNC: 4.1 MMOL/L (ref 3.5–5.1)
PROT SERPL-MCNC: 7.6 G/DL (ref 6.4–8.2)
RBC # BLD AUTO: 4.57 X10^6/UL (ref 4.3–5.7)
SODIUM SERPL-SCNC: 146 MMOL/L (ref 136–145)
WBC # BLD AUTO: 7.6 X10^3/UL (ref 4–11)

## 2021-02-18 PROCEDURE — 84484 ASSAY OF TROPONIN QUANT: CPT

## 2021-02-18 PROCEDURE — 80053 COMPREHEN METABOLIC PANEL: CPT

## 2021-02-18 PROCEDURE — 85007 BL SMEAR W/DIFF WBC COUNT: CPT

## 2021-02-18 PROCEDURE — 83036 HEMOGLOBIN GLYCOSYLATED A1C: CPT

## 2021-02-18 PROCEDURE — 80048 BASIC METABOLIC PNL TOTAL CA: CPT

## 2021-02-18 PROCEDURE — U0003 INFECTIOUS AGENT DETECTION BY NUCLEIC ACID (DNA OR RNA); SEVERE ACUTE RESPIRATORY SYNDROME CORONAVIRUS 2 (SARS-COV-2) (CORONAVIRUS DISEASE [COVID-19]), AMPLIFIED PROBE TECHNIQUE, MAKING USE OF HIGH THROUGHPUT TECHNOLOGIES AS DESCRIBED BY CMS-2020-01-R: HCPCS

## 2021-02-18 PROCEDURE — 99285 EMERGENCY DEPT VISIT HI MDM: CPT

## 2021-02-18 PROCEDURE — 96365 THER/PROPH/DIAG IV INF INIT: CPT

## 2021-02-18 PROCEDURE — 87804 INFLUENZA ASSAY W/OPTIC: CPT

## 2021-02-18 PROCEDURE — 36415 COLL VENOUS BLD VENIPUNCTURE: CPT

## 2021-02-18 PROCEDURE — 96375 TX/PRO/DX INJ NEW DRUG ADDON: CPT

## 2021-02-18 PROCEDURE — 71045 X-RAY EXAM CHEST 1 VIEW: CPT

## 2021-02-18 PROCEDURE — 85025 COMPLETE CBC W/AUTO DIFF WBC: CPT

## 2021-02-18 PROCEDURE — 83880 ASSAY OF NATRIURETIC PEPTIDE: CPT

## 2021-02-18 PROCEDURE — G0378 HOSPITAL OBSERVATION PER HR: HCPCS

## 2021-02-18 PROCEDURE — 94640 AIRWAY INHALATION TREATMENT: CPT

## 2021-02-18 PROCEDURE — 93005 ELECTROCARDIOGRAM TRACING: CPT

## 2021-02-18 PROCEDURE — 84443 ASSAY THYROID STIM HORMONE: CPT

## 2021-02-18 RX ADMIN — PSYLLIUM HUSK SCH PKT: 3.4 POWDER ORAL at 21:00

## 2021-02-18 NOTE — PDOC1
History and Physical


Date of Admission


Date of Admission


DATE: 2/18/21 


TIME: 13:07





Identification/Chief Complaint


Chief Complaint


Shortness of breath





Source


Source:  Patient





History of Present Illness


History of Present Illness


Mr Barber is an 80-year-old male w/ PMHx SSS s/p PPM, HTN, diastolic CHF, 

smoker presents to the ED c/o progressive shortness of air that has progressed 

over the past month. He was going to a cardiology appointment prior to this, but

states he forgot his paperwork and came to ED as his shortness of breath was 

unbearable. Associated PND, orthopnea and LE edema. He notes decreased UOP and 

has been compliant with meds. No new cough or sick exposures.  He further denies

any nausea, diaphoresis or feeling of being lightheaded. 





Chest radiograph concerning for pulmonary edema with large right pleural 

effusion with lateral wall loculations previously noted


EKG was irregularly irregular rhythm no significant P waves found. BNP 14912


Started on IV lasix and diltiazem infusion for afib with RVR and admitted for 

further care





Past Medical History


Cardiovascular:  AFIB, CAD, CHF, HTN, Other


Pulmonary:  COPD


CENTRAL NERVOUS SYSTEM:  Other


GI:  GERD


Heme/Onc:  Anemia NOS


Hepatobiliary:  No pertinent hx


Psych:  No pertinent hx


Musculoskeletal:  Osteoarthritis


Rheumatologic:  No pertinent hx


Infectious disease:  No pertinent hx


Renal/:  Benign prostatic enlarg.


Endocrine:  No pertinent hx





Past Surgical History


Past Surgical History:  Pacemaker, Colon Resection, Other





Family History


Family History:  Hypertension





Social History


Smoke:  <1 pack per day


ALCOHOL:  none


Drugs:  None





Current Problem List


Problem List


Problems


Medical Problems:


(1) CHF (congestive heart failure)


Status: Acute  











Current Medications


Current Medications





Current Medications


Albuterol/ Ipratropium (Duoneb) 3 ml 1X  ONCE NEB  Last administered on 

2/18/21at 10:45;  Start 2/18/21 at 10:45;  Stop 2/18/21 at 10:46;  Status DC


Methylprednisolone Sodium Succinate (SOLU-Medrol 125MG VIAL) 125 mg 1X  ONCE IV 

Last administered on 2/18/21at 11:02;  Start 2/18/21 at 10:45;  Stop 2/18/21 at 

10:46;  Status DC


Diltiazem HCl (Cardizem Iv Push) 20 mg 1X  ONCE IVP  Last administered on 

2/18/21at 12:02;  Start 2/18/21 at 12:00;  Stop 2/18/21 at 12:01;  Status DC


Diltiazem HCl 125 mg/Sodium Chloride 125 ml @ 5 mls/hr CONT  PRN IV SEE I/O 

RECORD Last administered on 2/18/21at 12:29;  Start 2/18/21 at 12:15





Active Scripts


Active


Coreg (Carvedilol) 25 Mg Tablet 12.5 Mg PO BIDWMEALS 30 Days


Lasix (Furosemide) 40 Mg Tablet 1 Tab PO DAILY 30 Days


Entresto 24 mg-26 mg Tablet (Sacubitril/Valsartan) 1 Each Tablet 1 Tab PO BID 30

Days


Amlodipine Besylate 5 Mg Tablet 5 Mg PO DAILY 30 Days


Culturelle (Lactobacillus Rhamnosus Gg) 1 Each Cap.sprink 1 Cap PO BID 30 Days


Oysco 500+D Tablet (Calcium Carbonate/Vitamin D3) 1 Each Tablet 2 Tab PO BI

DWMEALS 30 Days


Aldactone (Spironolactone) 25 Mg Tablet 25 Mg PO DAILY 30 Days


Dok (Docusate Sodium) 100 Mg Capsule 100 Mg PO PRN BID PRN 14 Days


Aspirin Ec (Aspirin) 81 Mg Tablet.dr 81 Mg PO DAILYWBKFT 30 Days


Reported


Eliquis (Apixaban) 2.5 Mg Tablet 2.5 Mg PO BID


Flomax (Tamsulosin Hcl) 0.4 Mg Cap.er.24h 1 Cap PO BID


Atorvastatin Calcium 40 Mg Tablet 1 Tab PO QHS


Magnesium (Magnesium Oxide) 400 Mg Capsule 1 Cap PO DAILY 30 Days





Allergies


Allergies:  


Coded Allergies:  


     No Known Drug Allergies (Unverified , 5/25/18)





ROS


General:  YES: Fatigue, Malaise; 


   No: Chills, Night Sweats, Appetite, Other


PSYCHOLOGICAL ROS:  No: Anxiety, Behavioral Disorder, Concentration difficultie,

Decreased libido, Depression, Disorientation, Hallucinations, Hostility, 

Irritablity, Memory difficulties, Mood Swings, Obsessive thoughts, Physical 

abuse, Sexual abuse, Sleep disturbances, Suicidal ideation, Other


Eyes:  No Blurry vision, No Decreased vision, No Double vision, No Dry eyes, No 

Excessive tearing, No Eye Pain, No Itchy Eyes, No Loss of vision, No 

Photophobia, No Scotomata, No Uses contacts, No Uses glasses, No Other


HEENT:  No: Heacaches, Visual Changes, Hearing change, Nasal congestion, Nasal 

discharge, Oral lesions, Sinus pain, Sore Throat, Epistaxis, Sneezing, Snoring, 

Tinnitus, Vertigo, Vocal changes, Other


ALLERGY AND IMMUNOLOGY:  No: Hives, Insect Bite Sensitivity, Itchy/Watery Eyes, 

Nasal Congestion, Post Nasal Drip, Seasonal Allergies, Other


Hematological and Lymphatic:  No: Bleeding Problems, Blood Clots, Blood 

Transfusions, Brusing, Night Sweats, Pallor, Swollen Lymph Nodes, Other


ENDOCRINE:  No: Breast Changes, Galactorrhea, Hair Pattern Changes, Hot Flashes,

Malaise/lethargy, Mood Swings, Palpitations, Polydipsia/polyuria, Skin Changes, 

Temperature Intolerance, Unexpected Weight Changes, Other


Breast:  No New/Changing Breast Lumps, No Nipple changes, No Nipple discharge, 

No Other


Respiratory:  YES: Orthopnea, Shortness of breath, SOB with excertion, Tachypnea

; 


   No: Cough, Hemoptysis, Pleuritic Pain, Sputum Changes, Stridor, Wheezing, 

Other


Cardiovascular:  yes Orthopnea, yes Paroxysmal Noc. Dyspnea, yes Edema; 


   No Chest Pain, No Palpitations, No Lt Headedness, No Other


Gastrointestinal:  No Nausea, No Vomiting, No Abdominal Pain, No Diarrhea, No 

Constipation, No Melena, No Hematochezia, No Other


Genitourinary:  No Dysuria, No Frequency, No Incontinence, No Hematuria, No 

Retention, No Discharge, No Urgency, No Pain, No Flank Pain, No Other, No , No ,

No , No , No , No , No 


Musculoskeletal:  No Gait Disturbance, No Joint Pain, No Joint Stiffness, No 

Joint Swelling, No Muscle Pain, No Muscular Weakness, No Pain In:, No Swelling 

In:, No Other


Neurological:  No Behavorial Changes, No Bowel/Bladder ControlChng, No 

Confusion, No Dizziness, No Gait Disturbance, No Headaches, No Impaired 

Coord/balance, No Memory Loss, No Numbness/Tingling, No Seizures, No Speech 

Problems, No Tremors, No Visual Changes, No Weakness, No Other


Skin:  No Dry Skin, No Eczema, No Hair Changes, No Lumps, No Mole Changes, No 

Mottling, No Nail Changes, No Pruritus, No Rash, No Skin Lesion Changes, No 

Other, No Acne





Physical Exam


General:  Alert, Oriented X3, Cooperative, moderate distress


HEENT:  Atraumatic, PERRLA, EOMI, Mucous membr. moist/pink


Lungs:  Other (Decreased right sided breath sounds, bibasilar crackles)


Heart:  irregularly irregular


Abdomen:  Normal bowel sounds, Soft, No tenderness, No hepatosplenomegaly, No 

masses


Rectal Exam:  not examined


Extremities:  No clubbing, No cyanosis, No edema, Normal pulses, No 

tenderness/swelling


Skin:  No rashes, No breakdown, No significant lesion


Neuro:  Normal gait, Normal speech, Strength at 5/5 X4 ext, Normal tone, 

Sensation intact, Cranial nerves 3-12 NL, Reflexes 2+


Psych/Mental Status:  Mental status NL, Mood NL





Vitals


Vitals





Vital Signs








  Date Time  Temp Pulse Resp B/P (MAP) Pulse Ox O2 Delivery O2 Flow Rate FiO2


 


2/18/21 12:03  113  160/96 (117) 93 Nasal Cannula 2.0 


 


2/18/21 10:27 97.9  24     





 97.9       











Labs


Labs





Laboratory Tests








Test


 2/18/21


10:57 2/18/21


11:48


 


Influenza Type A Antigen


 Negative


(NEGATIVE) 





 


Influenza Type B Antigen


 Negative


(NEGATIVE) 





 


White Blood Count


 


 7.6 x10^3/uL


(4.0-11.0)


 


Red Blood Count


 


 4.57 x10^6/uL


(4.30-5.70)


 


Hemoglobin


 


 11.2 g/dL


(13.0-17.5)


 


Hematocrit


 


 36.2 %


(39.0-53.0)


 


Mean Corpuscular Volume  79 fL () 


 


Mean Corpuscular Hemoglobin  25 pg (25-35) 


 


Mean Corpuscular Hemoglobin


Concent 


 31 g/dL


(31-37)


 


Red Cell Distribution Width


 


 23.7 %


(11.5-14.5)


 


Platelet Count


 


 521 x10^3/uL


(140-400)


 


Neutrophils (%) (Auto)  86 % (31-73) 


 


Lymphocytes (%) (Auto)  5 % (24-48) 


 


Monocytes (%) (Auto)  9 % (0-9) 


 


Eosinophils (%) (Auto)  0 % (0-3) 


 


Basophils (%) (Auto)  0 % (0-3) 


 


Neutrophils # (Auto)


 


 6.6 x10^3/uL


(1.8-7.7)


 


Lymphocytes # (Auto)


 


 0.3 x10^3/uL


(1.0-4.8)


 


Monocytes # (Auto)


 


 0.7 x10^3/uL


(0.0-1.1)


 


Eosinophils # (Auto)


 


 0.0 x10^3/uL


(0.0-0.7)


 


Basophils # (Auto)


 


 0.0 x10^3/uL


(0.0-0.2)


 


Sodium Level


 


 146 mmol/L


(136-145)


 


Potassium Level


 


 4.1 mmol/L


(3.5-5.1)


 


Chloride Level


 


 108 mmol/L


()


 


Carbon Dioxide Level


 


 26 mmol/L


(21-32)


 


Anion Gap  12 (6-14) 


 


Blood Urea Nitrogen


 


 21 mg/dL


(8-26)


 


Creatinine


 


 1.1 mg/dL


(0.7-1.3)


 


Estimated GFR


(Cockcroft-Gault) 


 77.9 





 


BUN/Creatinine Ratio  19 (6-20) 


 


Glucose Level


 


 127 mg/dL


(70-99)


 


Calcium Level


 


 7.8 mg/dL


(8.5-10.1)


 


Total Bilirubin


 


 0.5 mg/dL


(0.2-1.0)


 


Aspartate Amino Transf


(AST/SGOT) 


 22 U/L (15-37) 





 


Alanine Aminotransferase


(ALT/SGPT) 


 24 U/L (16-63) 





 


Alkaline Phosphatase


 


 117 U/L


()


 


Troponin I Quantitative


 


 0.024 ng/mL


(0.000-0.055)


 


Total Protein


 


 7.6 g/dL


(6.4-8.2)


 


Albumin


 


 3.0 g/dL


(3.4-5.0)


 


Albumin/Globulin Ratio  0.7 (1.0-1.7) 








Laboratory Tests








Test


 2/18/21


10:57 2/18/21


11:48


 


Influenza Type A Antigen


 Negative


(NEGATIVE) 





 


Influenza Type B Antigen


 Negative


(NEGATIVE) 





 


White Blood Count


 


 7.6 x10^3/uL


(4.0-11.0)


 


Red Blood Count


 


 4.57 x10^6/uL


(4.30-5.70)


 


Hemoglobin


 


 11.2 g/dL


(13.0-17.5)


 


Hematocrit


 


 36.2 %


(39.0-53.0)


 


Mean Corpuscular Volume  79 fL () 


 


Mean Corpuscular Hemoglobin  25 pg (25-35) 


 


Mean Corpuscular Hemoglobin


Concent 


 31 g/dL


(31-37)


 


Red Cell Distribution Width


 


 23.7 %


(11.5-14.5)


 


Platelet Count


 


 521 x10^3/uL


(140-400)


 


Neutrophils (%) (Auto)  86 % (31-73) 


 


Lymphocytes (%) (Auto)  5 % (24-48) 


 


Monocytes (%) (Auto)  9 % (0-9) 


 


Eosinophils (%) (Auto)  0 % (0-3) 


 


Basophils (%) (Auto)  0 % (0-3) 


 


Neutrophils # (Auto)


 


 6.6 x10^3/uL


(1.8-7.7)


 


Lymphocytes # (Auto)


 


 0.3 x10^3/uL


(1.0-4.8)


 


Monocytes # (Auto)


 


 0.7 x10^3/uL


(0.0-1.1)


 


Eosinophils # (Auto)


 


 0.0 x10^3/uL


(0.0-0.7)


 


Basophils # (Auto)


 


 0.0 x10^3/uL


(0.0-0.2)


 


Sodium Level


 


 146 mmol/L


(136-145)


 


Potassium Level


 


 4.1 mmol/L


(3.5-5.1)


 


Chloride Level


 


 108 mmol/L


()


 


Carbon Dioxide Level


 


 26 mmol/L


(21-32)


 


Anion Gap  12 (6-14) 


 


Blood Urea Nitrogen


 


 21 mg/dL


(8-26)


 


Creatinine


 


 1.1 mg/dL


(0.7-1.3)


 


Estimated GFR


(Cockcroft-Gault) 


 77.9 





 


BUN/Creatinine Ratio  19 (6-20) 


 


Glucose Level


 


 127 mg/dL


(70-99)


 


Calcium Level


 


 7.8 mg/dL


(8.5-10.1)


 


Total Bilirubin


 


 0.5 mg/dL


(0.2-1.0)


 


Aspartate Amino Transf


(AST/SGOT) 


 22 U/L (15-37) 





 


Alanine Aminotransferase


(ALT/SGPT) 


 24 U/L (16-63) 





 


Alkaline Phosphatase


 


 117 U/L


()


 


Troponin I Quantitative


 


 0.024 ng/mL


(0.000-0.055)


 


Total Protein


 


 7.6 g/dL


(6.4-8.2)


 


Albumin


 


 3.0 g/dL


(3.4-5.0)


 


Albumin/Globulin Ratio  0.7 (1.0-1.7) 











Images


Images


Chest radiograph:


Large right pleural effusion is redemonstrated and appears grossly stable. 

Previously seen small left pleural effusion appears improved. There are 

extensive perihilar airspace infiltrates bilaterally with associated 

cephalization, likely representing pulmonary edema. Heart size is stable. Dual-

lead pacemaker is present. No pneumothorax.





IMPRESSION:


1. Worsening perihilar infiltrates concerning for pulmonary edema. There is 

stable large right pleural effusion with lateral wall loculations.





VTE Prophylaxis Ordered


VTE Prophylaxis Devices:  Yes


VTE Pharmacological Prophylaxi:  Yes





Assessment/Plan


Assessment/Plan


A/P:


Acute on chronic diastolic systolic CHF with EF 15% - IV lasix, looks in florid 

CHF. Cardiology consulted


Ischemic cardiomyopathy


CAD s/p PCI/stent to the RCA 2009 - coronary angiogram 6/2019 [facility] with moderate 

diffuse CAD


SSS s/p PPM ([employer]) - needs interrogation


HTN - cont home meds


PAFIB - afib with RVR currently. Is on eliquis


Hyperlipidemia - cont statin





FEN - Cardiac diet


PPX - eliquis


FULL CODE


Dispo - inpatient





Justifications for Admission


Other Justification














KAYLI GOMES MD        Feb 18, 2021 13:07

## 2021-02-18 NOTE — ED.ADGEN
Past Medical History


Past Medical History:  CHF, Heart Disease, Hypertension, Other


Additional Past Medical Histor:  BRADYCARDIA


Past Surgical History:  Pacemaker, Other


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


Smoking Status:  Current Every Day Smoker


Alcohol Use:  None


Drug Use:  None





General Adult


EDM:


Chief Complaint:  SHORTNESS OF BREATH





HPI:


HPI:





Patient is a 80-year-old male who arrives ambulatory to the emergency department

complaining of progressive shortness of air.  Patient reports he has been short 

of air for about a month.  Patient was scheduled to visit with his cardiologist 

today however the patient did not possess the appropriate paperwork for that 

visit and as result, was unable to make his appointment.  Patient states he is 

seek evaluation in the emergency department because of his difficulty breathing.

 Despite being short of air, the patient denies any history of fever or pain.  

Additionally he denies any new cough or exposures.  He further denies any 

nausea, diaphoresis or feeling of being lightheaded.  He is awake, alert and 

nontoxic-appearing.





Review of Systems:


Review of Systems:


Constitutional:   Denies fever or chills. []


Eyes:   Denies change in visual acuity. []


HENT:   Denies nasal congestion or sore throat. [] 


Respiratory:   Reports to being short of air.  Denies cough.  [] 


Cardiovascular:   Denies chest pain or edema. [] 


GI:   Denies abdominal pain, nausea, vomiting, bloody stools or diarrhea. [] 


:  Denies dysuria. [] 


Musculoskeletal:   Denies back pain or joint pain. [] 


Integument:   Denies rash. [] 


Neurologic:   Denies headache, focal weakness or sensory changes. [] 


Endocrine:   Denies polyuria or polydipsia. [] 


Lymphatic:  Denies swollen glands. [] 


Psychiatric:  Denies depression or anxiety. []





Current Medications:





Current Medications








 Medications


  (Trade)  Dose


 Ordered  Sig/Reyna  Start Time


 Stop Time Status Last Admin


Dose Admin


 


 Albuterol/


 Ipratropium


  (Duoneb)  3 ml  1X  ONCE  21 10:45


 21 10:46 DC 21 10:45


3 ML


 


 Diltiazem HCl


  (Cardizem Iv


 Push)  20 mg  1X  ONCE  21 12:00


 21 12:01 DC 21 12:02


20 MG


 


 Diltiazem HCl 125


 mg/Sodium Chloride  125 ml @ 5


 mls/hr  CONT  PRN  21 12:15


    21 12:29


5 MLS/HR


 


 Furosemide


  (Lasix)  40 mg  1X  ONCE  21 13:15


 21 13:16 UNV  





 


 Methylprednisolone


 Sodium Succinate


  (SOLU-Medrol


 125MG VIAL)  125 mg  1X  ONCE  21 10:45


 21 10:46 DC 21 11:02


125 MG











Allergies:


Allergies:





Allergies








Coded Allergies Type Severity Reaction Last Updated Verified


 


  No Known Drug Allergies    18 No











Physical Exam:


PE:





Constitutional: Well developed, well nourished, no acute distress, non-toxic 

appearance. []


HENT: Normocephalic, atraumatic, bilateral external ears normal, oropharynx 

moist, no oral exudates, nose normal. []


Eyes: PERRLA, EOMI, conjunctiva normal, no discharge. [] 


Neck: Normal range of motion, no tenderness, supple, no stridor. [] 


Cardiovascular: Irregular rhythm with pacemaker at left upper chest wall.  no 

rub or murmur []


Lungs & Thorax:  Bilateral breath sounds are diminished.  


Abdomen: Bowel sounds normal, soft, no tenderness, no masses, no pulsatile 

masses. [] 


Skin: Warm, dry, no erythema, no rash. [] 


Back: No tenderness, no CVA tenderness. [] 


Extremities: No tenderness, no cyanosis, no clubbing, ROM intact, no edema. [] 


Neurologic: Alert and oriented X 3, normal motor function, normal sensory 

function, no focal deficits noted. []


Psychologic: Affect normal, judgement normal, mood normal. []





Current Patient Data:


Labs:





                                Laboratory Tests








Test


 21


10:57 21


11:48


 


Influenza Type A Antigen


 Negative


(NEGATIVE) 





 


Influenza Type B Antigen


 Negative


(NEGATIVE) 





 


White Blood Count


 


 7.6 x10^3/uL


(4.0-11.0)


 


Red Blood Count


 


 4.57 x10^6/uL


(4.30-5.70)


 


Hemoglobin


 


 11.2 g/dL


(13.0-17.5)  L


 


Hematocrit


 


 36.2 %


(39.0-53.0)  L


 


Mean Corpuscular Volume


 


 79 fL ()





 


Mean Corpuscular Hemoglobin  25 pg (25-35)  


 


Mean Corpuscular Hemoglobin


Concent 


 31 g/dL


(31-37)


 


Red Cell Distribution Width


 


 23.7 %


(11.5-14.5)  H


 


Platelet Count


 


 521 x10^3/uL


(140-400)  H


 


Neutrophils (%) (Auto)  86 % (31-73)  H


 


Lymphocytes (%) (Auto)  5 % (24-48)  L


 


Monocytes (%) (Auto)  9 % (0-9)  


 


Eosinophils (%) (Auto)  0 % (0-3)  


 


Basophils (%) (Auto)  0 % (0-3)  


 


Neutrophils # (Auto)


 


 6.6 x10^3/uL


(1.8-7.7)


 


Lymphocytes # (Auto)


 


 0.3 x10^3/uL


(1.0-4.8)  L


 


Monocytes # (Auto)


 


 0.7 x10^3/uL


(0.0-1.1)


 


Eosinophils # (Auto)


 


 0.0 x10^3/uL


(0.0-0.7)


 


Basophils # (Auto)


 


 0.0 x10^3/uL


(0.0-0.2)


 


Platelet Estimate  Pending  


 


Sodium Level


 


 146 mmol/L


(136-145)  H


 


Potassium Level


 


 4.1 mmol/L


(3.5-5.1)


 


Chloride Level


 


 108 mmol/L


()  H


 


Carbon Dioxide Level


 


 26 mmol/L


(21-32)


 


Anion Gap  12 (6-14)  


 


Blood Urea Nitrogen


 


 21 mg/dL


(8-26)


 


Creatinine


 


 1.1 mg/dL


(0.7-1.3)


 


Estimated GFR


(Cockcroft-Gault) 


 77.9  





 


BUN/Creatinine Ratio  19 (6-20)  


 


Glucose Level


 


 127 mg/dL


(70-99)  H


 


Calcium Level


 


 7.8 mg/dL


(8.5-10.1)  L


 


Total Bilirubin


 


 0.5 mg/dL


(0.2-1.0)


 


Aspartate Amino Transferase


(AST) 


 22 U/L (15-37)





 


Alanine Aminotransferase (ALT)


 


 24 U/L (16-63)





 


Alkaline Phosphatase


 


 117 U/L


()  H


 


Troponin I Quantitative


 


 0.024 ng/mL


(0.000-0.055)


 


Total Protein


 


 7.6 g/dL


(6.4-8.2)


 


Albumin


 


 3.0 g/dL


(3.4-5.0)  L


 


Albumin/Globulin Ratio


 


 0.7 (1.0-1.7)


L





                                Laboratory Tests


21 11:48








                                Laboratory Tests


21 11:48








Vital Signs:





                                   Vital Signs








  Date Time  Temp Pulse Resp B/P (MAP) Pulse Ox O2 Delivery O2 Flow Rate FiO2


 


21 12:03  113  160/96 (117) 93 Nasal Cannula 2.0 


 


21 10:27 97.9  24     





 97.9       











EKG:


EKG:


EKG was obtained at 10:42 AM and revealed an irregular rhythm with a ventricular

 rate of 92 bpm.  There are PVCs present with evidence of left ventricular 

hypertrophy as well as what appears to be a prolonged QT interval.  There are no

 acute ST/T wave changes to denote ischemia or acute cardiac injury.





Heart Score:


Risk Factors:


Risk Factors:  DM, Current or recent (<one month) smoker, HTN, HLP, family 

history of CAD, obesity.


Risk Scores:


Score 0 - 3:  2.5% MACE over next 6 weeks - Discharge Home


Score 4 - 6:  20.3% MACE over next 6 weeks - Admit for Clinical Observation


Score 7 - 10:  72.7% MACE over next 6 weeks - Early Invasive Strategies





Radiology/Procedures:


Radiology/Procedures:


[]


Impression:


[facility]


                    [address]  [place], [place] [id number] [phone]


                                        


                                 IMAGING REPORT





                                     Signed





PATIENT: OANH GILBERT: [id number]     MRN#: [id number]


: 1940           LOCATION: ER              AGE: 80


SEX: M                    EXAM DT: 21         ACCESSION#: [id number]


STATUS: REG ER            ORD. PHYSICIAN: MALCOLM SWEET DO


REASON: Shortness of air,


PROCEDURE: PORTABLE CHEST 1V





Frontal chest radiograph compared to similar exam dated 2020 for 

shortness of air.





FINDINGS: Large right pleural effusion is redemonstrated and appears grossly 

stable. Previously seen small left pleural effusion appears improved. There are 

extensive perihilar airspace infiltrates bilaterally with associated 

cephalization, likely representing pulmonary edema. Heart size is stable. Dual-

lead pacemaker is present. No pneumothorax.





IMPRESSION:


1. Worsening perihilar infiltrates concerning for pulmonary edema. There is 

stable large right pleural effusion with lateral wall loculations.





Electronically signed by: Phoenix Mari MD (2021 12:04 PM) [id number]














DICTATED and SIGNED BY:     PHOENIX MARI MD


DATE:     21 [id number]








Course & Med Decision Making:


Course & Med Decision Making


Pertinent Labs and Imaging studies reviewed. (See chart for details)





[]





Dragon Disclaimer:


Dragon Disclaimer:


This electronic medical record was generated, in whole or in part, using a voice

 recognition dictation system.





Departure


Departure


Impression:  


   Primary Impression:  


   CHF (congestive heart failure)


   Additional Impressions:  


   Pleural effusion


   Atrial fibrillation with rapid ventricular response


Disposition:   ADMITTED AS INPT THIS HOSP


Admitting Physician:  HIMS


Condition:  GOOD


Referrals:  


MARLEN RIZZO MD (PCP)





Problem Qualifiers











MALCOLM SWEET DO               2021 10:58

## 2021-02-18 NOTE — RAD
Frontal chest radiograph compared to similar exam dated December 28, 2020 for shortness of air.



FINDINGS: Large right pleural effusion is redemonstrated and appears grossly stable. Previously seen 
small left pleural effusion appears improved. There are extensive perihilar airspace infiltrates bila
terally with associated cephalization, likely representing pulmonary edema. Heart size is stable. Simone
l-lead pacemaker is present. No pneumothorax.



IMPRESSION:

1. Worsening perihilar infiltrates concerning for pulmonary edema. There is stable large right pleura
l effusion with lateral wall loculations.



Electronically signed by: Phoenix Ashraf MD (2/18/2021 12:04 PM) [id number]

## 2021-02-19 VITALS — SYSTOLIC BLOOD PRESSURE: 132 MMHG | DIASTOLIC BLOOD PRESSURE: 74 MMHG

## 2021-02-19 VITALS — DIASTOLIC BLOOD PRESSURE: 79 MMHG | SYSTOLIC BLOOD PRESSURE: 133 MMHG

## 2021-02-19 VITALS — DIASTOLIC BLOOD PRESSURE: 68 MMHG | SYSTOLIC BLOOD PRESSURE: 122 MMHG

## 2021-02-19 VITALS — DIASTOLIC BLOOD PRESSURE: 100 MMHG | SYSTOLIC BLOOD PRESSURE: 158 MMHG

## 2021-02-19 VITALS — DIASTOLIC BLOOD PRESSURE: 89 MMHG | SYSTOLIC BLOOD PRESSURE: 154 MMHG

## 2021-02-19 VITALS — SYSTOLIC BLOOD PRESSURE: 138 MMHG | DIASTOLIC BLOOD PRESSURE: 78 MMHG

## 2021-02-19 VITALS — DIASTOLIC BLOOD PRESSURE: 74 MMHG | SYSTOLIC BLOOD PRESSURE: 114 MMHG

## 2021-02-19 VITALS — SYSTOLIC BLOOD PRESSURE: 129 MMHG | DIASTOLIC BLOOD PRESSURE: 78 MMHG

## 2021-02-19 LAB
ANION GAP SERPL CALC-SCNC: 14 MMOL/L (ref 6–14)
BASOPHILS # BLD AUTO: 0 X10^3/UL (ref 0–0.2)
BASOPHILS NFR BLD: 0 % (ref 0–3)
BUN SERPL-MCNC: 31 MG/DL (ref 8–26)
CALCIUM SERPL-MCNC: 7.7 MG/DL (ref 8.5–10.1)
CHLORIDE SERPL-SCNC: 104 MMOL/L (ref 98–107)
CO2 SERPL-SCNC: 24 MMOL/L (ref 21–32)
CREAT SERPL-MCNC: 1.2 MG/DL (ref 0.7–1.3)
EOSINOPHIL NFR BLD: 0 % (ref 0–3)
EOSINOPHIL NFR BLD: 0 X10^3/UL (ref 0–0.7)
ERYTHROCYTE [DISTWIDTH] IN BLOOD BY AUTOMATED COUNT: 23.4 % (ref 11.5–14.5)
GFR SERPLBLD BASED ON 1.73 SQ M-ARVRAT: 70.5 ML/MIN
GLUCOSE SERPL-MCNC: 165 MG/DL (ref 70–99)
HCT VFR BLD CALC: 32.6 % (ref 39–53)
HGB BLD-MCNC: 10.4 G/DL (ref 13–17.5)
LYMPHOCYTES # BLD: 0.2 X10^3/UL (ref 1–4.8)
LYMPHOCYTES NFR BLD AUTO: 2 % (ref 24–48)
MCH RBC QN AUTO: 25 PG (ref 25–35)
MCHC RBC AUTO-ENTMCNC: 32 G/DL (ref 31–37)
MCV RBC AUTO: 79 FL (ref 79–100)
MONO #: 0.5 X10^3/UL (ref 0–1.1)
MONOCYTES NFR BLD: 5 % (ref 0–9)
NEUT #: 9.4 X10^3/UL (ref 1.8–7.7)
NEUTROPHILS NFR BLD AUTO: 92 % (ref 31–73)
PLATELET # BLD AUTO: 484 X10^3/UL (ref 140–400)
POTASSIUM SERPL-SCNC: 4.2 MMOL/L (ref 3.5–5.1)
RBC # BLD AUTO: 4.15 X10^6/UL (ref 4.3–5.7)
SODIUM SERPL-SCNC: 142 MMOL/L (ref 136–145)
WBC # BLD AUTO: 10.2 X10^3/UL (ref 4–11)

## 2021-02-19 RX ADMIN — ATORVASTATIN CALCIUM SCH MG: 40 TABLET, FILM COATED ORAL at 21:16

## 2021-02-19 RX ADMIN — MAGNESIUM OXIDE TAB 400 MG (241.3 MG ELEMENTAL MG) SCH MG: 400 (241.3 MG) TAB at 08:35

## 2021-02-19 RX ADMIN — SACUBITRIL AND VALSARTAN SCH TAB: 24; 26 TABLET, FILM COATED ORAL at 00:50

## 2021-02-19 RX ADMIN — Medication SCH CAP: at 00:49

## 2021-02-19 RX ADMIN — TAMSULOSIN HYDROCHLORIDE SCH MG: 0.4 CAPSULE ORAL at 21:16

## 2021-02-19 RX ADMIN — APIXABAN SCH MG: 2.5 TABLET, FILM COATED ORAL at 00:49

## 2021-02-19 RX ADMIN — Medication SCH CAP: at 21:16

## 2021-02-19 RX ADMIN — TAMSULOSIN HYDROCHLORIDE SCH MG: 0.4 CAPSULE ORAL at 08:36

## 2021-02-19 RX ADMIN — ATORVASTATIN CALCIUM SCH MG: 40 TABLET, FILM COATED ORAL at 00:49

## 2021-02-19 RX ADMIN — APIXABAN SCH MG: 2.5 TABLET, FILM COATED ORAL at 21:16

## 2021-02-19 RX ADMIN — SACUBITRIL AND VALSARTAN SCH TAB: 24; 26 TABLET, FILM COATED ORAL at 08:37

## 2021-02-19 RX ADMIN — PSYLLIUM HUSK SCH PKT: 3.4 POWDER ORAL at 21:16

## 2021-02-19 RX ADMIN — SACUBITRIL AND VALSARTAN SCH TAB: 24; 26 TABLET, FILM COATED ORAL at 21:15

## 2021-02-19 RX ADMIN — Medication SCH CAP: at 08:36

## 2021-02-19 RX ADMIN — SPIRONOLACTONE SCH MG: 25 TABLET ORAL at 08:35

## 2021-02-19 RX ADMIN — ASPIRIN SCH MG: 81 TABLET, COATED ORAL at 08:36

## 2021-02-19 RX ADMIN — METOPROLOL SUCCINATE SCH MG: 50 TABLET, EXTENDED RELEASE ORAL at 16:58

## 2021-02-19 RX ADMIN — TAMSULOSIN HYDROCHLORIDE SCH MG: 0.4 CAPSULE ORAL at 00:49

## 2021-02-19 RX ADMIN — CALCIUM CARBONATE-VITAMIN D TAB 500 MG-200 UNIT SCH TAB: 500-200 TAB at 16:58

## 2021-02-19 RX ADMIN — APIXABAN SCH MG: 2.5 TABLET, FILM COATED ORAL at 08:37

## 2021-02-19 RX ADMIN — CALCIUM CARBONATE-VITAMIN D TAB 500 MG-200 UNIT SCH TAB: 500-200 TAB at 08:36

## 2021-02-19 NOTE — NUR
SW following for discharge planning. Spoke with RN and reviewed chart. Pt currently COVID 
negative, cardiac diet, 2l 02. PT/OT to evaluate. SW following.

-------------------------------------------------------------------------------

Addendum: 02/22/21 at 0956 by LAUREN PRABHAKAR SW

-------------------------------------------------------------------------------

Pt transferred to . No further needs from this SW.

## 2021-02-19 NOTE — PDOC2
CHARANJIT ALVAREZ APRN 2/19/21 1023:


CARDIAC CONSULT


DATE OF CONSULT


Date of Consult


DATE: 2/19/21 


TIME: 10:09





REASON FOR CONSULT


Reason for Consult:


Acute CHF


Atrial arrhythmia





REFERRING PHYSICIAN


Referring Physician:


Dr. Bruce





SOURCE


Source:  Chart review, Patient





HISTORY OF PRESENT ILLNESS


HISTORY OF PRESENT ILLNESS


This is an 79 yo male who presented secondary to shortness of breath. Has been 

short of breath for the last several days. Had appointment scheduled with Dr. Castro, but unfortunately did not have insurance card and was unable to been 

seen. Patient continued to have shortness of breath so he went to the ED for 

further evaluation and treatment. He denies any chest pain, palpitations, di

zziness, diaphoresis, or nausea/vomiting. Breathing has improved s/p diuresis.





PAST MEDICAL HISTORY


Past Medical History


Cardiovascular:  AFIB (paroxysmal), CAD (noted with diffuse CAD in 6/2019), CHF,

HTN, Other (symtomatic bradycardia; severe cardiomyopathy)


Pulmonary:  COPD


CENTRAL NERVOUS SYSTEM:  Other (No pertinent history)


GI:  GERD


Heme/Onc:  Anemia NOS


Hepatobiliary:  No pertinent hx


Psych:  No pertinent hx


Musculoskeletal:  Osteoarthritis


Rheumatologic:  No pertinent hx


Infectious disease:  No pertinent hx


ENT:  No pertinent hx


Renal/:  Benign prostatic enlarg.


Endocrine:  No pertinent hx


Dermatology:  No pertinent hx





PAST SURGICAL HISTORY


Past Surgical History


Pacemaker, Colon Resection, Other (back surgery; past thoracentesis; PCI/stent 

to the RCA 2009)





FAMILY HISTORY


Family History:  Hypertension





SOCIAL HISTORY


Social History


Smoke:  <1 pack per day


ALCOHOL:  none


Drugs:  None


Lives:  with Family





CURRENT MEDICATIONS


CURRENT MEDICATIONS





Current Medications








 Medications


  (Trade)  Dose


 Ordered  Sig/Reyna


 Route


 PRN Reason  Start Time


 Stop Time Status Last Admin


Dose Admin


 


 Albuterol/


 Ipratropium


  (Duoneb)  3 ml  1X  ONCE


 NEB


   2/18/21 10:45


 2/18/21 10:46 DC 2/18/21 10:45





 


 Methylprednisolone


 Sodium Succinate


  (SOLU-Medrol


 125MG VIAL)  125 mg  1X  ONCE


 IV


   2/18/21 10:45


 2/18/21 10:46 DC 2/18/21 11:02





 


 Diltiazem HCl


  (Cardizem Iv


 Push)  20 mg  1X  ONCE


 IVP


   2/18/21 12:00


 2/18/21 12:01 DC 2/18/21 12:02





 


 Diltiazem HCl 125


 mg/Sodium Chloride  125 ml @ 5


 mls/hr  CONT  PRN


 IV


 SEE I/O RECORD  2/18/21 12:15


    2/18/21 12:29





 


 Furosemide


  (Lasix)  40 mg  1X  ONCE


 IVP


   2/18/21 13:15


 2/18/21 13:19 DC 2/18/21 14:00





 


 Apixaban


  (Eliquis)  2.5 mg  BID


 PO


   2/18/21 21:00


    2/19/21 08:37





 


 Aspirin


  (Ecotrin)  81 mg  DAILYWBKFT


 PO


   2/19/21 08:00


    2/19/21 08:36





 


 Atorvastatin


 Calcium


  (Lipitor)  40 mg  QHS


 PO


   2/18/21 21:00


    2/19/21 00:49





 


 Calcium/Vitamin D


  (Oscal D 500mg/


 200uts)  2 tab  BIDWMEALS


 PO


   2/19/21 08:00


    2/19/21 08:36





 


 Lactobacillus


 Rhamnosus


  (Culturelle)  1 cap  BID


 PO


   2/18/21 21:00


    2/19/21 08:36





 


 Sacubitril/


 Valsartan


  (Entresto 24


 Mg-26 Mg)  1 tab  BID


 PO


   2/18/21 21:00


    2/19/21 08:37





 


 Spironolactone


  (Aldactone)  25 mg  DAILY


 PO


   2/19/21 09:00


    2/19/21 08:35





 


 Tamsulosin HCl


  (Flomax)  0.4 mg  BID


 PO


   2/18/21 21:00


    2/19/21 08:36





 


 Carvedilol


  (Coreg)  12.5 mg  BIDWMEALS


 PO


   2/19/21 08:00


    2/19/21 08:36





 


 Magnesium Oxide


  (Magnesium Oxide)  400 mg  DAILY


 PO


   2/19/21 09:00


    2/19/21 08:35














ALLERGIES


ALLERGIES:  


Coded Allergies:  


     No Known Drug Allergies (Unverified , 5/25/18)





ROS


Review of System


14 point ROS conducted with pertinent positives noted above in HPI





PHYSICAL EXAM


PHYSICAL EXAM


General:  Alert, Oriented X3, Cooperative, No acute distress


HEENT:  Atraumatic, Mucous membr. moist/pink


Lungs:  Other (diminished, faint wheeze)


Heart:  IRRR (AFIB), Other (distant heart sounds)


Abdomen:  Soft, No tenderness


Extremities:  No cyanosis, Other (trace-1+ bilateral LE pitting edema)


Skin:  No breakdown, No significant lesion


Neuro:  Normal speech, Sensation intact


Psych/Mental Status:  Mood NL


MUSCULOSKELETAL:  Osteoarthritic changes both hands





VITALS/I&O


VITALS/I&O:





                                   Vital Signs








  Date Time  Temp Pulse Resp B/P (MAP) Pulse Ox O2 Delivery O2 Flow Rate FiO2


 


2/19/21 08:37  88  132/74    


 


2/19/21 07:00 96.5  28   Nasal Cannula 2.0 





 96.5       


 


2/18/21 17:25     97   














                                    I & O   


 


 2/18/21 2/18/21 2/19/21





 15:00 23:00 07:00


 


Intake Total  450 ml 0 ml


 


Output Total  800 ml 


 


Balance  -350 ml 0 ml











LABS


Lab:





                                Laboratory Tests








Test


 2/18/21


10:35 2/18/21


10:57 2/18/21


11:48 2/18/21


22:30


 


NT-Pro-B-Type Natriuretic


Peptide 01368 pg/mL


(0-449)  H 


 


 





 


Coronavirus (PCR)


 


 Not detected


(Not Detected) 


 





 


Influenza Type A Antigen


 


 Negative


(NEGATIVE) 


 





 


Influenza Type B Antigen


 


 Negative


(NEGATIVE) 


 





 


White Blood Count


 


 


 7.6 x10^3/uL


(4.0-11.0) 





 


Red Blood Count


 


 


 4.57 x10^6/uL


(4.30-5.70) 





 


Hemoglobin


 


 


 11.2 g/dL


(13.0-17.5)  L 





 


Hematocrit


 


 


 36.2 %


(39.0-53.0)  L 





 


Mean Corpuscular Volume


 


 


 79 fL ()


 





 


Mean Corpuscular Hemoglobin   25 pg (25-35)   


 


Mean Corpuscular Hemoglobin


Concent 


 


 31 g/dL


(31-37) 





 


Red Cell Distribution Width


 


 


 23.7 %


(11.5-14.5)  H 





 


Platelet Count


 


 


 521 x10^3/uL


(140-400)  H 





 


Neutrophils (%) (Auto)   86 % (31-73)  H 


 


Lymphocytes (%) (Auto)   5 % (24-48)  L 


 


Monocytes (%) (Auto)   9 % (0-9)   


 


Eosinophils (%) (Auto)   0 % (0-3)   


 


Basophils (%) (Auto)   0 % (0-3)   


 


Neutrophils # (Auto)


 


 


 6.6 x10^3/uL


(1.8-7.7) 





 


Lymphocytes # (Auto)


 


 


 0.3 x10^3/uL


(1.0-4.8)  L 





 


Monocytes # (Auto)


 


 


 0.7 x10^3/uL


(0.0-1.1) 





 


Eosinophils # (Auto)


 


 


 0.0 x10^3/uL


(0.0-0.7) 





 


Basophils # (Auto)


 


 


 0.0 x10^3/uL


(0.0-0.2) 





 


Segmented Neutrophils %   90 % (35-66)  H 


 


Lymphocytes %   5 % (24-48)  L 


 


Monocytes %   4 % (0-10)   


 


Basophils %   1 % (0-3)   


 


Nucleated Red Blood Cells   1   


 


Platelet Estimate


 


 


 Increased


(ADEQUATE) 





 


Hypochromasia   Slight   


 


Poikilocytosis   Slight   


 


Anisocytosis   Mod   


 


Sodium Level


 


 


 146 mmol/L


(136-145)  H 





 


Potassium Level


 


 


 4.1 mmol/L


(3.5-5.1) 





 


Chloride Level


 


 


 108 mmol/L


()  H 





 


Carbon Dioxide Level


 


 


 26 mmol/L


(21-32) 





 


Anion Gap   12 (6-14)   


 


Blood Urea Nitrogen


 


 


 21 mg/dL


(8-26) 





 


Creatinine


 


 


 1.1 mg/dL


(0.7-1.3) 





 


Estimated GFR


(Cockcroft-Gault) 


 


 77.9  


 





 


BUN/Creatinine Ratio   19 (6-20)   


 


Glucose Level


 


 


 127 mg/dL


(70-99)  H 





 


Calcium Level


 


 


 7.8 mg/dL


(8.5-10.1)  L 





 


Total Bilirubin


 


 


 0.5 mg/dL


(0.2-1.0) 





 


Aspartate Amino Transferase


(AST) 


 


 22 U/L (15-37)


 





 


Alanine Aminotransferase (ALT)


 


 


 24 U/L (16-63)


 





 


Alkaline Phosphatase


 


 


 117 U/L


()  H 





 


Troponin I Quantitative


 


 


 0.024 ng/mL


(0.000-0.055) 0.019 ng/mL


(0.000-0.055)


 


Total Protein


 


 


 7.6 g/dL


(6.4-8.2) 





 


Albumin


 


 


 3.0 g/dL


(3.4-5.0)  L 





 


Albumin/Globulin Ratio


 


 


 0.7 (1.0-1.7)


L 





 


Test


 2/19/21


03:30 


 


 





 


White Blood Count


 10.2 x10^3/uL


(4.0-11.0) 


 


 





 


Red Blood Count


 4.15 x10^6/uL


(4.30-5.70)  L 


 


 





 


Hemoglobin


 10.4 g/dL


(13.0-17.5)  L 


 


 





 


Hematocrit


 32.6 %


(39.0-53.0)  L 


 


 





 


Mean Corpuscular Volume


 79 fL ()


 


 


 





 


Mean Corpuscular Hemoglobin 25 pg (25-35)     


 


Mean Corpuscular Hemoglobin


Concent 32 g/dL


(31-37) 


 


 





 


Red Cell Distribution Width


 23.4 %


(11.5-14.5)  H 


 


 





 


Platelet Count


 484 x10^3/uL


(140-400)  H 


 


 





 


Neutrophils (%) (Auto) 92 % (31-73)  H   


 


Lymphocytes (%) (Auto) 2 % (24-48)  L   


 


Monocytes (%) (Auto) 5 % (0-9)     


 


Eosinophils (%) (Auto) 0 % (0-3)     


 


Basophils (%) (Auto) 0 % (0-3)     


 


Neutrophils # (Auto)


 9.4 x10^3/uL


(1.8-7.7)  H 


 


 





 


Lymphocytes # (Auto)


 0.2 x10^3/uL


(1.0-4.8)  L 


 


 





 


Monocytes # (Auto)


 0.5 x10^3/uL


(0.0-1.1) 


 


 





 


Eosinophils # (Auto)


 0.0 x10^3/uL


(0.0-0.7) 


 


 





 


Basophils # (Auto)


 0.0 x10^3/uL


(0.0-0.2) 


 


 





 


Sodium Level


 142 mmol/L


(136-145) 


 


 





 


Potassium Level


 4.2 mmol/L


(3.5-5.1) 


 


 





 


Chloride Level


 104 mmol/L


() 


 


 





 


Carbon Dioxide Level


 24 mmol/L


(21-32) 


 


 





 


Anion Gap 14 (6-14)     


 


Blood Urea Nitrogen


 31 mg/dL


(8-26)  H 


 


 





 


Creatinine


 1.2 mg/dL


(0.7-1.3) 


 


 





 


Estimated GFR


(Cockcroft-Gault) 70.5  


 


 


 





 


Glucose Level


 165 mg/dL


(70-99)  H 


 


 





 


Calcium Level


 7.7 mg/dL


(8.5-10.1)  L 


 


 





 


Troponin I Quantitative


 0.023 ng/mL


(0.000-0.055) 


 


 





 


Thyroid Stimulating Hormone


(TSH) 0.912 uIU/mL


(0.358-3.74) 


 


 








                                Laboratory Tests


2/18/21 11:48








2/19/21 03:30








                                Laboratory Tests


2/18/21 11:48








2/19/21 03:30











ECHOCARDIOGRAM


ECHOCARDIOGRAM


<Conclusion>


Left ventricle systolic function is severely impaired.


Akinetic base to mid inferior wall.


The Ejection Fraction is 15%.


Transmitral Doppler flow pattern is Grade I-abnormal relaxation pattern.


There is a pacemaker lead in the right atrium and right ventricle.


Trace mitral regurgitation.


Mild tricuspid regurgitation.


The PA pressure was estimated at 50 mmHg.


There is no evidence of significant pericardial effusion.





DATE:     01/06/20 1240








<Conclusion>


The left ventricular systolic function is severely impaired.


Base to mid inferior wall akinetic.


The Ejection Fraction is 15-20%.


Transmitral Doppler flow pattern is Grade I-abnormal relaxation pattern.


There is a pacemaker lead in the right atrium and ventricle.


Trace mitral regurgitation.


Mild tricuspid regurgitation with an estimated PAP of 40 mmHg.


There is no evidence of significant pericardial effusion.





DATE:     12/30/20





ASSESSMENT/PLAN


ASSESSMENT/PLAN


1.  Acute on chronic diastolic, systolic CHF


2.  Mixed ischemic/ NICM; Echo 12/20 with LVEF 15-20%  


4.  CAD s/p PCI/stent to the RCA 2009; CP free. Last [facility] 6/2019 [facility] with moderate

diffuse CAD


5.  SSS s/p PPM ([employer])


6.  Accelerated hypertension: improving


7.  PAFIB with RVR; rate now controlled but remains in AFIB


8.  Hyperlipidemia


9.  PUI; COVID negative 





Recommendations





Lasix IV therapy


Continue Entresto


Will convert Coreg to Toprol for better rate control. Titrate off Cardizem gtt


Secondary prevention; ASA/statin 


Eliquis for stroke prevention


Consider outpatient ischemic evaluation and upgrade to AICD as an outpatient.














JASON RUSSO MD 2/19/21 1619:


CARDIAC CONSULT


ASSESSMENT/PLAN


ASSESSMENT/PLAN


Patient seen and evaluated


I agree with our nurse practitioners assessment and plan as above.


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  With a history of coronary artery 

disease.  Ejection fraction of 15 to 20%.  IV Lasix with monitoring of lab.


CAD s/p PCI/stent to the RCA 2009; CP free. Last [facility] 6/2019 [facility] with moderate 

diffuse CAD


SSS s/p PPM ([employer])


Accelerated hypertension: improving


PAFIB with RVR; rate now controlled but remains in AFIB.  Changing beta-blocker 

to Toprol for better rate control.  On Eliquis.


Hyperlipidemia











CHARANJIT ALVAREZ           Feb 19, 2021 10:23


JASON RUSSO MD            Feb 19, 2021 16:19

## 2021-02-19 NOTE — PDOC
[employer] PROGRESS NOTE


Date of Service


DOS:


DATE: 2/19/21 


TIME: 07:32





Chief Complaint


Chief Complaint


A/P:


Acute on chronic diastolic systolic CHF with EF 15% - IV lasix, looks in florid 

CHF. Cardiology consulted


Ischemic cardiomyopathy


CAD s/p PCI/stent to the RCA 2009 - coronary angiogram 6/2019 [facility] with moderate 

diffuse CAD


SSS s/p PPM ([employer]) - needs interrogation


HTN - cont home meds


PAFIB - afib with RVR currently. Is on eliquis


Hyperlipidemia - cont statin





FEN - Cardiac diet


PPX - eliquis


FULL CODE


Dispo - inpatient





History of Present Illness


History of Present Illness


Mr Barber is an 80-year-old male w/ PMHx SSS s/p PPM, HTN, diastolic CHF, 

smoker presents to the ED c/o progressive shortness of air that has progressed 

over the past month. He was going to a cardiology appointment prior to this, but

states he forgot his paperwork and came to ED as his shortness of breath was 

unbearable. Associated PND, orthopnea and LE edema. He notes decreased UOP and 

has been compliant with meds. No new cough or sick exposures.  He further denies

any nausea, diaphoresis or feeling of being lightheaded. 





Chest radiograph concerning for pulmonary edema with large right pleural 

effusion with lateral wall loculations previously noted


EKG was irregularly irregular rhythm no significant P waves found. BNP [id number]


Started on IV lasix and diltiazem infusion for afib with RVR and admitted for 

further care





Placed on 2 L of O2 overnight.  He is breathing more comfortably.  Eating 

breakfast.  Labs stable.





Vitals/I&O


Vitals/I&O:





                                   Vital Signs








  Date Time  Temp Pulse Resp B/P (MAP) Pulse Ox O2 Delivery O2 Flow Rate FiO2


 


2/19/21 03:21 97.5 95 22 158/100 (119)  Nasal Cannula 2.0 





 97.5       


 


2/18/21 17:25     97   














                                    I & O   


 


 2/18/21 2/18/21 2/19/21





 15:00 23:00 07:00


 


Intake Total  450 ml 0 ml


 


Output Total  800 ml 


 


Balance  -350 ml 0 ml











Physical Exam


General:  Alert, Oriented X3, Cooperative, moderate distress


Lungs:  Clear


Abdomen:  Normal bowel sounds, Soft, No tenderness, No hepatosplenomegaly, No 

masses


Extremities:  No clubbing, No cyanosis, No edema, Normal pulses, No 

tenderness/swelling


Skin:  No rashes, No breakdown, No significant lesion





Labs


Labs:





Laboratory Tests








Test


 2/18/21


10:35 2/18/21


10:57 2/18/21


11:48 2/18/21


22:30


 


NT-Pro-B-Type Natriuretic


Peptide 13189 pg/mL


(0-449) 


 


 





 


Coronavirus (PCR)


 


 Not detected


(Not Detected) 


 





 


Influenza Type A Antigen


 


 Negative


(NEGATIVE) 


 





 


Influenza Type B Antigen


 


 Negative


(NEGATIVE) 


 





 


White Blood Count


 


 


 7.6 x10^3/uL


(4.0-11.0) 





 


Red Blood Count


 


 


 4.57 x10^6/uL


(4.30-5.70) 





 


Hemoglobin


 


 


 11.2 g/dL


(13.0-17.5) 





 


Hematocrit


 


 


 36.2 %


(39.0-53.0) 





 


Mean Corpuscular Volume   79 fL ()  


 


Mean Corpuscular Hemoglobin   25 pg (25-35)  


 


Mean Corpuscular Hemoglobin


Concent 


 


 31 g/dL


(31-37) 





 


Red Cell Distribution Width


 


 


 23.7 %


(11.5-14.5) 





 


Platelet Count


 


 


 521 x10^3/uL


(140-400) 





 


Neutrophils (%) (Auto)   86 % (31-73)  


 


Lymphocytes (%) (Auto)   5 % (24-48)  


 


Monocytes (%) (Auto)   9 % (0-9)  


 


Eosinophils (%) (Auto)   0 % (0-3)  


 


Basophils (%) (Auto)   0 % (0-3)  


 


Neutrophils # (Auto)


 


 


 6.6 x10^3/uL


(1.8-7.7) 





 


Lymphocytes # (Auto)


 


 


 0.3 x10^3/uL


(1.0-4.8) 





 


Monocytes # (Auto)


 


 


 0.7 x10^3/uL


(0.0-1.1) 





 


Eosinophils # (Auto)


 


 


 0.0 x10^3/uL


(0.0-0.7) 





 


Basophils # (Auto)


 


 


 0.0 x10^3/uL


(0.0-0.2) 





 


Segmented Neutrophils %   90 % (35-66)  


 


Lymphocytes %   5 % (24-48)  


 


Monocytes %   4 % (0-10)  


 


Basophils %   1 % (0-3)  


 


Nucleated Red Blood Cells   1  


 


Platelet Estimate


 


 


 Increased


(ADEQUATE) 





 


Hypochromasia   Slight  


 


Poikilocytosis   Slight  


 


Anisocytosis   Mod  


 


Sodium Level


 


 


 146 mmol/L


(136-145) 





 


Potassium Level


 


 


 4.1 mmol/L


(3.5-5.1) 





 


Chloride Level


 


 


 108 mmol/L


() 





 


Carbon Dioxide Level


 


 


 26 mmol/L


(21-32) 





 


Anion Gap   12 (6-14)  


 


Blood Urea Nitrogen


 


 


 21 mg/dL


(8-26) 





 


Creatinine


 


 


 1.1 mg/dL


(0.7-1.3) 





 


Estimated GFR


(Cockcroft-Gault) 


 


 77.9 


 





 


BUN/Creatinine Ratio   19 (6-20)  


 


Glucose Level


 


 


 127 mg/dL


(70-99) 





 


Calcium Level


 


 


 7.8 mg/dL


(8.5-10.1) 





 


Total Bilirubin


 


 


 0.5 mg/dL


(0.2-1.0) 





 


Aspartate Amino Transf


(AST/SGOT) 


 


 22 U/L (15-37) 


 





 


Alanine Aminotransferase


(ALT/SGPT) 


 


 24 U/L (16-63) 


 





 


Alkaline Phosphatase


 


 


 117 U/L


() 





 


Troponin I Quantitative


 


 


 0.024 ng/mL


(0.000-0.055) 0.019 ng/mL


(0.000-0.055)


 


Total Protein


 


 


 7.6 g/dL


(6.4-8.2) 





 


Albumin


 


 


 3.0 g/dL


(3.4-5.0) 





 


Albumin/Globulin Ratio   0.7 (1.0-1.7)  


 


Test


 2/19/21


03:30 


 


 





 


White Blood Count


 10.2 x10^3/uL


(4.0-11.0) 


 


 





 


Red Blood Count


 4.15 x10^6/uL


(4.30-5.70) 


 


 





 


Hemoglobin


 10.4 g/dL


(13.0-17.5) 


 


 





 


Hematocrit


 32.6 %


(39.0-53.0) 


 


 





 


Mean Corpuscular Volume 79 fL ()    


 


Mean Corpuscular Hemoglobin 25 pg (25-35)    


 


Mean Corpuscular Hemoglobin


Concent 32 g/dL


(31-37) 


 


 





 


Red Cell Distribution Width


 23.4 %


(11.5-14.5) 


 


 





 


Platelet Count


 484 x10^3/uL


(140-400) 


 


 





 


Neutrophils (%) (Auto) 92 % (31-73)    


 


Lymphocytes (%) (Auto) 2 % (24-48)    


 


Monocytes (%) (Auto) 5 % (0-9)    


 


Eosinophils (%) (Auto) 0 % (0-3)    


 


Basophils (%) (Auto) 0 % (0-3)    


 


Neutrophils # (Auto)


 9.4 x10^3/uL


(1.8-7.7) 


 


 





 


Lymphocytes # (Auto)


 0.2 x10^3/uL


(1.0-4.8) 


 


 





 


Monocytes # (Auto)


 0.5 x10^3/uL


(0.0-1.1) 


 


 





 


Eosinophils # (Auto)


 0.0 x10^3/uL


(0.0-0.7) 


 


 





 


Basophils # (Auto)


 0.0 x10^3/uL


(0.0-0.2) 


 


 





 


Sodium Level


 142 mmol/L


(136-145) 


 


 





 


Potassium Level


 4.2 mmol/L


(3.5-5.1) 


 


 





 


Chloride Level


 104 mmol/L


() 


 


 





 


Carbon Dioxide Level


 24 mmol/L


(21-32) 


 


 





 


Anion Gap 14 (6-14)    


 


Blood Urea Nitrogen


 31 mg/dL


(8-26) 


 


 





 


Creatinine


 1.2 mg/dL


(0.7-1.3) 


 


 





 


Estimated GFR


(Cockcroft-Gault) 70.5 


 


 


 





 


Glucose Level


 165 mg/dL


(70-99) 


 


 





 


Calcium Level


 7.7 mg/dL


(8.5-10.1) 


 


 





 


Troponin I Quantitative


 0.023 ng/mL


(0.000-0.055) 


 


 














Assessment and Plan


Assessmemt and Plan


Problems


Medical Problems:


(1) Atrial fibrillation with rapid ventricular response


Status: Acute  





(2) CHF (congestive heart failure)


Status: Acute  











Comment


Review of Relevant


I have reviewed the following items dao (where applicable) has been applied.


Medications:





Current Medications








 Medications


  (Trade)  Dose


 Ordered  Sig/Reyna


 Route


 PRN Reason  Start Time


 Stop Time Status Last Admin


Dose Admin


 


 Albuterol/


 Ipratropium


  (Duoneb)  3 ml  1X  ONCE


 NEB


   2/18/21 10:45


 2/18/21 10:46 DC 2/18/21 10:45





 


 Methylprednisolone


 Sodium Succinate


  (SOLU-Medrol


 125MG VIAL)  125 mg  1X  ONCE


 IV


   2/18/21 10:45


 2/18/21 10:46 DC 2/18/21 11:02





 


 Diltiazem HCl


  (Cardizem Iv


 Push)  20 mg  1X  ONCE


 IVP


   2/18/21 12:00


 2/18/21 12:01 DC 2/18/21 12:02





 


 Diltiazem HCl 125


 mg/Sodium Chloride  125 ml @ 5


 mls/hr  CONT  PRN


 IV


 SEE I/O RECORD  2/18/21 12:15


    2/18/21 12:29





 


 Furosemide


  (Lasix)  40 mg  1X  ONCE


 IVP


   2/18/21 13:15


 2/18/21 13:19 DC 2/18/21 14:00





 


 Apixaban


  (Eliquis)  2.5 mg  BID


 PO


   2/18/21 21:00


    2/19/21 00:49





 


 Atorvastatin


 Calcium


  (Lipitor)  40 mg  QHS


 PO


   2/18/21 21:00


    2/19/21 00:49





 


 Lactobacillus


 Rhamnosus


  (Culturelle)  1 cap  BID


 PO


   2/18/21 21:00


    2/19/21 00:49





 


 Sacubitril/


 Valsartan


  (Entresto 24


 Mg-26 Mg)  1 tab  BID


 PO


   2/18/21 21:00


    2/19/21 00:50





 


 Tamsulosin HCl


  (Flomax)  0.4 mg  BID


 PO


   2/18/21 21:00


    2/19/21 00:49














Justifications for Admission


Other Justification














KAYLI GOMES MD        Feb 19, 2021 07:33

## 2021-02-20 VITALS — SYSTOLIC BLOOD PRESSURE: 103 MMHG | DIASTOLIC BLOOD PRESSURE: 73 MMHG

## 2021-02-20 VITALS — SYSTOLIC BLOOD PRESSURE: 125 MMHG | DIASTOLIC BLOOD PRESSURE: 56 MMHG

## 2021-02-20 VITALS — DIASTOLIC BLOOD PRESSURE: 76 MMHG | SYSTOLIC BLOOD PRESSURE: 137 MMHG

## 2021-02-20 VITALS — DIASTOLIC BLOOD PRESSURE: 74 MMHG | SYSTOLIC BLOOD PRESSURE: 131 MMHG

## 2021-02-20 VITALS — SYSTOLIC BLOOD PRESSURE: 134 MMHG | DIASTOLIC BLOOD PRESSURE: 67 MMHG

## 2021-02-20 VITALS — SYSTOLIC BLOOD PRESSURE: 130 MMHG | DIASTOLIC BLOOD PRESSURE: 79 MMHG

## 2021-02-20 LAB
ANION GAP SERPL CALC-SCNC: 9 MMOL/L (ref 6–14)
BUN SERPL-MCNC: 30 MG/DL (ref 8–26)
CALCIUM SERPL-MCNC: 6.8 MG/DL (ref 8.5–10.1)
CHLORIDE SERPL-SCNC: 105 MMOL/L (ref 98–107)
CO2 SERPL-SCNC: 27 MMOL/L (ref 21–32)
CREAT SERPL-MCNC: 1.1 MG/DL (ref 0.7–1.3)
GFR SERPLBLD BASED ON 1.73 SQ M-ARVRAT: 77.9 ML/MIN
GLUCOSE SERPL-MCNC: 127 MG/DL (ref 70–99)
HBA1C MFR BLD: 6.6 % (ref 4.8–5.6)
POTASSIUM SERPL-SCNC: 4.1 MMOL/L (ref 3.5–5.1)
SODIUM SERPL-SCNC: 141 MMOL/L (ref 136–145)

## 2021-02-20 RX ADMIN — APIXABAN SCH MG: 2.5 TABLET, FILM COATED ORAL at 08:10

## 2021-02-20 RX ADMIN — Medication SCH CAP: at 08:11

## 2021-02-20 RX ADMIN — MAGNESIUM OXIDE TAB 400 MG (241.3 MG ELEMENTAL MG) SCH MG: 400 (241.3 MG) TAB at 08:10

## 2021-02-20 RX ADMIN — ATORVASTATIN CALCIUM SCH MG: 40 TABLET, FILM COATED ORAL at 22:41

## 2021-02-20 RX ADMIN — SACUBITRIL AND VALSARTAN SCH TAB: 24; 26 TABLET, FILM COATED ORAL at 08:10

## 2021-02-20 RX ADMIN — ASPIRIN SCH MG: 81 TABLET, COATED ORAL at 08:11

## 2021-02-20 RX ADMIN — TAMSULOSIN HYDROCHLORIDE SCH MG: 0.4 CAPSULE ORAL at 08:11

## 2021-02-20 RX ADMIN — PSYLLIUM HUSK SCH PKT: 3.4 POWDER ORAL at 22:41

## 2021-02-20 RX ADMIN — CALCIUM CARBONATE-VITAMIN D TAB 500 MG-200 UNIT SCH TAB: 500-200 TAB at 08:11

## 2021-02-20 RX ADMIN — Medication SCH CAP: at 22:42

## 2021-02-20 RX ADMIN — CALCIUM CARBONATE-VITAMIN D TAB 500 MG-200 UNIT SCH TAB: 500-200 TAB at 17:44

## 2021-02-20 RX ADMIN — SACUBITRIL AND VALSARTAN SCH TAB: 24; 26 TABLET, FILM COATED ORAL at 22:42

## 2021-02-20 RX ADMIN — SPIRONOLACTONE SCH MG: 25 TABLET ORAL at 08:12

## 2021-02-20 RX ADMIN — TAMSULOSIN HYDROCHLORIDE SCH MG: 0.4 CAPSULE ORAL at 22:42

## 2021-02-20 RX ADMIN — METOPROLOL SUCCINATE SCH MG: 50 TABLET, EXTENDED RELEASE ORAL at 08:11

## 2021-02-20 RX ADMIN — APIXABAN SCH MG: 2.5 TABLET, FILM COATED ORAL at 22:41

## 2021-02-20 NOTE — PDOC
[employer] PROGRESS NOTE


Date of Service


DOS:


DATE: 2/20/21 


TIME: 13:07





Chief Complaint


Chief Complaint


A/P:


Acute on chronic diastolic systolic CHF with EF 15% - IV lasix, looks in florid 

CHF. Cardiology consulted


Ischemic cardiomyopathy


CAD s/p PCI/stent to the RCA 2009 - coronary angiogram 6/2019 [facility] with moderate 

diffuse CAD


SSS s/p PPM ([employer]) - needs interrogation


HTN - cont home meds


PAFIB - afib with RVR currently. Is on eliquis


Hyperlipidemia - cont statin





FEN - Cardiac diet


PPX - eliquis


FULL CODE


Dispo - inpatient





History of Present Illness


History of Present Illness


Mr Barber is an 80-year-old male w/ PMHx SSS s/p PPM, HTN, diastolic CHF, 

smoker presents to the ED c/o progressive shortness of air that has progressed 

over the past month. He was going to a cardiology appointment prior to this, but

states he forgot his paperwork and came to ED as his shortness of breath was 

unbearable. Associated PND, orthopnea and LE edema. He notes decreased UOP and 

has been compliant with meds. No new cough or sick exposures.  He further denies

any nausea, diaphoresis or feeling of being lightheaded. 





Chest radiograph concerning for pulmonary edema with large right pleural 

effusion with lateral wall loculations previously noted


EKG was irregularly irregular rhythm no significant P waves found. BNP [id number]


Started on IV lasix and diltiazem infusion for afib with RVR and admitted for 

further care





2/19: Placed on 2 L of O2 overnight.  He is breathing more comfortably.  Eating 

breakfast.  Labs stable. Off diltiazem, changed coreg to metoprolol





Off O2 currently.  Increased urine output with Lasix dosing yesterday.  

Breathing comfortably.  Eating breakfast.  Labs stable.  Tolerating metoprolol 

well with heart rate in 60s to 80s.  Still in A. fib.  Transferring to [facility] t

manpreet.





Vitals/I&O


Vitals/I&O:





                                   Vital Signs








  Date Time  Temp Pulse Resp B/P (MAP) Pulse Ox O2 Delivery O2 Flow Rate FiO2


 


2/20/21 11:00 96.2 72 20 103/73 (83) 100 Nasal Cannula 2.0 





 96.2       














                                    I & O0   


 


 2/19/21 2/19/21 2/20/21





 15:00 23:00 07:00


 


Intake Total   300 ml


 


Output Total 200 ml  900 ml


 


Balance -200 ml  -600 ml











Physical Exam


General:  Alert, Oriented X3, Cooperative, moderate distress


Lungs:  Clear


Abdomen:  Normal bowel sounds, Soft, No tenderness, No hepatosplenomegaly, No 

masses


Extremities:  No clubbing, No cyanosis, No edema, Normal pulses, No 

tenderness/swelling


Skin:  No rashes, No breakdown, No significant lesion





Labs


Labs:





Laboratory Tests








Test


 2/20/21


04:00


 


Sodium Level


 141 mmol/L


(136-145)


 


Potassium Level


 4.1 mmol/L


(3.5-5.1)


 


Chloride Level


 105 mmol/L


()


 


Carbon Dioxide Level


 27 mmol/L


(21-32)


 


Anion Gap 9 (6-14) 


 


Blood Urea Nitrogen


 30 mg/dL


(8-26)


 


Creatinine


 1.1 mg/dL


(0.7-1.3)


 


Estimated GFR


(Cockcroft-Gault) 77.9 





 


Glucose Level


 127 mg/dL


(70-99)


 


Calcium Level


 6.8 mg/dL


(8.5-10.1)











Assessment and Plan


Assessmemt and Plan


Problems


Medical Problems:


(1) Atrial fibrillation with rapid ventricular response


Status: Acute  





(2) CHF (congestive heart failure)


Status: Acute  











Comment


Review of Relevant


I have reviewed the following items dao (where applicable) has been applied.


Medications:





Current Medications








 Medications


  (Trade)  Dose


 Ordered  Sig/Reyna


 Route


 PRN Reason  Start Time


 Stop Time Status Last Admin


Dose Admin


 


 Metoprolol


 Succinate


  (Toprol Xl)  50 mg  DAILY


 PO


   2/19/21 16:00


    2/20/21 08:11





 


 Furosemide


  (Lasix)  40 mg  1X  ONCE


 IVP


   2/19/21 16:00


 2/19/21 16:03 DC 2/19/21 16:57





 


 Potassium Chloride


  (Klor-Con)  20 meq  1X  ONCE


 PO


   2/19/21 16:00


 2/19/21 16:03 DC 2/19/21 16:57














Justifications for Admission


Other Justification














KAYLI GOMES MD        Feb 20, 2021 13:10

## 2021-02-20 NOTE — NUR
PT TRANSFERRING TO ROOM 256. REPORT GIVENT O TRACIE BEDSIDE. ALL BELONGINGS LEFT WITH THE PT 
AT THE TIME OF TRANSFER.

## 2021-02-20 NOTE — PDOC
PROGRESS NOTES


Date of Service


DATE: 2/20/21 


TIME: 16:47





Subjective


Subjective


Patient seen and examined





Objective


Objective





Vital Signs








  Date Time  Temp Pulse Resp B/P (MAP) Pulse Ox O2 Delivery O2 Flow Rate FiO2


 


2/20/21 11:00 96.2 72 20 103/73 (83) 100 Nasal Cannula 2.0 





 96.2       














Intake and Output 


 


 2/20/21





 07:00


 


Intake Total 300 ml


 


Output Total 1100 ml


 


Balance -800 ml


 


 


 


Intake Oral 300 ml


 


Output Urine Total 1100 ml











Physical Exam


Abdomen:  Normal bowel sounds


Heart:  Other (Irregularly irregular)


General:  mild distress


Lungs:  Other (Decreased breath sounds)





Assessment


Assessment


Problems


Medical Problems:


(1) Atrial fibrillation with rapid ventricular response


Status: Acute  





(2) CHF (congestive heart failure)


Status: Acute  





ASSESSMENT/PLAN


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  Ejection fraction of 15 to 20%.  

Mildly improved today.  We will continue present treatment.


CAD s/p PCI/stent to the RCA 2009; CP free. Last [facility] 6/2019 [facility] with moderate 

diffuse CAD


SSS s/p PPM ([employer])


Accelerated hypertension: improving


PAFIB with RVR; rate now controlled but remains in AFIB


Hyperlipidemia


PUI; COVID negative





Comment


Review of Relevant


I have reviewed the following items dao (where applicable) has been applied.


Labs





Laboratory Tests








Test


 2/18/21


22:30 2/19/21


03:30 2/20/21


04:00


 


Troponin I Quantitative


 0.019 ng/mL


(0.000-0.055) 0.023 ng/mL


(0.000-0.055) 





 


White Blood Count


 


 10.2 x10^3/uL


(4.0-11.0) 





 


Red Blood Count


 


 4.15 x10^6/uL


(4.30-5.70) 





 


Hemoglobin


 


 10.4 g/dL


(13.0-17.5) 





 


Hematocrit


 


 32.6 %


(39.0-53.0) 





 


Mean Corpuscular Volume  79 fL ()  


 


Mean Corpuscular Hemoglobin  25 pg (25-35)  


 


Mean Corpuscular Hemoglobin


Concent 


 32 g/dL


(31-37) 





 


Red Cell Distribution Width


 


 23.4 %


(11.5-14.5) 





 


Platelet Count


 


 484 x10^3/uL


(140-400) 





 


Neutrophils (%) (Auto)  92 % (31-73)  


 


Lymphocytes (%) (Auto)  2 % (24-48)  


 


Monocytes (%) (Auto)  5 % (0-9)  


 


Eosinophils (%) (Auto)  0 % (0-3)  


 


Basophils (%) (Auto)  0 % (0-3)  


 


Neutrophils # (Auto)


 


 9.4 x10^3/uL


(1.8-7.7) 





 


Lymphocytes # (Auto)


 


 0.2 x10^3/uL


(1.0-4.8) 





 


Monocytes # (Auto)


 


 0.5 x10^3/uL


(0.0-1.1) 





 


Eosinophils # (Auto)


 


 0.0 x10^3/uL


(0.0-0.7) 





 


Basophils # (Auto)


 


 0.0 x10^3/uL


(0.0-0.2) 





 


Sodium Level


 


 142 mmol/L


(136-145) 141 mmol/L


(136-145)


 


Potassium Level


 


 4.2 mmol/L


(3.5-5.1) 4.1 mmol/L


(3.5-5.1)


 


Chloride Level


 


 104 mmol/L


() 105 mmol/L


()


 


Carbon Dioxide Level


 


 24 mmol/L


(21-32) 27 mmol/L


(21-32)


 


Anion Gap  14 (6-14)  9 (6-14) 


 


Blood Urea Nitrogen


 


 31 mg/dL


(8-26) 30 mg/dL


(8-26)


 


Creatinine


 


 1.2 mg/dL


(0.7-1.3) 1.1 mg/dL


(0.7-1.3)


 


Estimated GFR


(Cockcroft-Gault) 


 70.5 


 77.9 





 


Glucose Level


 


 165 mg/dL


(70-99) 127 mg/dL


(70-99)


 


Hemoglobin A1c


 


 6.6 %


(4.8-5.6) 





 


Calcium Level


 


 7.7 mg/dL


(8.5-10.1) 6.8 mg/dL


(8.5-10.1)


 


Thyroid Stimulating Hormone


(TSH) 


 0.912 uIU/mL


(0.358-3.74) 











Laboratory Tests








Test


 2/20/21


04:00


 


Sodium Level


 141 mmol/L


(136-145)


 


Potassium Level


 4.1 mmol/L


(3.5-5.1)


 


Chloride Level


 105 mmol/L


()


 


Carbon Dioxide Level


 27 mmol/L


(21-32)


 


Anion Gap 9 (6-14) 


 


Blood Urea Nitrogen


 30 mg/dL


(8-26)


 


Creatinine


 1.1 mg/dL


(0.7-1.3)


 


Estimated GFR


(Cockcroft-Gault) 77.9 





 


Glucose Level


 127 mg/dL


(70-99)


 


Calcium Level


 6.8 mg/dL


(8.5-10.1)








Medications





Current Medications


Albuterol/ Ipratropium (Duoneb) 3 ml 1X  ONCE NEB  Last administered on 

2/18/21at 10:45;  Start 2/18/21 at 10:45;  Stop 2/18/21 at 10:46;  Status DC


Methylprednisolone Sodium Succinate (SOLU-Medrol 125MG VIAL) 125 mg 1X  ONCE IV 

Last administered on 2/18/21at 11:02;  Start 2/18/21 at 10:45;  Stop 2/18/21 at 

10:46;  Status DC


Diltiazem HCl (Cardizem Iv Push) 20 mg 1X  ONCE IVP  Last administered on 

2/18/21at 12:02;  Start 2/18/21 at 12:00;  Stop 2/18/21 at 12:01;  Status DC


Diltiazem HCl 125 mg/Sodium Chloride 125 ml @ 5 mls/hr CONT  PRN IV SEE I/O 

RECORD Last administered on 2/18/21at 12:29;  Start 2/18/21 at 12:15;  Stop 

2/19/21 at 16:00;  Status DC


Furosemide (Lasix) 40 mg 1X  ONCE IVP  Last administered on 2/18/21at 14:00;  

Start 2/18/21 at 13:15;  Stop 2/18/21 at 13:19;  Status DC


Ondansetron HCl (Zofran) 4 mg PRN Q8HRS  PRN IV NAUSEA/VOMITING;  Start 2/18/21 

at 13:15


Acetaminophen (Tylenol) 650 mg PRN Q4HRS  PRN PO FEVER > 100.3'F;  Start 2/18/21

at 13:15


Apixaban (Eliquis) 2.5 mg BID PO  Last administered on 2/20/21at 08:10;  Start 

2/18/21 at 21:00


Aspirin (Ecotrin) 81 mg DAILYWBKFT PO  Last administered on 2/20/21at 08:11;  

Start 2/19/21 at 08:00


Atorvastatin Calcium (Lipitor) 40 mg QHS PO  Last administered on 2/19/21at 

21:16;  Start 2/18/21 at 21:00


Calcium/Vitamin D (Oscal D 500mg/ 200uts) 2 tab BIDWMEALS PO  Last administered 

on 2/20/21at 08:11;  Start 2/19/21 at 08:00


Docusate Sodium (Colace) 100 mg PRN BID  PRN PO CONSTIPATION;  Start 2/18/21 at 

19:00


Lactobacillus Rhamnosus (Culturelle) 1 cap BID PO  Last administered on 

2/20/21at 08:11;  Start 2/18/21 at 21:00


Sacubitril/ Valsartan (Entresto 24 Mg-26 Mg) 1 tab BID PO  Last administered on 

2/20/21at 08:10;  Start 2/18/21 at 21:00


Spironolactone (Aldactone) 25 mg DAILY PO  Last administered on 2/20/21at 08:12;

 Start 2/19/21 at 09:00


Tamsulosin HCl (Flomax) 0.4 mg BID PO  Last administered on 2/20/21at 08:11;  

Start 2/18/21 at 21:00


Carvedilol (Coreg) 12.5 mg BIDWMEALS PO  Last administered on 2/19/21at 08:36;  

Start 2/19/21 at 08:00;  Stop 2/19/21 at 16:00;  Status DC


Magnesium Oxide (Magnesium Oxide) 400 mg DAILY PO  Last administered on 

2/20/21at 08:10;  Start 2/19/21 at 09:00


Psyllium Hydrophilic Mucilloid (Metamucil Fiber Packet) 1 pkt QHS PO  Last 

administered on 2/19/21at 21:16;  Start 2/18/21 at 21:00


Olanzapine (ZyPREXA ZYDIS) 5 mg PRN BID  PRN PO ANXIETY / AGITATION;  Start 

2/18/21 at 19:00


Info (Anti-Coagulation Monitoring By Pharmacy) 1 each PRN DAILY  PRN [facility] SEE 

COMMENTS;  Start 2/18/21 at 19:00


Metoprolol Succinate (Toprol Xl) 50 mg DAILY PO  Last administered on 2/20/21at 

08:11;  Start 2/19/21 at 16:00


Furosemide (Lasix) 40 mg 1X  ONCE IVP  Last administered on 2/19/21at 16:57;  

Start 2/19/21 at 16:00;  Stop 2/19/21 at 16:03;  Status DC


Potassium Chloride (Klor-Con) 20 meq 1X  ONCE PO  Last administered on 2/19/21at

16:57;  Start 2/19/21 at 16:00;  Stop 2/19/21 at 16:03;  Status DC





Active Scripts


Active


Coreg (Carvedilol) 25 Mg Tablet 12.5 Mg PO BIDWMEALS 30 Days


Lasix (Furosemide) 40 Mg Tablet 1 Tab PO DAILY 30 Days


Entresto 24 mg-26 mg Tablet (Sacubitril/Valsartan) 1 Each Tablet 1 Tab PO BID 30

Days


Amlodipine Besylate 5 Mg Tablet 5 Mg PO DAILY 30 Days


Culturelle (Lactobacillus Rhamnosus Gg) 1 Each Cap.sprink 1 Cap PO BID 30 Days


Oysco 500+D Tablet (Calcium Carbonate/Vitamin D3) 1 Each Tablet 2 Tab PO 

BIDWMEALS 30 Days


Aldactone (Spironolactone) 25 Mg Tablet 25 Mg PO DAILY 30 Days


Dok (Docusate Sodium) 100 Mg Capsule 100 Mg PO PRN BID PRN 14 Days


Aspirin Ec (Aspirin) 81 Mg Tablet.dr 81 Mg PO DAILYWBKFT 30 Days


Reported


Eliquis (Apixaban) 2.5 Mg Tablet 2.5 Mg PO BID


Flomax (Tamsulosin Hcl) 0.4 Mg Cap.er.24h 1 Cap PO BID


Atorvastatin Calcium 40 Mg Tablet 1 Tab PO QHS


Magnesium (Magnesium Oxide) 400 Mg Capsule 1 Cap PO DAILY 30 Days


Vitals/I & O





Vital Sign - Last 24 Hours








 2/19/21 2/19/21 2/19/21 2/19/21





 16:58 19:40 20:15 21:15


 


Temp  97.5  





  97.5  


 


Pulse 65 62  62


 


Resp  20  


 


B/P (MAP) 114/74 129/78 (95)  129/78


 


Pulse Ox  98  


 


O2 Delivery  Nasal Cannula Nasal Cannula 


 


O2 Flow Rate  2.0 2.0 


 


    





    





 2/19/21 2/20/21 2/20/21 2/20/21





 23:10 03:38 07:00 08:00


 


Temp 97.5 97.5 97.0 





 97.5 97.5 97.0 


 


Pulse 68 72 64 


 


Resp 18 16 21 


 


B/P (MAP) 133/79 (97) 137/76 (96) 131/74 (93) 


 


Pulse Ox 96 95 96 


 


O2 Delivery Nasal Cannula Nasal Cannula BiPAP/CPAP Nasal Cannula


 


O2 Flow Rate 2.0 2.0 2.0 2.0


 


    





    





 2/20/21 2/20/21 2/20/21 





 08:10 08:11 11:00 


 


Temp   96.2 





   96.2 


 


Pulse 72 72 72 


 


Resp   20 


 


B/P (MAP) 131/74 131/74 103/73 (83) 


 


Pulse Ox   100 


 


O2 Delivery   Nasal Cannula 


 


O2 Flow Rate   2.0 














Intake and Output   


 


 2/19/21 2/19/21 2/20/21





 15:00 23:00 07:00


 


Intake Total   300 ml


 


Output Total 200 ml  900 ml


 


Balance -200 ml  -600 ml











Justifications for Admission


Other Justification














JASON RUSSO MD            Feb 20, 2021 16:49

## 2021-02-21 VITALS — DIASTOLIC BLOOD PRESSURE: 63 MMHG | SYSTOLIC BLOOD PRESSURE: 134 MMHG

## 2021-02-21 VITALS — DIASTOLIC BLOOD PRESSURE: 76 MMHG | SYSTOLIC BLOOD PRESSURE: 121 MMHG

## 2021-02-21 VITALS — SYSTOLIC BLOOD PRESSURE: 131 MMHG | DIASTOLIC BLOOD PRESSURE: 73 MMHG

## 2021-02-21 VITALS — DIASTOLIC BLOOD PRESSURE: 60 MMHG | SYSTOLIC BLOOD PRESSURE: 119 MMHG

## 2021-02-21 VITALS — SYSTOLIC BLOOD PRESSURE: 139 MMHG | DIASTOLIC BLOOD PRESSURE: 67 MMHG

## 2021-02-21 VITALS — DIASTOLIC BLOOD PRESSURE: 77 MMHG | SYSTOLIC BLOOD PRESSURE: 131 MMHG

## 2021-02-21 LAB
ANION GAP SERPL CALC-SCNC: 7 MMOL/L (ref 6–14)
BUN SERPL-MCNC: 28 MG/DL (ref 8–26)
CALCIUM SERPL-MCNC: 6.4 MG/DL (ref 8.5–10.1)
CHLORIDE SERPL-SCNC: 104 MMOL/L (ref 98–107)
CO2 SERPL-SCNC: 27 MMOL/L (ref 21–32)
CREAT SERPL-MCNC: 1.1 MG/DL (ref 0.7–1.3)
GFR SERPLBLD BASED ON 1.73 SQ M-ARVRAT: 77.9 ML/MIN
GLUCOSE SERPL-MCNC: 120 MG/DL (ref 70–99)
POTASSIUM SERPL-SCNC: 3.7 MMOL/L (ref 3.5–5.1)
SODIUM SERPL-SCNC: 138 MMOL/L (ref 136–145)

## 2021-02-21 RX ADMIN — CALCIUM CARBONATE-VITAMIN D TAB 500 MG-200 UNIT SCH TAB: 500-200 TAB at 17:47

## 2021-02-21 RX ADMIN — MAGNESIUM OXIDE TAB 400 MG (241.3 MG ELEMENTAL MG) SCH MG: 400 (241.3 MG) TAB at 09:01

## 2021-02-21 RX ADMIN — SACUBITRIL AND VALSARTAN SCH TAB: 24; 26 TABLET, FILM COATED ORAL at 09:01

## 2021-02-21 RX ADMIN — PSYLLIUM HUSK SCH PKT: 3.4 POWDER ORAL at 20:53

## 2021-02-21 RX ADMIN — TAMSULOSIN HYDROCHLORIDE SCH MG: 0.4 CAPSULE ORAL at 09:01

## 2021-02-21 RX ADMIN — SACUBITRIL AND VALSARTAN SCH TAB: 24; 26 TABLET, FILM COATED ORAL at 20:53

## 2021-02-21 RX ADMIN — CALCIUM CARBONATE-VITAMIN D TAB 500 MG-200 UNIT SCH TAB: 500-200 TAB at 09:00

## 2021-02-21 RX ADMIN — APIXABAN SCH MG: 2.5 TABLET, FILM COATED ORAL at 09:01

## 2021-02-21 RX ADMIN — ASPIRIN SCH MG: 81 TABLET, COATED ORAL at 09:00

## 2021-02-21 RX ADMIN — TAMSULOSIN HYDROCHLORIDE SCH MG: 0.4 CAPSULE ORAL at 20:53

## 2021-02-21 RX ADMIN — APIXABAN SCH MG: 2.5 TABLET, FILM COATED ORAL at 20:53

## 2021-02-21 RX ADMIN — Medication SCH CAP: at 20:53

## 2021-02-21 RX ADMIN — SPIRONOLACTONE SCH MG: 25 TABLET ORAL at 09:02

## 2021-02-21 RX ADMIN — METOPROLOL SUCCINATE SCH MG: 50 TABLET, EXTENDED RELEASE ORAL at 09:01

## 2021-02-21 RX ADMIN — ATORVASTATIN CALCIUM SCH MG: 40 TABLET, FILM COATED ORAL at 20:53

## 2021-02-21 RX ADMIN — Medication SCH CAP: at 09:00

## 2021-02-21 NOTE — PDOC
PROGRESS NOTES


Date of Service


DATE: 2/21/21 


TIME: 13:28





Subjective


Subjective


Patient seen and examined





Objective


Objective





Vital Signs








  Date Time  Temp Pulse Resp B/P (MAP) Pulse Ox O2 Delivery O2 Flow Rate FiO2


 


2/21/21 11:00 97.7 74 18 119/60 (79) 96 Room Air  





 97.7       


 


2/20/21 20:00       2.0 














Intake and Output 


 


 2/21/21





 07:00


 


Intake Total 120 ml


 


Output Total 150 ml


 


Balance -30 ml


 


 


 


Intake Oral 120 ml


 


Output Urine Total 150 ml











Physical Exam


Abdomen:  Normal bowel sounds


Heart:  Regular rate


General:  mild distress


Lungs:  Other (Mildly decreased breath sounds)





Assessment


Assessment


Problems


Medical Problems:


(1) Atrial fibrillation with rapid ventricular response


Status: Acute  





(2) CHF (congestive heart failure)


Status: Acute  





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  Ejection fraction of 15 to 20%.  

Continue slow improvement.  We will continue present treatment.


CAD s/p PCI/stent to the RCA 2009; CP free. Last [facility] 6/2019 [facility] with moderate 

diffuse CAD


SSS s/p PPM ([employer])


Accelerated hypertension: improved


PAFIB with RVR; rate controlled


Hyperlipidemia


PUI; COVID negative





Comment


Review of Relevant


I have reviewed the following items dao (where applicable) has been applied.


Labs





Laboratory Tests








Test


 2/20/21


04:00 2/21/21


07:05


 


Sodium Level


 141 mmol/L


(136-145) 138 mmol/L


(136-145)


 


Potassium Level


 4.1 mmol/L


(3.5-5.1) 3.7 mmol/L


(3.5-5.1)


 


Chloride Level


 105 mmol/L


() 104 mmol/L


()


 


Carbon Dioxide Level


 27 mmol/L


(21-32) 27 mmol/L


(21-32)


 


Anion Gap 9 (6-14)  7 (6-14) 


 


Blood Urea Nitrogen


 30 mg/dL


(8-26) 28 mg/dL


(8-26)


 


Creatinine


 1.1 mg/dL


(0.7-1.3) 1.1 mg/dL


(0.7-1.3)


 


Estimated GFR


(Cockcroft-Gault) 77.9 


 77.9 





 


Glucose Level


 127 mg/dL


(70-99) 120 mg/dL


(70-99)


 


Calcium Level


 6.8 mg/dL


(8.5-10.1) 6.4 mg/dL


(8.5-10.1)








Laboratory Tests








Test


 2/21/21


07:05


 


Sodium Level


 138 mmol/L


(136-145)


 


Potassium Level


 3.7 mmol/L


(3.5-5.1)


 


Chloride Level


 104 mmol/L


()


 


Carbon Dioxide Level


 27 mmol/L


(21-32)


 


Anion Gap 7 (6-14) 


 


Blood Urea Nitrogen


 28 mg/dL


(8-26)


 


Creatinine


 1.1 mg/dL


(0.7-1.3)


 


Estimated GFR


(Cockcroft-Gault) 77.9 





 


Glucose Level


 120 mg/dL


(70-99)


 


Calcium Level


 6.4 mg/dL


(8.5-10.1)








Medications





Current Medications


Albuterol/ Ipratropium (Duoneb) 3 ml 1X  ONCE NEB  Last administered on 

2/18/21at 10:45;  Start 2/18/21 at 10:45;  Stop 2/18/21 at 10:46;  Status DC


Methylprednisolone Sodium Succinate (SOLU-Medrol 125MG VIAL) 125 mg 1X  ONCE IV 

Last administered on 2/18/21at 11:02;  Start 2/18/21 at 10:45;  Stop 2/18/21 at 

10:46;  Status DC


Diltiazem HCl (Cardizem Iv Push) 20 mg 1X  ONCE IVP  Last administered on 

2/18/21at 12:02;  Start 2/18/21 at 12:00;  Stop 2/18/21 at 12:01;  Status DC


Diltiazem HCl 125 mg/Sodium Chloride 125 ml @ 5 mls/hr CONT  PRN IV SEE I/O 

RECORD Last administered on 2/18/21at 12:29;  Start 2/18/21 at 12:15;  Stop 

2/19/21 at 16:00;  Status DC


Furosemide (Lasix) 40 mg 1X  ONCE IVP  Last administered on 2/18/21at 14:00;  

Start 2/18/21 at 13:15;  Stop 2/18/21 at 13:19;  Status DC


Ondansetron HCl (Zofran) 4 mg PRN Q8HRS  PRN IV NAUSEA/VOMITING;  Start 2/18/21 

at 13:15


Acetaminophen (Tylenol) 650 mg PRN Q4HRS  PRN PO FEVER > 100.3'F;  Start 2/18/21

at 13:15


Apixaban (Eliquis) 2.5 mg BID PO  Last administered on 2/21/21at 09:01;  Start 

2/18/21 at 21:00


Aspirin (Ecotrin) 81 mg DAILYWBKFT PO  Last administered on 2/21/21at 09:00;  

Start 2/19/21 at 08:00


Atorvastatin Calcium (Lipitor) 40 mg QHS PO  Last administered on 2/20/21at 

22:41;  Start 2/18/21 at 21:00


Calcium/Vitamin D (Oscal D 500mg/ 200uts) 2 tab BIDWMEALS PO  Last administered 

on 2/21/21at 09:00;  Start 2/19/21 at 08:00


Docusate Sodium (Colace) 100 mg PRN BID  PRN PO CONSTIPATION;  Start 2/18/21 at 

19:00


Lactobacillus Rhamnosus (Culturelle) 1 cap BID PO  Last administered on 

2/21/21at 09:00;  Start 2/18/21 at 21:00


Sacubitril/ Valsartan (Entresto 24 Mg-26 Mg) 1 tab BID PO  Last administered on 

2/21/21at 09:01;  Start 2/18/21 at 21:00


Spironolactone (Aldactone) 25 mg DAILY PO  Last administered on 2/21/21at 09:02;

 Start 2/19/21 at 09:00


Tamsulosin HCl (Flomax) 0.4 mg BID PO  Last administered on 2/21/21at 09:01;  

Start 2/18/21 at 21:00


Carvedilol (Coreg) 12.5 mg BIDWMEALS PO  Last administered on 2/19/21at 08:36;  

Start 2/19/21 at 08:00;  Stop 2/19/21 at 16:00;  Status DC


Magnesium Oxide (Magnesium Oxide) 400 mg DAILY PO  Last administered on 

2/21/21at 09:01;  Start 2/19/21 at 09:00


Psyllium Hydrophilic Mucilloid (Metamucil Fiber Packet) 1 pkt QHS PO  Last 

administered on 2/20/21at 22:41;  Start 2/18/21 at 21:00


Olanzapine (ZyPREXA ZYDIS) 5 mg PRN BID  PRN PO ANXIETY / AGITATION;  Start 

2/18/21 at 19:00


Info (Anti-Coagulation Monitoring By Pharmacy) 1 each PRN DAILY  PRN [facility] SEE 

COMMENTS Last administered on 2/21/21at 11:55;  Start 2/18/21 at 19:00


Metoprolol Succinate (Toprol Xl) 50 mg DAILY PO  Last administered on 2/21/21at 

09:01;  Start 2/19/21 at 16:00


Furosemide (Lasix) 40 mg 1X  ONCE IVP  Last administered on 2/19/21at 16:57;  St

art 2/19/21 at 16:00;  Stop 2/19/21 at 16:03;  Status DC


Potassium Chloride (Klor-Con) 20 meq 1X  ONCE PO  Last administered on 2/19/21at

16:57;  Start 2/19/21 at 16:00;  Stop 2/19/21 at 16:03;  Status DC





Active Scripts


Active


Coreg (Carvedilol) 25 Mg Tablet 12.5 Mg PO BIDWMEALS 30 Days


Lasix (Furosemide) 40 Mg Tablet 1 Tab PO DAILY 30 Days


Entresto 24 mg-26 mg Tablet (Sacubitril/Valsartan) 1 Each Tablet 1 Tab PO BID 30

Days


Amlodipine Besylate 5 Mg Tablet 5 Mg PO DAILY 30 Days


Culturelle (Lactobacillus Rhamnosus Gg) 1 Each Cap.sprink 1 Cap PO BID 30 Days


Oysco 500+D Tablet (Calcium Carbonate/Vitamin D3) 1 Each Tablet 2 Tab PO 

BIDWMEALS 30 Days


Aldactone (Spironolactone) 25 Mg Tablet 25 Mg PO DAILY 30 Days


Dok (Docusate Sodium) 100 Mg Capsule 100 Mg PO PRN BID PRN 14 Days


Aspirin Ec (Aspirin) 81 Mg Tablet.dr 81 Mg PO DAILYWBKFT 30 Days


Reported


Eliquis (Apixaban) 2.5 Mg Tablet 2.5 Mg PO BID


Flomax (Tamsulosin Hcl) 0.4 Mg Cap.er.24h 1 Cap PO BID


Atorvastatin Calcium 40 Mg Tablet 1 Tab PO QHS


Magnesium (Magnesium Oxide) 400 Mg Capsule 1 Cap PO DAILY 30 Days


Vitals/I & O





Vital Sign - Last 24 Hours








 2/20/21 2/20/21 2/20/21 2/20/21





 15:00 19:35 20:00 22:42


 


Temp 98.1 97.9  





 98.1 97.9  


 


Pulse 71 68  68


 


Resp 20 22  


 


B/P (MAP) 134/67 (89) 125/56 (79)  125/56


 


Pulse Ox 98 94  


 


O2 Delivery Nasal Cannula Room Air Nasal Cannula 


 


O2 Flow Rate 2.0  2.0 


 


    





    





 2/20/21 2/21/21 2/21/21 2/21/21





 23:20 03:30 07:00 08:00


 


Temp 97.7 98.0 97.7 





 97.7 98.0 97.7 


 


Pulse 71 79 82 


 


Resp 21 21 20 


 


B/P (MAP) 130/79 (96) 134/63 (86) 139/67 (91) 


 


Pulse Ox 94 95 94 


 


O2 Delivery Room Air Room Air Room Air Room Air


 


    





    





 2/21/21 2/21/21 2/21/21 





 09:01 09:01 11:00 


 


Temp   97.7 





   97.7 


 


Pulse 82 82 74 


 


Resp   18 


 


B/P (MAP) 139/67 139/67 119/60 (79) 


 


Pulse Ox   96 


 


O2 Delivery   Room Air 














Intake and Output   


 


 2/20/21 2/20/21 2/21/21





 15:00 23:00 07:00


 


Intake Total   120 ml


 


Output Total   150 ml


 


Balance   -30 ml











Justifications for Admission


Other Justification














JASON RUSSO MD            Feb 21, 2021 13:30

## 2021-02-21 NOTE — PDOC
PROGRESS NOTES


Date of Service:


DATE: 2/21/21 


TIME: 14:37





Chief Complaint


Chief Complaint


A/P:


Acute on chronic diastolic systolic CHF with EF 15% - IV lasix, looks in florid 

CHF. Cardiology consulted


Ischemic cardiomyopathy


CAD s/p PCI/stent to the RCA 2009 - coronary angiogram 6/2019 [facility] with moderate 

diffuse CAD


SSS s/p PPM ([employer]) - needs interrogation


HTN - cont home meds


PAFIB - afib with RVR currently. Is on eliquis


Hyperlipidemia - cont statin





FEN - Cardiac diet


PPX - eliquis


FULL CODE


Dispo - inpatient





History of Present Illness


History of Present Illness








2/21,  str better,   more comfortable,  CV following, plan DC in AM








Mr Barber is an 80-year-old male w/ PMHx SSS s/p PPM, HTN, diastolic CHF, 

smoker presents to the ED c/o progressive shortness of air that has progressed 

over the past month. He was going to a cardiology appointment prior to this, but

states he forgot his paperwork and came to ED as his shortness of breath was 

unbearable. Associated PND, orthopnea and LE edema. He notes decreased UOP and 

has been compliant with meds. No new cough or sick exposures.  He further denies

any nausea, diaphoresis or feeling of being lightheaded. 





Chest radiograph concerning for pulmonary edema with large right pleural 

effusion with lateral wall loculations previously noted


EKG was irregularly irregular rhythm no significant P waves found. BNP 53144


Started on IV lasix and diltiazem infusion for afib with RVR and admitted for 

further care





2/19: Placed on 2 L of O2 overnight.  He is breathing more comfortably.  Eating 

breakfast.  Labs stable. Off diltiazem, changed coreg to metoprolol





Off O2 currently.  Increased urine output with Lasix dosing yesterday.  

Breathing comfortably.  Eating breakfast.  Labs stable.  Tolerating metoprolol 

well with heart rate in 60s to 80s.  Still in A. fib.  Transferring to [facility] 

today.





Vitals


Vitals





Vital Signs








  Date Time  Temp Pulse Resp B/P (MAP) Pulse Ox O2 Delivery O2 Flow Rate FiO2


 


2/21/21 11:00 97.7 74 18 119/60 (79) 96 Room Air  





 97.7       


 


2/20/21 20:00       2.0 











Physical Exam


General:  mild distress


Heart:  Regular rate


Lungs:  Clear


Abdomen:  Normal bowel sounds


Extremities:  No clubbing, No cyanosis, No edema, Normal pulses, No 

tenderness/swelling


Skin:  No rashes, No breakdown, No significant lesion





Labs


LABS





Laboratory Tests








Test


 2/21/21


07:05


 


Sodium Level


 138 mmol/L


(136-145)


 


Potassium Level


 3.7 mmol/L


(3.5-5.1)


 


Chloride Level


 104 mmol/L


()


 


Carbon Dioxide Level


 27 mmol/L


(21-32)


 


Anion Gap 7 (6-14) 


 


Blood Urea Nitrogen


 28 mg/dL


(8-26)


 


Creatinine


 1.1 mg/dL


(0.7-1.3)


 


Estimated GFR


(Cockcroft-Gault) 77.9 





 


Glucose Level


 120 mg/dL


(70-99)


 


Calcium Level


 6.4 mg/dL


(8.5-10.1)











Assessment and Plan


Assessmemt and Plan


Problems


Medical Problems:


(1) Atrial fibrillation with rapid ventricular response


Status: Acute  





(2) CHF (congestive heart failure)


Status: Acute  











Comment


Review of Relevant


I have reviewed the following items dao (where applicable) has been applied.


Labs





Laboratory Tests








Test


 2/20/21


04:00 2/21/21


07:05


 


Sodium Level


 141 mmol/L


(136-145) 138 mmol/L


(136-145)


 


Potassium Level


 4.1 mmol/L


(3.5-5.1) 3.7 mmol/L


(3.5-5.1)


 


Chloride Level


 105 mmol/L


() 104 mmol/L


()


 


Carbon Dioxide Level


 27 mmol/L


(21-32) 27 mmol/L


(21-32)


 


Anion Gap 9 (6-14)  7 (6-14) 


 


Blood Urea Nitrogen


 30 mg/dL


(8-26) 28 mg/dL


(8-26)


 


Creatinine


 1.1 mg/dL


(0.7-1.3) 1.1 mg/dL


(0.7-1.3)


 


Estimated GFR


(Cockcroft-Gault) 77.9 


 77.9 





 


Glucose Level


 127 mg/dL


(70-99) 120 mg/dL


(70-99)


 


Calcium Level


 6.8 mg/dL


(8.5-10.1) 6.4 mg/dL


(8.5-10.1)








Laboratory Tests








Test


 2/21/21


07:05


 


Sodium Level


 138 mmol/L


(136-145)


 


Potassium Level


 3.7 mmol/L


(3.5-5.1)


 


Chloride Level


 104 mmol/L


()


 


Carbon Dioxide Level


 27 mmol/L


(21-32)


 


Anion Gap 7 (6-14) 


 


Blood Urea Nitrogen


 28 mg/dL


(8-26)


 


Creatinine


 1.1 mg/dL


(0.7-1.3)


 


Estimated GFR


(Cockcroft-Gault) 77.9 





 


Glucose Level


 120 mg/dL


(70-99)


 


Calcium Level


 6.4 mg/dL


(8.5-10.1)








Medications





Current Medications


Albuterol/ Ipratropium (Duoneb) 3 ml 1X  ONCE NEB  Last administered on 

2/18/21at 10:45;  Start 2/18/21 at 10:45;  Stop 2/18/21 at 10:46;  Status DC


Methylprednisolone Sodium Succinate (SOLU-Medrol 125MG VIAL) 125 mg 1X  ONCE IV 

Last administered on 2/18/21at 11:02;  Start 2/18/21 at 10:45;  Stop 2/18/21 at 

10:46;  Status DC


Diltiazem HCl (Cardizem Iv Push) 20 mg 1X  ONCE IVP  Last administered on 

2/18/21at 12:02;  Start 2/18/21 at 12:00;  Stop 2/18/21 at 12:01;  Status DC


Diltiazem HCl 125 mg/Sodium Chloride 125 ml @ 5 mls/hr CONT  PRN IV SEE I/O 

RECORD Last administered on 2/18/21at 12:29;  Start 2/18/21 at 12:15;  Stop 

2/19/21 at 16:00;  Status DC


Furosemide (Lasix) 40 mg 1X  ONCE IVP  Last administered on 2/18/21at 14:00;  St

art 2/18/21 at 13:15;  Stop 2/18/21 at 13:19;  Status DC


Ondansetron HCl (Zofran) 4 mg PRN Q8HRS  PRN IV NAUSEA/VOMITING;  Start 2/18/21 

at 13:15


Acetaminophen (Tylenol) 650 mg PRN Q4HRS  PRN PO FEVER > 100.3'F;  Start 2/18/21

at 13:15


Apixaban (Eliquis) 2.5 mg BID PO  Last administered on 2/21/21at 09:01;  Start 

2/18/21 at 21:00


Aspirin (Ecotrin) 81 mg DAILYWBKFT PO  Last administered on 2/21/21at 09:00;  

Start 2/19/21 at 08:00


Atorvastatin Calcium (Lipitor) 40 mg QHS PO  Last administered on 2/20/21at 22:

41;  Start 2/18/21 at 21:00


Calcium/Vitamin D (Oscal D 500mg/ 200uts) 2 tab BIDWMEALS PO  Last administered 

on 2/21/21at 09:00;  Start 2/19/21 at 08:00


Docusate Sodium (Colace) 100 mg PRN BID  PRN PO CONSTIPATION;  Start 2/18/21 at 

19:00


Lactobacillus Rhamnosus (Culturelle) 1 cap BID PO  Last administered on 

2/21/21at 09:00;  Start 2/18/21 at 21:00


Sacubitril/ Valsartan (Entresto 24 Mg-26 Mg) 1 tab BID PO  Last administered on 

2/21/21at 09:01;  Start 2/18/21 at 21:00


Spironolactone (Aldactone) 25 mg DAILY PO  Last administered on 2/21/21at 09:02;

 Start 2/19/21 at 09:00


Tamsulosin HCl (Flomax) 0.4 mg BID PO  Last administered on 2/21/21at 09:01;  

Start 2/18/21 at 21:00


Carvedilol (Coreg) 12.5 mg BIDWMEALS PO  Last administered on 2/19/21at 08:36;  

Start 2/19/21 at 08:00;  Stop 2/19/21 at 16:00;  Status DC


Magnesium Oxide (Magnesium Oxide) 400 mg DAILY PO  Last administered on 2/21/21

at 09:01;  Start 2/19/21 at 09:00


Psyllium Hydrophilic Mucilloid (Metamucil Fiber Packet) 1 pkt QHS PO  Last 

administered on 2/20/21at 22:41;  Start 2/18/21 at 21:00


Olanzapine (ZyPREXA ZYDIS) 5 mg PRN BID  PRN PO ANXIETY / AGITATION;  Start 

2/18/21 at 19:00


Info (Anti-Coagulation Monitoring By Pharmacy) 1 each PRN DAILY  PRN MC SEE 

COMMENTS Last administered on 2/21/21at 11:55;  Start 2/18/21 at 19:00


Metoprolol Succinate (Toprol Xl) 50 mg DAILY PO  Last administered on 2/21/21at 

09:01;  Start 2/19/21 at 16:00


Furosemide (Lasix) 40 mg 1X  ONCE IVP  Last administered on 2/19/21at 16:57;  

Start 2/19/21 at 16:00;  Stop 2/19/21 at 16:03;  Status DC


Potassium Chloride (Klor-Con) 20 meq 1X  ONCE PO  Last administered on 2/19/21at

16:57;  Start 2/19/21 at 16:00;  Stop 2/19/21 at 16:03;  Status DC





Active Scripts


Active


Coreg (Carvedilol) 25 Mg Tablet 12.5 Mg PO BIDWMEALS 30 Days


Lasix (Furosemide) 40 Mg Tablet 1 Tab PO DAILY 30 Days


Entresto 24 mg-26 mg Tablet (Sacubitril/Valsartan) 1 Each Tablet 1 Tab PO BID 30

Days


Amlodipine Besylate 5 Mg Tablet 5 Mg PO DAILY 30 Days


Culturelle (Lactobacillus Rhamnosus Gg) 1 Each Cap.sprink 1 Cap PO BID 30 Days


Oysco 500+D Tablet (Calcium Carbonate/Vitamin D3) 1 Each Tablet 2 Tab PO 

BIDWMEALS 30 Days


Aldactone (Spironolactone) 25 Mg Tablet 25 Mg PO DAILY 30 Days


Dok (Docusate Sodium) 100 Mg Capsule 100 Mg PO PRN BID PRN 14 Days


Aspirin Ec (Aspirin) 81 Mg Tablet.dr 81 Mg PO DAILYWBKFT 30 Days


Reported


Eliquis (Apixaban) 2.5 Mg Tablet 2.5 Mg PO BID


Flomax (Tamsulosin Hcl) 0.4 Mg Cap.er.24h 1 Cap PO BID


Atorvastatin Calcium 40 Mg Tablet 1 Tab PO QHS


Magnesium (Magnesium Oxide) 400 Mg Capsule 1 Cap PO DAILY 30 Days


Vitals/I & O





Vital Sign - Last 24 Hours








 2/20/21 2/20/21 2/20/21 2/20/21





 15:00 19:35 20:00 22:42


 


Temp 98.1 97.9  





 98.1 97.9  


 


Pulse 71 68  68


 


Resp 20 22  


 


B/P (MAP) 134/67 (89) 125/56 (79)  125/56


 


Pulse Ox 98 94  


 


O2 Delivery Nasal Cannula Room Air Nasal Cannula 


 


O2 Flow Rate 2.0  2.0 


 


    





    





 2/20/21 2/21/21 2/21/21 2/21/21





 23:20 03:30 07:00 08:00


 


Temp 97.7 98.0 97.7 





 97.7 98.0 97.7 


 


Pulse 71 79 82 


 


Resp 21 21 20 


 


B/P (MAP) 130/79 (96) 134/63 (86) 139/67 (91) 


 


Pulse Ox 94 95 94 


 


O2 Delivery Room Air Room Air Room Air Room Air


 


    





    





 2/21/21 2/21/21 2/21/21 





 09:01 09:01 11:00 


 


Temp   97.7 





   97.7 


 


Pulse 82 82 74 


 


Resp   18 


 


B/P (MAP) 139/67 139/67 119/60 (79) 


 


Pulse Ox   96 


 


O2 Delivery   Room Air 














Intake and Output   


 


 2/20/21 2/20/21 2/21/21





 15:00 23:00 07:00


 


Intake Total   120 ml


 


Output Total   150 ml


 


Balance   -30 ml











Justicifation of Admission Dx:


Justifications for Admission:


Justification of Admission Dx:  Yes











EDISON PRECIADO MD                 Feb 21, 2021 14:37

## 2021-02-22 VITALS — SYSTOLIC BLOOD PRESSURE: 110 MMHG | DIASTOLIC BLOOD PRESSURE: 58 MMHG

## 2021-02-22 VITALS — SYSTOLIC BLOOD PRESSURE: 147 MMHG | DIASTOLIC BLOOD PRESSURE: 76 MMHG

## 2021-02-22 VITALS — SYSTOLIC BLOOD PRESSURE: 123 MMHG | DIASTOLIC BLOOD PRESSURE: 66 MMHG

## 2021-02-22 VITALS — SYSTOLIC BLOOD PRESSURE: 120 MMHG | DIASTOLIC BLOOD PRESSURE: 55 MMHG

## 2021-02-22 RX ADMIN — SPIRONOLACTONE SCH MG: 25 TABLET ORAL at 08:27

## 2021-02-22 RX ADMIN — Medication SCH CAP: at 08:27

## 2021-02-22 RX ADMIN — CALCIUM CARBONATE-VITAMIN D TAB 500 MG-200 UNIT SCH TAB: 500-200 TAB at 08:27

## 2021-02-22 RX ADMIN — SACUBITRIL AND VALSARTAN SCH TAB: 24; 26 TABLET, FILM COATED ORAL at 08:27

## 2021-02-22 RX ADMIN — MAGNESIUM OXIDE TAB 400 MG (241.3 MG ELEMENTAL MG) SCH MG: 400 (241.3 MG) TAB at 08:27

## 2021-02-22 RX ADMIN — APIXABAN SCH MG: 2.5 TABLET, FILM COATED ORAL at 08:27

## 2021-02-22 RX ADMIN — TAMSULOSIN HYDROCHLORIDE SCH MG: 0.4 CAPSULE ORAL at 08:28

## 2021-02-22 RX ADMIN — METOPROLOL SUCCINATE SCH MG: 50 TABLET, EXTENDED RELEASE ORAL at 08:27

## 2021-02-22 RX ADMIN — ASPIRIN SCH MG: 81 TABLET, COATED ORAL at 08:28

## 2021-02-22 NOTE — SNU/HH DC
DISCHARGE WITH HOME HEALTH


DISCHARGE INFORMATION:


Discharge Date:  Feb 22, 2021


Final Diagnosis:


Problems


Medical Problems:


(1) Atrial fibrillation with rapid ventricular response


Status: Acute  





(2) CHF (congestive heart failure)


Status: Acute  








Condition on Discharge:  Stable





CODE STATUS:


Code Status:  Full





HOME HEALTH:


Face to Face:


I certify this patient is under my care and that I, or a nurse practitioner or 

physician's assistant working with me, had a face to face encounter that meets 

the physician face to face encounter requirements with this patient on [].


Medical Complications:  CHF


RN For Eval/Treatment:  Yes


Physical Therapy For:  Evalulation/Treatment


Occupational Therapy For:  Evaluation/Treatment


Home Health Aide For:  Self-care


Pt Meets Homebound Status:  Poor coordination w/ amb., Extreme weakness w/ amb.,

 Limited distance walking





POST DISCHARGE ORDERS:


Activity Instructions for Disc:  Activity as tolerated


Weight Bearing Status after Di:  As tolerated


DIET AFTER DISCHARGE:  Cardiac


Wound/Incision Care:  No wound care needed





CHECKS AFTER DISCHARGE:


Checks after discharge:  Check blood press - daily, Check your Temp as needed, 

Weigh Yourself Daily





FOLLOW-UP:


Follow up with:  PCP within 2 weeks of discharge


Follow Up With:  Cardiology as scheduled


DC TO SNF LABS:  CBC, CMP





TREATMENT/EQUIPMENT ORDERS:


Adaptive Equipment Issued:  None, Front wheeled walker





CERTIFICATION STATEMENT:


Certification Statement:


Certification Statement: Based on the above finding, I certify that this patient

 is confined to the home and needs intermittent skilled nursing care, physical 

therapy and/or speech therapy, or continues to need occupational therapy.~ This 

patient is under my care, and I have initiated the establishment of the plan of 

care.~ This patient will be followed by myself or a community physician who will

 periodically review the plan of care.


Home Meds


Active Scripts


Carvedilol (COREG) 25 Mg Tablet, 12.5 MG PO BIDWMEALS for HTN for 30 Days, #60 

TAB 2 Refills


   Prov:JASE ABREU APRN         12/31/20


Furosemide (LASIX) 40 Mg Tablet, 1 TAB PO DAILY for CHF for 30 Days, #30 TAB 2 

Refills


   Prov:JASE ABREU APRN         12/31/20


Sacubitril/Valsartan (Entresto 24 mg-26 mg Tablet) 1 Each Tablet, 1 TAB PO BID 

for CHF for 30 Days, #60 TAB 1 Refill


   Prov:JASE ABREU APRN         12/31/20


Amlodipine Besylate (AMLODIPINE BESYLATE) 5 Mg Tablet, 5 MG PO DAILY for HTN for

 30 Days, #30 TAB 2 Refills


   Prov:JASE ABREU APRN         12/31/20


Lactobacillus Rhamnosus Gg (CULTURELLE) 1 Each Cap.sprink, 1 CAP PO BID for 

supplement for 30 Days, #60 CAP


   Prov:HERMINIO WILKERSON MD         12/31/20


Calcium Carbonate/Vitamin D3 (OYSCO 500+D TABLET) 1 Each Tablet, 2 TAB PO 

BIDWMEALS for bone health for 30 Days, #120 TAB


   Prov:HERMINIO WILKERSON MD         12/31/20


Spironolactone (ALDACTONE) 25 Mg Tablet, 25 MG PO DAILY for heart for 30 Days, 

#30 TAB


   Prov:HERMINIO WILKERSON MD         12/31/20


Docusate Sodium (DOK) 100 Mg Capsule, 100 MG PO PRN BID PRN for CONSTIPATION for

 14 Days, #30 CAP


   Prov:HERMINIO WILKERSON MD         1/9/20


Aspirin (ASPIRIN EC) 81 Mg Tablet.dr, 81 MG PO DAILYWBKFT for HEART HEALTH for 

30 Days, #30 TAB.SR


   Prov:HERMINIO WILKERSON MD         1/9/20


Reported Medications


Apixaban (ELIQUIS) 2.5 Mg Tablet, 2.5 MG PO BID for f, TAB


   1/4/20


Tamsulosin Hcl (FLOMAX) 0.4 Mg Cap.er.24h, 1 CAP PO BID for bph, #30 CAP 11 

Refills


   1/4/20


Atorvastatin Calcium (ATORVASTATIN CALCIUM) 40 Mg Tablet, 1 TAB PO QHS for  HLD,

 #90 TAB 3 Refills


   1/4/20


Magnesium Oxide (MAGNESIUM) 400 Mg Capsule, 1 CAP PO DAILY for supp for 30 Days,

 #30 CAP 0 Refills


   1/4/20











ALONDRA SOLIS MD                    Feb 22, 2021 14:09 cardiac stent/yes(specify)

## 2021-02-22 NOTE — NUR
SS following up with discharge planning. [facility] met with pt and willing to 
accept pt back on services. Pt reporting that he will not be home bound and drives and has 
errands everyday. Pt declining home healthcare at this time and reporting that he plans to 
be out and driving daily. Pt's RN and physician notified.

## 2021-02-22 NOTE — NUR
SS following up with discharge planning. SS reviewed pt chart and discussed with pt RN. Pt 
is currently on room air. COVDI19 negative. Pt recommended home with home healthcare today. 
SS met with pt to discuss discharge planning and home healthcare. Pt agreeable to home 
healthcare. Pt reported that he had [facility], [phone]; fax [phone], 
and would like to resume services. Pt's RN notified. SS will continue to follow for 
discharge planning.

## 2021-02-22 NOTE — NUR
Discharge Note:



ABDIEL GILBERT [facility]



Discharge instructions and discharge home medications reviewed with Patient and a copy 
given. All questions have been answered and understanding verbalized. 



The following instructions and handouts were given: Atrial fibrillation and heart failure



Discontinued lines and drains: Peripheral IV intact.



Patient discharged to Home w/services with Spouse via Wheelchair

## 2021-02-22 NOTE — PDOC
[facility] PROGRESS NOTE


Date of Service


DOS:


DATE: 2/22/21 


TIME: 13:29





Chief Complaint


Chief Complaint


A/P:


Acute on chronic diastolic systolic CHF with EF 15% - IV lasix, looks in florid 

CHF. Cardiology consulted


Ischemic cardiomyopathy


CAD s/p PCI/stent to the RCA 2009 - coronary angiogram 6/2019 [facility] with moderate 

diffuse CAD


SSS s/p PPM ([employer]) - needs interrogation


HTN - cont home meds


PAFIB - afib with RVR currently. Is on eliquis


Hyperlipidemia - cont statin





FEN - Cardiac diet


PPX - eliquis


FULL CODE


Dispo - inpatient





History of Present Illness


History of Present Illness


2/22/2021


Improvement in his symptoms.  Patient is willing to go home with home health, 

but has stated that he will be out during the day most the time.  We have 

emphasized that the patient will need to be home with home health services to 

arrive.  We will continue current management for his CHF.  Pending further 

cardiology evaluation before absolute discharge.  Patient's chart, labs, images 

were reviewed and discussed with RN





2/21,  str better,   more comfortable,  CV following, plan DC in AM








Mr Braber is an 80-year-old male w/ PMHx SSS s/p PPM, HTN, diastolic CHF, 

smoker presents to the ED c/o progressive shortness of air that has progressed 

over the past month. He was going to a cardiology appointment prior to this, but

states he forgot his paperwork and came to ED as his shortness of breath was 

unbearable. Associated PND, orthopnea and LE edema. He notes decreased UOP and 

has been compliant with meds. No new cough or sick exposures.  He further denies

any nausea, diaphoresis or feeling of being lightheaded. 





Chest radiograph concerning for pulmonary edema with large right pleural 

effusion with lateral wall loculations previously noted


EKG was irregularly irregular rhythm no significant P waves found. BNP 42034


Started on IV lasix and diltiazem infusion for afib with RVR and admitted for 

further care





2/19: Placed on 2 L of O2 overnight.  He is breathing more comfortably.  Eating 

breakfast.  Labs stable. Off diltiazem, changed coreg to metoprolol





Off O2 currently.  Increased urine output with Lasix dosing yesterday.  

Breathing comfortably.  Eating breakfast.  Labs stable.  Tolerating metoprolol 

well with heart rate in 60s to 80s.  Still in A. fib.  Transferring to [facility] 

today.





Vitals/I&O


Vitals/I&O:





                                   Vital Signs








  Date Time  Temp Pulse Resp B/P (MAP) Pulse Ox O2 Delivery O2 Flow Rate FiO2


 


2/22/21 11:11 98.0 78 18 123/66 (85) 95 Room Air  





 98.0       














                                    I & O   


 


 2/21/21 2/21/21 2/22/21





 15:00 23:00 07:00


 


Intake Total 525 ml 300 ml 250 ml


 


Output Total 200 ml 300 ml 150 ml


 


Balance 325 ml 0 ml 100 ml











Physical Exam


General:  Alert, Oriented X3, Cooperative, mild distress


Heart:  Regular rate


Lungs:  Clear


Abdomen:  Normal bowel sounds


Extremities:  No clubbing, No cyanosis, No edema, Normal pulses, No 

tenderness/swelling


Skin:  No rashes, No breakdown, No significant lesion





Assessment and Plan


Assessmemt and Plan


Problems


Medical Problems:


(1) Atrial fibrillation with rapid ventricular response


Status: Acute  





(2) CHF (congestive heart failure)


Status: Acute  











Comment


Review of Relevant


I have reviewed the following items dao (where applicable) has been applied.





Justifications for Admission


Other Justification














ALONDRA SOLIS MD                    Feb 22, 2021 13:30

## 2021-02-22 NOTE — PDOC
CARDIO Progress Notes


Date and Time


Date of Service


2/22/21


Time of Evaluation


1310





Subjective


Subjective:  No Chest Pain, No shortness of breath, No Palpitations





Vitals


Vitals





Vital Signs








  Date Time  Temp Pulse Resp B/P (MAP) Pulse Ox O2 Delivery O2 Flow Rate FiO2


 


2/22/21 11:11 98.0 78 18 123/66 (85) 95 Room Air  





 98.0       








Weight


Weight [ ]





Input and Output


Intake and Output











Intake and Output 


 


 2/22/21





 07:00


 


Intake Total 1075 ml


 


Output Total 650 ml


 


Balance 425 ml


 


 


 


Intake Oral 1075 ml


 


Output Urine Total 650 ml


 


# Voids 1











Physical Exam


HEENT:  Neck Supple W Full Motion


Chest:  Symmetric


LUNGS:  Clear to Auscultation


Heart:  irregularly irregular


Abdomen:  Soft N/T


Extremities:  No Edema


Neurology:  alert, follow commands





Assessment


Assessment


1.  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  Ejection fraction of 15 to 20%.  

Continue slow improvement.  We will continue present treatment. Okay to 

discharge from a CV standpoint. Follow up in our office with Dr. Castro as 

scheduled. 


2.  CAD s/p PCI/stent to the RCA 2009; CP free. Last C 6/2019 [facility] with moderate

diffuse CAD


3.  SSS s/p PPM ([employer])


4.  Accelerated hypertension: improved


5.  PAFIB with RVR; rate controlled


6.  Hyperlipidemia


7.  PUI; COVID negative





Justicifation of Admission Dx:


Justifications for Admission:


Justification of Admission Dx:  Yes











CHARANJIT ALVAREZ           Feb 22, 2021 13:35

## 2021-02-23 NOTE — PDOC3
[facility]-Discharge Summary


Date of Admission:


Date of Admission:  Feb 18, 2021





Date of Discharge:


Date of Discharge:  Feb 22, 2021





Discharge Diagnosis:


Discharge Diagnosis:


Acute on chronic diastolic systolic CHF with EF 15% - IV lasix, looks in florid 

CHF. Cardiology consulted


Ischemic cardiomyopathy


CAD s/p PCI/stent to the RCA 2009 - coronary angiogram 6/2019 [facility] with moderate 

diffuse CAD


SSS s/p PPM ([employer]) - needs interrogation


HTN - cont home meds


PAFIB - afib with RVR currently. Is on eliquis


Hyperlipidemia - cont statin





Hospital Course:


Hospital Course:


ON day of discharge patient was clinically stable.  Rest of his hospital course 

was uneventful.





2/22/2021


Improvement in his symptoms.  Patient is willing to go home with home health, 

but has stated that he will be out during the day most the time.  We have 

emphasized that the patient will need to be home with home health services to 

arrive.  We will continue current management for his CHF.  Pending further 

cardiology evaluation before absolute discharge.  Patient's chart, labs, images 

were reviewed and discussed with RN





2/21,  str better,   more comfortable,  CV following, plan DC in AM








Mr Barber is an 80-year-old male w/ PMHx SSS s/p PPM, HTN, diastolic CHF, 

smoker presents to the ED c/o progressive shortness of air that has progressed 

over the past month. He was going to a cardiology appointment prior to this, but

states he forgot his paperwork and came to ED as his shortness of breath was 

unbearable. Associated PND, orthopnea and LE edema. He notes decreased UOP and 

has been compliant with meds. No new cough or sick exposures.  He further denies

any nausea, diaphoresis or feeling of being lightheaded. 





Chest radiograph concerning for pulmonary edema with large right pleural 

effusion with lateral wall loculations previously noted


EKG was irregularly irregular rhythm no significant P waves found. BNP 24776


Started on IV lasix and diltiazem infusion for afib with RVR and admitted for 

further care





2/19: Placed on 2 L of O2 overnight.  He is breathing more comfortably.  Eating 

breakfast.  Labs stable. Off diltiazem, changed coreg to metoprolol





Off O2 currently.  Increased urine output with Lasix dosing yesterday.  

Breathing comfortably.  Eating breakfast.  Labs stable.  Tolerating metoprolol 

well with heart rate in 60s to 80s.  Still in A. fib.  Transferring to CV 

today.





Disposition:


Disposition/Orders:  D/C to Home





Activity:


Activity:


Resume previous activity





Diet:


Diet:  Cardiac





Medications:


Home Meds


Active Scripts


Sacubitril/Valsartan (Entresto 24 mg-26 mg Tablet) 1 Each Tablet, 1 TAB PO BID 

for CHF for 30 Days, #60 TAB 1 Refill


   Prov:JASE ABREU APRN         12/31/20


Lactobacillus Rhamnosus Gg (CULTURELLE) 1 Each Cap.sprink, 1 CAP PO BID for 

supplement for 30 Days, #60 CAP


   Prov:HERMINIO WILKERSON MD         12/31/20


Calcium Carbonate/Vitamin D3 (OYSCO 500+D TABLET) 1 Each Tablet, 2 TAB PO 

BIDWMEALS for bone health for 30 Days, #120 TAB


   Prov:HERMINIO WILKERSON MD         12/31/20


Spironolactone (ALDACTONE) 25 Mg Tablet, 25 MG PO DAILY for heart for 30 Days, 

#30 TAB


   Prov:HERMINIO WILKEROSN MD         12/31/20


Docusate Sodium (DOK) 100 Mg Capsule, 100 MG PO PRN BID PRN for CONSTIPATION for

14 Days, #30 CAP


   Prov:HERMINIO WILKERSON MD         1/9/20


Aspirin (ASPIRIN EC) 81 Mg Tablet.dr, 81 MG PO DAILYWBKFT for HEART HEALTH for 

30 Days, #30 TAB.SR


   Prov:HERMINIO WILKERSON MD         1/9/20


Reported Medications


Metoprolol Succinate (Toprol XL) 50 Mg Tab.er.24h, 50 MG PO DAILY for FOR 

HYPERTENSION, TAB.SR


   2/22/21


Apixaban (ELIQUIS) 2.5 Mg Tablet, 2.5 MG PO BID for f, TAB


   1/4/20


Tamsulosin Hcl (FLOMAX) 0.4 Mg Cap.er.24h, 1 CAP PO BID for bph, #30 CAP 11 

Refills


   1/4/20


Atorvastatin Calcium (ATORVASTATIN CALCIUM) 40 Mg Tablet, 1 TAB PO QHS for  HLD,

#90 TAB 3 Refills


   1/4/20


Magnesium Oxide (MAGNESIUM) 400 Mg Capsule, 1 CAP PO DAILY for supp for 30 Days,

#30 CAP 0 Refills


   1/4/20


Scheduled


Apixaban (Eliquis), 2.5 MG PO BID, (Reported)


Aspirin (Aspirin Ec), 81 MG PO DAILYWBKFT


Atorvastatin Calcium (Atorvastatin Calcium), 1 TAB PO QHS, (Reported)


Calcium Carbonate/Vitamin D3 (Oysco 500+D Tablet), 2 TAB PO BIDWMEALS


Lactobacillus Rhamnosus Gg (Culturelle), 1 CAP PO BID


Magnesium Oxide (Magnesium), 1 CAP PO DAILY, (Reported)


Metoprolol Succinate (Toprol XL), 50 MG PO DAILY, (Reported)


Sacubitril/Valsartan (Entresto 24 mg-26 mg Tablet), 1 TAB PO BID


Spironolactone (Aldactone), 25 MG PO DAILY


Tamsulosin Hcl (Flomax), 1 CAP PO BID, (Reported)





Scheduled PRN


Docusate Sodium (Dok), 100 MG PO PRN BID PRN for CONSTIPATION





Total Time:


Total Time:


Total time spent was  34  minutes in preparing scripts, discharge planning with 

SW and RN, and preparing this discharge summary. 








Patient seen and examined on day of discharge.





Justicifation of Admission Dx:


Justifications for Admission:


Justification of Admission Dx:  Yes











ALONDRA SOLIS MD                    Feb 23, 2021 04:24

## 2022-02-09 NOTE — RAD
EXAM: Chest, single view.



HISTORY: Shortness of air.



COMPARISON: 2/18/2021



FINDINGS: A frontal view of the chest is obtained.. There has been slight interval increase in suspec
massimo partially consolidated right upper and lower lobe infiltrate superimposed on stable diffuse inter
stitial infiltrate. There are stable moderate right and small left pleural effusions. There is a stab
le prominent cardiac silhouette and cardiac pacemaker. There is no pneumothorax.



IMPRESSION: Increase in partially consolidated right lung infiltrate superimposed on stable diffuse i
nterstitial infiltrate and right greater than left pleural effusions.



Electronically signed by: Katie Dodd MD (2/9/2022 3:42 PM) [id number]

## 2022-02-09 NOTE — PHYS DOC
Past Medical History


Past Medical History:  CHF, Heart Disease, Hypertension, Other


Additional Past Medical Histor:  BRADYCARDIA


Past Surgical History:  Pacemaker, Other


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


Smoking Status:  Current Some Day Smoker


Alcohol Use:  None


Drug Use:  None





General Adult


EDM:


Chief Complaint:  SHORTNESS OF BREATH





HPI:


HPI:


Patient is an 81-year-old male who presents to the emergency department with a 

family member for complaints of shortness of breath.  Per the family member nhan gonzalez has had increased shortness of breath.  Patient's family members from out 

of town and has not seen the patient for 2 months.  She reports that she 

received a phone call by patient's wife asking her to come into town to take him

to the hospital for shortness of breath.  Patient has no complaints at this 

time.  He does not feel like he is short of breath.  Patient does have a history

of hypertension, A. fib, CHF.  His family member states that he is not taking 

his medications as directed.  She reports that he has been hospitalized for CHF 

exacerbation in the past and at that time approximately 6 months ago they are 

questioning whether to put patient on hospice for CHF.  Patient denies chest 

pain, shortness of breath, cough, body aches, headaches, sick exposures, pain, 

increased leg swelling.





Review of Systems:


Review of Systems:


Constitutional:  negative unless reported in HPI


Eyes:  negative unless reported in HPI


HENT:  negative unless reported in HPI


Respiratory:  negative unless reported in HPI


Cardiovascular:  negative unless reported in HPI


GI:  negative unless reported in HPI


: negative unless reported in HPI


Musculoskeletal: negative unless reported in HPI


Integument:  negative unless reported in HPI


Neurologic:  negative unless reported in HPI


Endocrine: negative unless reported in HPI


Lymphatic:  negative unless reported in HPI


Psychiatric:  negative unless reported in HPI





Heart Score:


C/O Chest Pain:  No


Risk Factors:


Risk Factors:  DM, Current or recent (<one month) smoker, HTN, HLP, family 

history of CAD, obesity.


Risk Scores:


Score 0 - 3:  2.5% MACE over next 6 weeks - Discharge Home


Score 4 - 6:  20.3% MACE over next 6 weeks - Admit for Clinical Observation


Score 7 - 10:  72.7% MACE over next 6 weeks - Early Invasive Strategies





Allergies:


Allergies:





Allergies








Coded Allergies Type Severity Reaction Last Updated Verified


 


  No Known Drug Allergies    2/9/22 No











Physical Exam:


PE:





Constitutional: Well developed, well nourished, no acute distress, non-toxic 

appearance. []


HENT: Normocephalic, atraumatic, bilateral external ears normal, oropharynx 

moist, no oral exudates, nose normal. []


Eyes: PERRL, EOMI, conjunctiva normal, no discharge. [] 


Neck: Normal range of motion, no stridor


Cardiovascular:Heart rate irregular and tachycardic rhythm, no murmur []


Lungs & Thorax: Bilateral breath sounds clear in upper lobes, coarse in lower 

lobes


Abdomen: Bowel sounds normal, soft, no tenderness, no masses, no pulsatile 

masses. [] 


Skin: Warm, dry, no erythema, no rash. [] 


Back: No tenderness


Extremities: No tenderness, no cyanosis, no clubbing, ROM intact, 1+ pitting 

edema noted to bilateral lower extremities


Neurologic: Alert and oriented X 3, normal motor function, normal sensory 

function, no focal deficits noted. []


Psychologic: Affect normal, judgement normal, mood normal. []





Current Patient Data:


Labs:





Laboratory Tests








Test


 2/9/22


15:45 2/9/22


16:00


 


White Blood Count 5.7 x10^3/uL  


 


Red Blood Count 4.23 x10^6/uL  


 


Hemoglobin 12.0 g/dL  


 


Hematocrit 38.2 %  


 


Mean Corpuscular Volume 90 fL  


 


Mean Corpuscular Hemoglobin 28 pg  


 


Mean Corpuscular Hemoglobin


Concent 31 g/dL 


 





 


Red Cell Distribution Width 20.8 %  


 


Platelet Count 367 x10^3/uL  


 


Neutrophils (%) (Auto) 84 %  


 


Lymphocytes (%) (Auto) 6 %  


 


Monocytes (%) (Auto) 10 %  


 


Eosinophils (%) (Auto) 0 %  


 


Basophils (%) (Auto) 1 %  


 


Neutrophils # (Auto) 4.7 x10^3/uL  


 


Lymphocytes # (Auto) 0.3 x10^3/uL  


 


Monocytes # (Auto) 0.5 x10^3/uL  


 


Eosinophils # (Auto) 0.0 x10^3/uL  


 


Basophils # (Auto) 0.0 x10^3/uL  


 


Platelet Estimate Pending  


 


Sodium Level 144 mmol/L  


 


Potassium Level 3.9 mmol/L  


 


Chloride Level 108 mmol/L  


 


Carbon Dioxide Level 30 mmol/L  


 


Anion Gap 6  


 


Blood Urea Nitrogen 13 mg/dL  


 


Creatinine 1.1 mg/dL  


 


Estimated GFR


(Cockcroft-Gault) 77.7 


 





 


BUN/Creatinine Ratio 12  


 


Glucose Level 121 mg/dL  


 


Calcium Level 7.3 mg/dL  


 


Total Bilirubin 0.5 mg/dL  


 


Aspartate Amino Transf


(AST/SGOT) 21 U/L 


 





 


Alanine Aminotransferase


(ALT/SGPT) 23 U/L 


 





 


Alkaline Phosphatase 155 U/L  


 


Troponin I High Sensitivity 54 ng/L  


 


NT-Pro-B-Type Natriuretic


Peptide 15483 pg/mL 


 





 


Total Protein 7.6 g/dL  


 


Albumin 2.8 g/dL  


 


Albumin/Globulin Ratio 0.6  


 


Influenza Type A Antigen  Negative 


 


Influenza Type B Antigen  Negative 


 


SARS-CoV-2 Antigen (Rapid)  Negative 








Vital Signs:





                                   Vital Signs








  Date Time  Temp Pulse Resp B/P (MAP) Pulse Ox O2 Delivery O2 Flow Rate FiO2


 


2/9/22 15:21 97.6 121 32 146/108 (121) 94 Room Air  





 97.6       











EKG:


EKG:


[]





Radiology/Procedures:


Radiology/Procedures:


[]PROCEDURE: PORTABLE CHEST 1V





EXAM: Chest, single view.





HISTORY: Shortness of air.





COMPARISON: 2/18/2021





FINDINGS: A frontal view of the chest is obtained.. There has been slight 

interval increase in suspected partially consolidated right upper and lower lobe

 infiltrate superimposed on stable diffuse interstitial infiltrate. There are 

stable moderate right and small left pleural effusions. There is a stable 

prominent cardiac silhouette and cardiac pacemaker. There is no pneumothorax.





IMPRESSION: Increase in partially consolidated right lung infiltrate 

superimposed on stable diffuse interstitial infiltrate and right greater than 

left pleural effusions.





Electronically signed by: Katie Wong MD (2/9/2022 3:42 PM) [id number]














DICTATED and SIGNED BY:     KATIE WONG MD


DATE:     02/09/22 [id number]





Course & Med Decision Making:


Course & Med Decision Making


Pertinent Labs and Imaging studies reviewed. (See chart for details)





[] Patient presents to the emergency department brought in by family member for 

shortness of breath per the family member.  Patient does have a history of con

gestive heart failure, hypertension, A. fib.  His family member states he is not

 taking his medications as directed although she has not seen the patient in 2 

months.  Patient has no complaints.  He denies any shortness of breath, chest 

pain or increased leg swelling.  Apparently 6 months ago patient was inpatient 

for CHF and they are questioning whether to place patient on hospice at that 

time.  Patient is alert and oriented x4.  Work-up in the ER consisted of blood 

work, EKG, chest x-ray, COVID and influenza testing.


Patient CBC was unremarkable.  His troponin was not elevated.  Patient's BNP was

 73672.  Awaiting influenza and Covid results.  Chest x-ray showed pneumonia.


Patient's daughter came up to me and told me that he would like to leave.  I 

notified patient that we are still waiting on several results and awaiting tests

 to determine if he should be admitted or not.  Patient reports that he will not

 be admitted and he will not wait in the emergency department for his results.  

Patient's daughter states "can you just call us with the results".  I notified 

them that patient does have CHF exacerbation I asked if patient was taking any 

diuretics.  Daughter states that patient will not take any of his home 

medications.  Patient's daughter states "kidney does take him off the monitor 

and take that thing out of his arm" pointing to the IV.  I recommended that 

patient wait for lab work and test to determine if patient should be admitted 

for CHF exacerbation and pneumonia.  Patient reports that he will not be ad

mitted to the hospital.  Patient acknowledges the risks of signing out AGAINST 

MEDICAL ADVICE which include worsening of his condition and death.  Patient is 

alert and oriented x4 and capable of making his own medical decisions.  

Patient's daughter is at bedside during this conversation.  Patient will be 

discharged home with an antibiotic to treat his pneumonia.  Patient advised to 

follow-up with his primary care provider immediately.





Dragon Disclaimer:


Dragon Disclaimer:


This electronic medical record was generated, in whole or in part, using a voice

 recognition dictation system.





Departure


Departure


Impression:  


   Primary Impression:  


   Pneumonia


   Qualified Codes:  J18.9 - Pneumonia, unspecified organism


   Additional Impression:  


   CHF exacerbation


   Qualified Codes:  I50.9 - Heart failure, unspecified


Disposition:  07 LEFT AGAINST MEDICAL ADVICE


Condition:  GUARDED


Referrals:  


MARLEN RIZZO MD (PCP)


Patient Instructions:  Pneumonia, Adult





Additional Instructions:  


You were seen in the emergency department for shortness of breath.  You do have 

a pneumonia which will be treated with an antibiotic.  Please start and finish 

the antibiotic completely.  You also have CHF exacerbation.  Please take your 

medications as directed.  You decided to leave AGAINST MEDICAL ADVICE before 

receiving the rest of your lab work results and treatment.  You knowledge the 

risks of leaving AGAINST MEDICAL ADVICE which could be worsening of your 

condition and death.  Please follow-up with your primary care provider tomorrow.

  Return to the emergency department if you develop shortness of breath, chest 

pain, high fevers refractory to treatment, intractable nausea or vomiting, 

increased edema, weakness, confusion or any new or worsening concerns.


Scripts


Azithromycin (AZITHROMYCIN TABLET) 250 Mg Tablet


1 PKG PO UD for 5 Days, #6 TAB 0 Refills


   2 the first day followed by 1 for days 2-5


   Prov: SHASHI HOUSTON         2/9/22 


Amoxicillin/Potassium Clav (AMOX TR-K -125 MG TAB) 1 Each Tablet


1 TAB PO BID for 5 Days, #10 TAB 0 Refills


   Prov: SHASHI HOUSTON         2/9/22











SHASHI HOUSTON           Feb 9, 2022 15:33

## 2022-04-09 NOTE — RAD
Exam: Chest one view



INDICATION: Short of breath



TECHNIQUE: Frontal view of the chest



Comparisons: 2/9/2022



FINDINGS:

Pacer with leads terminating the right heart.



The cardiomediastinal silhouette and pulmonary vessels are within normal limits.



Patchy bilateral airspace disease. Moderate right and small left pleural effusion.



IMPRESSION:

Findings likely related to pulmonary edema with bilateral pleural effusions. Superimposed infectious 
process is difficult to exclude.



Electronically signed by: Nick Becker MD (4/9/2022 10:11 PM) ENMA

## 2022-04-10 NOTE — HP
DATE OF SERVICE: 04/10/2022

ADMIT DATE: 04/09/2022

CHIEF COMPLAINT:  Shortness of breath.



HISTORY OF PRESENT ILLNESS:  The patient is a pleasant 81-year-old male who 

presented to the ER last night with shortness of breath.  He has a known dilated

cardiomyopathy with a 15% ejection fraction.  He came in by EMS.  While in the 

ER, his chest x-ray showed vascular congestion with pleural effusions and 

possible pneumonia.  His BNP level was greater than 35,000.  He has now been 

admitted to the ICU with consultation to Cardiology.



PAST MEDICAL HISTORY:  Congestive heart failure, CAD, hypertension, bradycardia,

pacemaker, gastric tumor resection.



ALLERGIES:  None.



FAMILY HISTORY:  Coronary artery disease.



SOCIAL HISTORY:  He is retired .  He does not drink, smoke or take 

drugs.



MEDICATIONS:  Reviewed, please refer to the MRAD.



REVIEW OF SYSTEMS:

GENERAL:  He complains of weakness.

SKIN:  No bruising, hair changes or rashes.

EYES:  No blurred, double or loss of vision.

NOSE AND THROAT:  No history of nosebleeds, hoarseness or sore throat.

HEART:  No history of palpitations, chest pain or shortness of breath on 

exertion.

LUNGS:  He complains of shortness of breath.

GASTROINTESTINAL:  Denies changes in appetite, nausea, vomiting, diarrhea or 

constipation.

GENITOURINARY:  No history of frequency, urgency, hesitancy or nocturia.

NEUROLOGIC:  He complains of weakness.

PSYCHIATRIC:  No history of panic, anxiety or depression.

ENDOCRINE:  No history of heat or cold intolerance, polyuria or polydipsia.

EXTREMITIES:  He complains of edema.



PHYSICAL EXAMINATION:

VITALS:  Within normal limits and are stable.

GENERAL:  He is extremely weak.

HEENT:  Normal cephalic atraumatic, external auditory canals are patent

EYES:  Extraocular muscles are intact, pupils are equally round and reactive to 

light and accommodation

MUSCULOSKELETAL:  Well developed, well nourished, good range of motion

ENDOCRINE:  No thyromegaly was palpated

LYMPHATICS:  No cervical chain or axillary nodes were noted

HEMATOPOIETIC:  No bruising

NECK:  Supple, no JVD, no thyromegaly was noted.

LUNGS:  Clear to auscultation in all lung fields without rhonchi or wheezing.

HEART:  RRR, S1, S2 present.  Peripheral pulses intact, no obvious murmurs were 

noted.

ABDOMEN:  Soft, nontender.  Positive bowel sounds no organomegaly, normal bowel 

sounds.

EXTREMITIES:  He has edema that seems to have been diuresed.  His lower 

extremities are starting to wrinkle now.

NEUROLOGIC:  He is extremely weak.

PSYCHIATRIC:  Normal affect, normal mood.  Stable.

SKIN:  He has some ichthyosis.

VASCULAR:  Good capillary refill, neurovascular bundle appears to be intact.



LABORATORY DATA:  White count 6, hemoglobin 10.7, platelets 565.  Electrolytes: 

Sodium 143, potassium 4.0, chloride 109, bicarbonate 26, BUN 16, creatinine 1.4,

glucose 158.  BNP greater than 35,000.  Troponin 32.  Albumin 2.7.  Chest x-ray 

shows heart failure and/or pneumonia.



ASSESSMENT AND PLAN:  Acute on chronic systolic and diastolic heart failure, 

chronic renal insufficiency, anemia, respiratory failure, severe protein-calorie

malnutrition.  The patient has been admitted.  We will start IV Lasix.  Consult 

Cardiology.  Home meds.  Deep venous thrombosis prophylaxis.  Full code.  Serial

enzymes, serial EKGs.







MARIA LUISA

DR: Azul   DD: 04/10/2022 15:00

DT: 04/10/2022 16:38   TID: [id number]

## 2022-04-10 NOTE — PHYS DOC
Past Medical History


Past Medical History:  CHF, Heart Disease, Hypertension, Other


Additional Past Medical Histor:  BRADYCARDIA


Past Surgical History:  Pacemaker, Other


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


Smoking Status:  Never Smoker


Alcohol Use:  None


Drug Use:  None





General Adult


EDM:


Chief Complaint:  DYSPNEA/RESPIRATORY DISTRESS





HPI:


HPI:


Patient is an 81-year-old male brought in for shortness of breath.  He is 

currently on a CPAP machine by EMS and is too dyspneic to provide any history.  

Per EMS note history of CHF but they do not have any other information at this 

time.  Vital signs were stable in route other than the patient having 

significant dyspnea and tachypnea.  They noticed some rales as well.





Review of Systems:


Review of Systems:


Cannot perform due to acuity of condition





Heart Score:


C/O Chest Pain:  No


Risk Factors:


Risk Factors:  DM, Current or recent (<one month) smoker, HTN, HLP, family 

history of CAD, obesity.


Risk Scores:


Score 0 - 3:  2.5% MACE over next 6 weeks - Discharge Home


Score 4 - 6:  20.3% MACE over next 6 weeks - Admit for Clinical Observation


Score 7 - 10:  72.7% MACE over next 6 weeks - Early Invasive Strategies





Current Medications:





Current Medications








 Medications


  (Trade)  Dose


 Ordered  Sig/Reyna  Start Time


 Stop Time Status Last Admin


Dose Admin


 


 Furosemide


  (Lasix)  60 mg  1X  ONCE  4/9/22 22:30


 4/9/22 22:31 DC 4/9/22 22:12


60 MG


 


 Nicardipine HCl


 50 mg/Sodium


 Chloride  250 ml @ 


 25 mls/hr  CONT  PRN  4/9/22 21:00


    4/9/22 21:08


25 MLS/HR


 


 Nitroglycerin


  (Nitrostat)  0.4 mg  PRN Q5MIN  PRN  4/9/22 21:00


    4/9/22 20:58


0.4 MG











Allergies:


Allergies:





Allergies








Coded Allergies Type Severity Reaction Last Updated Verified


 


  No Known Drug Allergies    4/9/22 No











Physical Exam:


PE:





Constitutional: Appears to be in respiratory distress


HENT: Normocephalic, atraumatic, bilateral external ears normal, oropharynx 

moist, no oral exudates, nose normal. []


Eyes: PERRLA, EOMI, conjunctiva normal, no discharge. [] 


Neck: Normal range of motion, no tenderness, supple, no stridor. [] 


Cardiovascular:Heart rate regular rhythm, no murmur []


Lungs & Thorax: Diminished bilateral breath sound with some rales


Abdomen: Bowel sounds normal, soft, no tenderness, no masses, no pulsatile 

masses. [] 


Skin: Warm, dry, no erythema, no rash. [] 


Back: No tenderness, no CVA tenderness. [] 


Extremities: No tenderness, no cyanosis, no clubbing, ROM intact, no edema. [] 


Neurologic: Alert





Current Patient Data:


Labs:





                                Laboratory Tests








Test


 4/9/22


20:52 4/9/22


21:00


 


White Blood Count


 6.0 x10^3/uL


(4.0-11.0) 





 


Red Blood Count


 4.03 x10^6/uL


(4.30-5.70)  L 





 


Hemoglobin


 10.7 g/dL


(13.0-17.5)  L 





 


Hematocrit


 34.7 %


(39.0-53.0)  L 





 


Mean Corpuscular Volume


 86 fL ()


 





 


Mean Corpuscular Hemoglobin 27 pg (25-35)   


 


Mean Corpuscular Hemoglobin


Concent 31 g/dL


(31-37) 





 


Red Cell Distribution Width


 21.5 %


(11.5-14.5)  H 





 


Platelet Count


 565 x10^3/uL


(140-400)  H 





 


Sodium Level


 143 mmol/L


(136-145) 





 


Potassium Level


 4.0 mmol/L


(3.5-5.1) 





 


Chloride Level


 109 mmol/L


()  H 





 


Carbon Dioxide Level


 26 mmol/L


(21-32) 





 


Anion Gap 8 (6-14)   


 


Blood Urea Nitrogen


 16 mg/dL


(8-26) 





 


Creatinine


 1.4 mg/dL


(0.7-1.3)  H 





 


Estimated GFR


(Cockcroft-Gault) 58.9  


 





 


BUN/Creatinine Ratio 11 (6-20)   


 


Glucose Level


 158 mg/dL


(70-99)  H 





 


Calcium Level


 7.0 mg/dL


(8.5-10.1)  L 





 


Total Bilirubin


 0.3 mg/dL


(0.2-1.0) 





 


Aspartate Amino Transferase


(AST) 19 U/L (15-37)


 





 


Alanine Aminotransferase (ALT)


 21 U/L (16-63)


 





 


Alkaline Phosphatase


 104 U/L


() 





 


Troponin I High Sensitivity


 32 ng/L (4-75)


 





 


NT-Pro-B-Type Natriuretic


Peptide > 83993 pg/mL


(0-449)  H 





 


Total Protein


 6.8 g/dL


(6.4-8.2) 





 


Albumin


 2.7 g/dL


(3.4-5.0)  L 





 


Albumin/Globulin Ratio


 0.7 (1.0-1.7)


L 





 


O2 Saturation  94 % (92-99)  


 


Arterial Blood pH


 


 7.39


(7.35-7.45)


 


Arterial Blood pCO2 at


Patient Temp 


 40 mmHg


(35-46)


 


Arterial Blood pO2 at Patient


Temp 


 77 mmHg


()


 


Arterial Blood HCO3


 


 23 mmol/L


(21-28)


 


Arterial Blood Base Excess


 


 -2 mmol/L


(-3-3)


 


FiO2  40 (bipap)  





                                Laboratory Tests


4/9/22 20:52








                                Laboratory Tests


4/9/22 20:52








Vital Signs:





                                   Vital Signs








  Date Time  Temp Pulse Resp B/P (MAP) Pulse Ox O2 Delivery O2 Flow Rate FiO2


 


4/9/22 23:42  76 20 151/76 (101) 97 BiPAP/CPAP  











EKG:


EKG:


[]





Radiology/Procedures:


Radiology/Procedures:


[]





Course & Med Decision Making:


Course & Med Decision Making


Pertinent Labs and Imaging studies reviewed. (See chart for details)





On arrival the patient was extremely dyspneic and he was immediately placed on 

BiPAP.  I noticed his blood pressure to be 200 systolic on the correct size 

cuff.  Therefore patient was given sublingual nitro.  I suspect the CHF exacer

bation was worsened by the elevated afterload.  He was then placed on a 

nicardipine drip.  During the course of his emergency department stay the 

patient has been feeling much better and is now able to speak clearly.  Still 

feels short of breath.  Denies any chest pain.





Dragon Disclaimer:


Dragon Disclaimer:


This electronic medical record was generated, in whole or in part, using a voice

 recognition dictation system.





Departure


Departure


Impression:  


   Primary Impression:  


   CHF exacerbation


   Additional Impressions:  


   Acute respiratory failure


   Hypertensive emergency


Disposition:  09 ADMITTED AS INPATIENT


Condition:  IMPROVED


Referrals:  


MARLEN RIZZO MD (PCP)





Critical Care Time


 Critical care time was 35 minutes exclusive of procedures.  Time was spent 

reviewing labs and reevaluating the patient at bedside.











SULY RAO MD                 Apr 10, 2022 00:09

## 2022-04-10 NOTE — NUR
Patient transferred to room 669 by bed, on O2 support via nasal cannula at 2lpm.. 
Cardiologist haven't called back yet. Handed over for continuity of care to HORTENSIA Jeong.

## 2022-04-11 NOTE — NUR
SS following for discharge planning. SS reviewed pt chart and discussed with pt RN. Pt is 
from home with family and is currently requiring oxygen at three liters nasal canula. 
COVID19 negative. Pt has no home oxygen. Cardiology following. Low ejection fraction. Pt 
follows up with  Cardiology. SS will continue to follow for discharge planning.

## 2022-04-11 NOTE — PDOC
[employer] PROGRESS NOTE


Date of Service


DOS:


DATE: 4/11/22 


TIME: 11:20





Chief Complaint


Chief Complaint


Acute on chronic hypoxic respiratory failure


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


NICM


CAD with stent in 2009 currently chest pain-free


 SSS s/p PPM ([employer]tr[employer])


History of hypertension


History of paroxysmal atrial fibrillation


History of dyslipidemia


History of colon resection





Pending cardiology evaluation


Continue IV Lasix diuresis


Continue strict I's/O, sodium restriction, daily weights


Continue Eliquis for DVT prophylaxis and A. fib





History of Present Illness


History of Present Illness


 81-year-old male who 


presented to the ER last night with shortness of breath.  He has a known dilated


cardiomyopathy with a 15% ejection fraction.  He came in by EMS.  While in the 


ER, his chest x-ray showed vascular congestion with pleural effusions and 


possible pneumonia.  His BNP level was greater than 35,000.  He has now been 


admitted to the ICU with consultation to Cardiology.





4/11/2022


No acute events overnight.  Patient seen examined bedside.  AF and VSS.  3 L 

urine output.  Still has 2+ pedal edema.  Not short of breath currently.  

Saturating 94% on 3 L nasal cannula.  Patient will likely need 1 or 2 more days 

of diuresis.  Cardiology evaluation pending.  Patient's chart, labs, images were

reviewed and discussed with RN





Vitals/I&O


Vitals/I&O:





                                   Vital Signs








  Date Time  Temp Pulse Resp B/P (MAP) Pulse Ox O2 Delivery O2 Flow Rate FiO2


 


4/11/22 09:00  81  135/83    


 


4/11/22 08:00      Nasal Cannula 3.0 


 


4/11/22 07:00 98.2  19  94   





 98.2       














                                    I & O   


 


 4/10/22 4/10/22 4/11/22





 15:00 23:00 07:00


 


Intake Total 300 ml 200 ml 100 ml


 


Output Total 400 ml 120 ml 200 ml


 


Balance -100 ml 80 ml -100 ml











Physical Exam


General:  Alert, Oriented X3, Cooperative


Heart:  Regular rate


Lungs:  Clear


Abdomen:  Normal bowel sounds


Extremities:  No clubbing


Skin:  No rashes





Labs


Labs:





Laboratory Tests








Test


 4/11/22


09:40


 


Sodium Level


 143 mmol/L


(136-145)


 


Potassium Level


 4.5 mmol/L


(3.5-5.1)


 


Chloride Level


 107 mmol/L


()


 


Carbon Dioxide Level


 29 mmol/L


(21-32)


 


Anion Gap 7 (6-14) 


 


Blood Urea Nitrogen


 17 mg/dL


(8-26)


 


Creatinine


 1.2 mg/dL


(0.7-1.3)


 


Estimated GFR


(Cockcroft-Gault) 70.3 





 


Glucose Level


 143 mg/dL


(70-99)


 


Calcium Level


 7.3 mg/dL


(8.5-10.1)


 


Magnesium Level


 2.0 mg/dL


(1.8-2.4)











Assessment and Plan


Assessmemt and Plan


Problems


Medical Problems:


(1) Acute respiratory failure


Status: Acute  





(2) CHF exacerbation


Status: Acute  





(3) Hypertensive emergency


Status: Acute  











Comment


Review of Relevant


I have reviewed the following items dao (where applicable) has been applied.


Medications:





Current Medications








 Medications


  (Trade)  Dose


 Ordered  Sig/Reyna


 Route


 PRN Reason  Start Time


 Stop Time Status Last Admin


Dose Admin


 


 Amoxicillin/


 Clavulanate


 Potassium


  (Augmentin 875/


 125mg)  1 tab  BID


 PO


   4/10/22 21:00


    4/11/22 08:41





 


 Apixaban


  (Eliquis)  2.5 mg  BID


 PO


   4/10/22 21:00


    4/11/22 08:48





 


 Aspirin


  (Ecotrin)  81 mg  DAILYWBKFT


 PO


   4/11/22 08:00


    4/11/22 08:39





 


 Atorvastatin


 Calcium


  (Lipitor)  40 mg  QHS


 PO


   4/10/22 21:00


    4/10/22 22:06





 


 Calcium/Vitamin D


  (Oscal D 500mg/


 200uts)  2 tab  BIDWMEALS


 PO


   4/10/22 17:00


    4/11/22 08:42





 


 Lactobacillus


 Rhamnosus


  (Culturelle)  1 cap  BID


 PO


   4/10/22 21:00


    4/11/22 08:40





 


 Metoprolol


 Succinate


  (Toprol Xl)  50 mg  DAILY


 PO


   4/11/22 09:00


    4/11/22 08:40





 


 Sacubitril/


 Valsartan


  (Entresto 24


 Mg-26 Mg)  1 tab  BID


 PO


   4/10/22 21:00


    4/11/22 09:00





 


 Spironolactone


  (Aldactone)  25 mg  DAILY


 PO


   4/11/22 09:00


    4/11/22 08:40





 


 Tamsulosin HCl


  (Flomax)  0.4 mg  BID


 PO


   4/10/22 21:00


    4/11/22 08:42





 


 Magnesium Oxide


  (Magnesium Oxide)  400 mg  DAILY


 PO


   4/11/22 09:00


    4/11/22 08:41














Justifications for Admission


Other Justification














ALONDRA SOLIS MD                  Apr 11, 2022 11:26

## 2022-04-11 NOTE — EKG
[facility]

              [address] [place] [address]

Test Date:    2022               Test Time:    21:06:14

Pat Name:     ABHIJIT GILBERT       Department:   

Patient ID:   [id number]           Room:         [facility]

Gender:       M                        Technician:   

:          1940               Requested By: SULY RAO

Order Number: [id number]           Reading MD:   Lester Castro

                                 Measurements

Intervals                              Axis          

Rate:         88                       P:            40

NC:           188                      QRS:          24

QRSD:         90                       T:            172

QT:           414                                    

QTc:          505                                    

                           Interpretive Statements

SINUS RHYTHM

ATRIAL PREMATURE COMPLEX(ES)

T ABNORMALITY IN ANTERIOR LEADS

LATERAL LEADS

PROLONGED QT

ABNORMAL ECG

Electronically Signed On 4- 14:08:22 CDT by Lester Castro

## 2022-04-11 NOTE — PDOC2
CHARANJIT ALVAREZ APRN 4/11/22 1003:


CARDIAC CONSULT


DATE OF CONSULT


Date of Consult


DATE: 4/11/22 


TIME: 09:56





REASON FOR CONSULT


Reason for Consult:


CHF





REFERRING PHYSICIAN


Referring Physician:


Dr. Rangel





SOURCE


Source:  Chart review, Patient





HISTORY OF PRESENT ILLNESS


HISTORY OF PRESENT ILLNESS


This is an 80 yo male who presented secondary to shortness of breath. Reports he

has been short of breath for "some time". Much worse the day of arrival. Denies 

any chest pain or palpitations. Does reports some lower extremity edema. Did 

require BiPAP upon arrival. Breathing has improved s/p diuresis and in now on 

NC. Reports compliance with medications "I take what my wife gives me".





PAST MEDICAL HISTORY


Past Medical History


Cardiovascular:  AFIB (paroxysmal), CAD (noted with diffuse CAD in 6/2019), CHF,

HTN, Other (symtomatic bradycardia; severe cardiomyopathy)


Pulmonary:  COPD


CENTRAL NERVOUS SYSTEM:  Other (No pertinent history)


GI:  GERD


Heme/Onc:  Anemia NOS


Hepatobiliary:  No pertinent hx


Psych:  No pertinent hx


Musculoskeletal:  Osteoarthritis


Rheumatologic:  No pertinent hx


Infectious disease:  No pertinent hx


ENT:  No pertinent hx


Renal/:  Benign prostatic enlarg.


Endocrine:  No pertinent hx


Dermatology:  No pertinent hx





PAST SURGICAL HISTORY


Past Surgical History


Pacemaker, Colon Resection, Other (back surgery; past thoracentesis; PCI/stent 

to the RCA 2009)





FAMILY HISTORY


Family History:  Hypertension





SOCIAL HISTORY


Social History


Smoke:  <1 pack per day


ALCOHOL:  none


Drugs:  None


Lives:  with Family





CURRENT MEDICATIONS


CURRENT MEDICATIONS





Current Medications








 Medications


  (Trade)  Dose


 Ordered  Sig/Reyna


 Route


 PRN Reason  Start Time


 Stop Time Status Last Admin


Dose Admin


 


 Amoxicillin/


 Clavulanate


 Potassium


  (Augmentin 875/


 125mg)  1 tab  BID


 PO


   4/10/22 21:00


    4/11/22 08:41





 


 Apixaban


  (Eliquis)  2.5 mg  BID


 PO


   4/10/22 21:00


    4/11/22 08:48





 


 Aspirin


  (Ecotrin)  81 mg  DAILYWBKFT


 PO


   4/11/22 08:00


    4/11/22 08:39





 


 Atorvastatin


 Calcium


  (Lipitor)  40 mg  QHS


 PO


   4/10/22 21:00


    4/10/22 22:06





 


 Calcium/Vitamin D


  (Oscal D 500mg/


 200uts)  2 tab  BIDWMEALS


 PO


   4/10/22 17:00


    4/11/22 08:42





 


 Lactobacillus


 Rhamnosus


  (Culturelle)  1 cap  BID


 PO


   4/10/22 21:00


    4/11/22 08:40





 


 Metoprolol


 Succinate


  (Toprol Xl)  50 mg  DAILY


 PO


   4/11/22 09:00


    4/11/22 08:40





 


 Sacubitril/


 Valsartan


  (Entresto 24


 Mg-26 Mg)  1 tab  BID


 PO


   4/10/22 21:00


    4/10/22 22:06





 


 Spironolactone


  (Aldactone)  25 mg  DAILY


 PO


   4/11/22 09:00


    4/11/22 08:40





 


 Tamsulosin HCl


  (Flomax)  0.4 mg  BID


 PO


   4/10/22 21:00


    4/11/22 08:42





 


 Magnesium Oxide


  (Magnesium Oxide)  400 mg  DAILY


 PO


   4/11/22 09:00


    4/11/22 08:41














ALLERGIES


ALLERGIES:  


Coded Allergies:  


     No Known Drug Allergies (Unverified , 4/9/22)





ROS


Review of System


14 point ROS conducted with pertinent positives noted above in HPI





PHYSICAL EXAM


PHYSICAL EXAM


General:  Alert, Oriented X3, Cooperative, No acute distress


HEENT:  Atraumatic, Mucous membr. moist/pink


Lungs:  Other (diminished)


Heart:  IRRR (AFIB), Other (distant heart sounds)


Abdomen:  Soft, No tenderness


Extremities:  No cyanosis, Other (trace-1+ bilateral LE edema)


Skin:  No breakdown, No significant lesion


Neuro:  Normal speech, Sensation intact


Psych/Mental Status:  Mood NL


MUSCULOSKELETAL:  Osteoarthritic changes both hands





VITALS/I&O


VITALS/I&O:





                                   Vital Signs








  Date Time  Temp Pulse Resp B/P (MAP) Pulse Ox O2 Delivery O2 Flow Rate FiO2


 


4/11/22 08:40  81  135/83    


 


4/11/22 07:00 98.2  19  94 Nasal Cannula 3.0 





 98.2       














                                    I & O   


 


 4/10/22 4/10/22 4/11/22





 15:00 23:00 07:00


 


Intake Total 300 ml 200 ml 100 ml


 


Output Total 400 ml 120 ml 200 ml


 


Balance -100 ml 80 ml -100 ml











ECHOCARDIOGRAM


ECHOCARDIOGRAM


<Conclusion>


The left ventricular systolic function is severely impaired.


Base to mid inferior wall akinetic.


The Ejection Fraction is 15-20%.


Transmitral Doppler flow pattern is Grade I-abnormal relaxation pattern.


There is a pacemaker lead in the right atrium and ventricle.


Trace mitral regurgitation.


Mild tricuspid regurgitation with an estimated PAP of 40 mmHg.


There is no evidence of significant pericardial effusion.





DATE:     12/30/20





ASSESSMENT/PLAN


ASSESSMENT/PLAN


1.  Acute on chronic respiratory failure secondary to acute on chronic 

diastolic, systolic CHF


2.  Mixed ischemic/NICM; Echo 12/20 with LVEF 15-20%  


4.  CAD s/p PCI/stent to the RCA 2009; CP free. Last C 6/2019 [facility] with moderate

diffuse CAD


5.  SSS s/p PPM ([employer]tr[employer])


6.  Accelerated hypertension: improved. off Cardene gtt


7.  PAFIB with RVR; rate now controlled but remains in AFIB


8.  Hyperlipidemia; statin 








Recommendations





HF optimization 


Diuresis with monitoring of renal function 


Monitor I and O, daily weight


Continue Entresto, Toprol, spironolactone


Secondary prevention; ASA/statin 


Eliquis for stroke prevention


Consider outpatient ischemic evaluation and upgrade to AICD as an outpatient.


Supportive care





XIMENA RECIO MD 4/11/22 2047:


CARDIAC CONSULT


ASSESSMENT/PLAN


ASSESSMENT/PLAN


Patient seen and examined.  Agree with NP's assessment and plan as stated above











CHARANJIT ALVAREZ           Apr 11, 2022 10:03


XIMENA RECIO MD           Apr 11, 2022 20:47

## 2022-04-12 NOTE — SNU/HH DC
DISCHARGE ORDERS


DISCHARGE INFORMATION:


DISCHARGE DATE:  Apr 12, 2022


FINAL DIAGNOSIS


Problems


Medical Problems:


(1) Acute respiratory failure


Status: Acute  





(2) CHF exacerbation


Status: Acute  





(3) Hypertensive emergency


Status: Acute  








CONDITION ON DISCHARGE:  Stable





CODE STATUS:


Code Status:  Full





POST DISCHARGE ORDERS:


ACTIVITY ORDERS:  No restrictions, Activity as tolerated


WEIGHT BEARING STATUS:  No restrictions, As tolerated


DIET AFTER DISCHARGE:  Cardiac


WOUND/INCISION CARE:  No wound care needed





CHECKS AFTER DISCHARGE:


CHECKS AFTER DISCHARGE:  Check blood press - daily, Check your Temp as needed, 

Weigh Yourself Daily





FOLLOW-UP:


PHYSICIAN FOLLOW-UP:  PCP within 2 weeks of discharge


ADDITIONAL FOLLOW-UP:  Cardiology as needed as scheduled


LAB ORDERS FOR FOLLOW-UP:  CBC, CMP





TREATMENT/EQUIPMENT ORDERS:


ADAPTIVE EQUIPMENT NEEDED:  None, Front wheeled walker





DISCHARGE MEDICATIONS:


Home Meds


Active Scripts


Sacubitril/Valsartan (Entresto 24 mg-26 mg Tablet) 1 Each Tablet, 1 TAB PO BID 

for CHF for 30 Days, #60 TAB 1 Refill


   Prov:JASE ABREU APRN         12/31/20


Lactobacillus Rhamnosus Gg (CULTURELLE) 1 Each Cap.sprink, 1 CAP PO BID for 

supplement for 30 Days, #60 CAP


   Prov:HERMINIO WILKERSON MD         12/31/20


Calcium Carbonate/Vitamin D3 (OYSCO 500+D TABLET) 1 Each Tablet, 2 TAB PO 

BIDWMEALS for bone health for 30 Days, #120 TAB


   Prov:HERMINIO WILKERSON MD         12/31/20


Spironolactone (ALDACTONE) 25 Mg Tablet, 25 MG PO DAILY for heart for 30 Days, 

#30 TAB


   Prov:HERMINIO WILKERSON MD         12/31/20


Docusate Sodium (DOK) 100 Mg Capsule, 100 MG PO PRN BID PRN for CONSTIPATION for

14 Days, #30 CAP


   Prov:HERMINIO WILKERSON MD         1/9/20


Aspirin (ASPIRIN EC) 81 Mg Tablet.dr, 81 MG PO DAILYWBKFT for HEART HEALTH for 

30 Days, #30 TAB.SR


   Prov:HERMINIO WILKERSON MD         1/9/20


Reported Medications


Metoprolol Succinate (Toprol XL) 50 Mg Tab.er.24h, 50 MG PO DAILY for FOR 

HYPERTENSION, TAB.SR


   2/22/21


Apixaban (ELIQUIS) 2.5 Mg Tablet, 2.5 MG PO BID for f, TAB


   1/4/20


Tamsulosin Hcl (FLOMAX) 0.4 Mg Cap.er.24h, 1 CAP PO BID for bph, #30 CAP 11 

Refills


   1/4/20


Atorvastatin Calcium (ATORVASTATIN CALCIUM) 40 Mg Tablet, 1 TAB PO QHS for  HLD,

#90 TAB 3 Refills


   1/4/20


Magnesium Oxide (MAGNESIUM) 400 Mg Capsule, 1 CAP PO DAILY for supp for 30 Days,

#30 CAP 0 Refills


   1/4/20


Discontinued Scripts


Azithromycin (AZITHROMYCIN TABLET) 250 Mg Tablet, 1 PKG PO UD for 5 Days, #6 TAB

0 Refills


   2 the first day followed by 1 for days 2-5


   Prov:SHASHI HOUSTON         2/9/22


Amoxicillin/Potassium Clav (AMOX TR-K -125 MG TAB) 1 Each Tablet, 1 TAB 

PO BID for 5 Days, #10 TAB 0 Refills


   Prov:SHASHI HOUSTON         2/9/22











ALONDRA SOLIS MD                  Apr 12, 2022 14:00

## 2022-04-12 NOTE — NUR
SS following up with discharge planning. SS reviewed pt chart and discussed with pt RN. Pt 
is currently requiring oxygen at three liters nasal canula. COVID19 negative. Pt has no home 
oxygen. Cardiology following. Low ejection fraction. SS will continue to follow for 
discharge planning.

## 2022-04-12 NOTE — PDOC
CHARANJIT ALVAREZ 4/12/22 1116:


CARDIO Progress Notes


Date and Time


Date of Service


4/12/22


Time of Evaluation


1115





Subjective


Subjective:  No Chest Pain, No shortness of breath, No Palpitations, No 

Dizziness





Vitals


Vitals





Vital Signs








  Date Time  Temp Pulse Resp B/P (MAP) Pulse Ox O2 Delivery O2 Flow Rate FiO2


 


4/12/22 09:29  74  134/77    


 


4/12/22 08:00      Nasal Cannula 3.0 


 


4/12/22 07:00 97.8  20  100   





 97.8       








Weight


Weight [ ]





Input and Output


Intake and Output











Intake and Output 


 


 4/12/22





 07:00


 


Intake Total 240 ml


 


Output Total 1975 ml


 


Balance -1735 ml


 


 


 


Intake Oral 240 ml


 


Output Urine Total 1975 ml


 


# Voids 1


 


# Bowel Movements 2











Physical Exam


HEENT:  Neck Supple W Full Motion


Chest:  Symmetric


LUNGS:  Other (diminished bases)


Heart:  irregularly irregular


Abdomen:  Soft N/T


Extremities:  Other (trace pedal edema )


Neurology:  alert, oriented, follow commands





Assessment


Assessment


1.  Acute on chronic respiratory failure secondary to acute on chronic 

diastolic, systolic CHF. improved s/p IV diuresis 


2.  Mixed ischemic/NICM; Echo 12/20 with LVEF 15-20%  


4.  CAD s/p PCI/stent to the RCA 2009; CP free. Last C 6/2019 [facility] with moderate

diffuse CAD


5.  SSS s/p PPM ([employer])


6.  Accelerated hypertension: improved. off Cardene gtt


7.  PAFIB with RVR; rate now controlled but remains in AFIB


8.  Hyperlipidemia; statin 








Recommendations





HF optimization 


Convert Lasix to oral


Continue Entresto, Toprol, spironolactone


Secondary prevention; ASA/statin 


Eliquis for stroke prevention


Consider outpatient ischemic evaluation and upgrade to AICD as an outpatient.


Supportive care


Follow up in our office as scheduled





Justicifation of Admission Dx:


Justifications for Admission:


Justification of Admission Dx:  Yes





XIMENA RECIO MD 4/12/22 2043:


CARDIO Progress Notes


Assessment


Assessment


Patient seen and examined.  Agree with NP's assessment and plan as stated above


Change diuretics to PO and follow up with our office as scheduled


Continue current medical regimen











CHARANJIT ALVAREZ           Apr 12, 2022 11:16


XIMENA RECIO MD           Apr 12, 2022 20:43

## 2022-04-17 NOTE — PDOC3
[facility]-Discharge Summary


Date of Admission:


Date of Admission:  Apr 10, 2022





Date of Discharge:


Date of Discharge:  Apr 12, 2022





Discharge Diagnosis:


Discharge Diagnosis:


Acute on chronic hypoxic respiratory failure


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


NICM


CAD with stent in 2009 currently chest pain-free


 SSS s/p PPM ([employer])


History of hypertension


History of paroxysmal atrial fibrillation


History of dyslipidemia


History of colon resection





Consults:


Consults:


Per cardiology:


Recommendations





HF optimization 


Convert Lasix to oral


Continue Entresto, Toprol, spironolactone


Secondary prevention; ASA/statin 


Eliquis for stroke prevention


Consider outpatient ischemic evaluation and upgrade to AICD as an outpatient.


Supportive care


Follow up in our office as scheduled





Justicifation of Admission Dx:


Justifications for Admission:


Justification of Admission Dx:  Yes





XIMENA RECIO MD 4/12/22 2043:


CARDIO Progress Notes


Assessment


Assessment


Patient seen and examined.  Agree with NP's assessment and plan as stated above


Change diuretics to PO and follow up with our office as scheduled


Continue current medical regimen





Hospital Course:


Hospital Course:


 81-year-old male who 


presented to the ER last night with shortness of breath.  He has a known dilated


cardiomyopathy with a 15% ejection fraction.  He came in by EMS.  While in the 


ER, his chest x-ray showed vascular congestion with pleural effusions and 


possible pneumonia.  His BNP level was greater than 35,000.  He has now been 


admitted to the ICU with consultation to Cardiology.





4/11/2022


No acute events overnight.  Patient seen examined bedside.  AF and VSS.  3 L 

urine output.  Still has 2+ pedal edema.  Not short of breath currently.  

Saturating 94% on 3 L nasal cannula.  Patient will likely need 1 or 2 more days 

of diuresis.  Cardiology evaluation pending.  Patient's chart, labs, images were

reviewed and discussed with RN





By day of discharge patient clinically improved and reached pretty much to his 

dry weight.  Patient did receive IV diuresis and improved significantly.  Rest 

of hospital course was uneventful.  See cardiology recommendations above.  

Patient's chart, labs, images were reviewed and discussed with RN





Disposition:


Disposition/Orders:  D/C to Home





Activity:


Activity:


Resume previous activity





Diet:


Diet:  Cardiac





Medications:


Home Meds


Active Scripts


Sacubitril/Valsartan (Entresto 24 mg-26 mg Tablet) 1 Each Tablet, 1 TAB PO BID 

for CHF for 30 Days, #60 TAB 1 Refill


   Prov:JASE ABREU APRN         12/31/20


Lactobacillus Rhamnosus Gg (CULTURELLE) 1 Each Cap.sprink, 1 CAP PO BID for tong

pplement for 30 Days, #60 CAP


   Prov:HERMINIO WILKERSON MD         12/31/20


Calcium Carbonate/Vitamin D3 (OYSCO 500+D TABLET) 1 Each Tablet, 2 TAB PO 

BIDWMEALS for bone health for 30 Days, #120 TAB


   Prov:HERMINIO WILKERSON MD         12/31/20


Spironolactone (ALDACTONE) 25 Mg Tablet, 25 MG PO DAILY for heart for 30 Days, 

#30 TAB


   Prov:HERMINIO WILKERSON MD         12/31/20


Docusate Sodium (DOK) 100 Mg Capsule, 100 MG PO PRN BID PRN for CONSTIPATION for

14 Days, #30 CAP


   Prov:HERMINIO WILKERSON MD         1/9/20


Aspirin (ASPIRIN EC) 81 Mg Tablet.dr, 81 MG PO DAILYWBKFT for HEART HEALTH for 

30 Days, #30 TAB.SR


   Prov:HERMINIO WILKERSON MD         1/9/20


Reported Medications


Metoprolol Succinate (Toprol XL) 50 Mg Tab.er.24h, 50 MG PO DAILY for FOR 

HYPERTENSION, TAB.SR


   2/22/21


Apixaban (ELIQUIS) 2.5 Mg Tablet, 2.5 MG PO BID for f, TAB


   1/4/20


Tamsulosin Hcl (FLOMAX) 0.4 Mg Cap.er.24h, 1 CAP PO BID for bph, #30 CAP 11 

Refills


   1/4/20


Atorvastatin Calcium (ATORVASTATIN CALCIUM) 40 Mg Tablet, 1 TAB PO QHS for  HLD,

#90 TAB 3 Refills


   1/4/20


Magnesium Oxide (MAGNESIUM) 400 Mg Capsule, 1 CAP PO DAILY for supp for 30 Days,

#30 CAP 0 Refills


   1/4/20


Discontinued Scripts


Azithromycin (AZITHROMYCIN TABLET) 250 Mg Tablet, 1 PKG PO UD for 5 Days, #6 TAB

0 Refills


   2 the first day followed by 1 for days 2-5


   Prov:SHASHI HOUSTON APRN         2/9/22


Amoxicillin/Potassium Clav (AMOX TR-K -125 MG TAB) 1 Each Tablet, 1 TAB 

PO BID for 5 Days, #10 TAB 0 Refills


   Prov:SHASHI HOUSTON APRN         2/9/22


Scheduled


Apixaban (Eliquis), 2.5 MG PO BID, (Reported)


Aspirin (Aspirin Ec), 81 MG PO DAILYWBKFT


Atorvastatin Calcium (Atorvastatin Calcium), 1 TAB PO QHS, (Reported)


Calcium Carbonate/Vitamin D3 (Oysco 500+D Tablet), 2 TAB PO BIDWMEALS


Lactobacillus Rhamnosus Gg (Culturelle), 1 CAP PO BID


Magnesium Oxide (Magnesium), 1 CAP PO DAILY, (Reported)


Metoprolol Succinate (Toprol XL), 50 MG PO DAILY, (Reported)


Sacubitril/Valsartan (Entresto 24 mg-26 mg Tablet), 1 TAB PO BID


Spironolactone (Aldactone), 25 MG PO DAILY


Tamsulosin Hcl (Flomax), 1 CAP PO BID, (Reported)





Scheduled PRN


Docusate Sodium (Dok), 100 MG PO PRN BID PRN for CONSTIPATION





Discontinued Medications


Amoxicillin/Potassium Clav (Amox Tr-K Clv 875-125 Mg Tab), 1 TAB PO BID


   Discontinued Reason: COMPLETED


Azithromycin (Azithromycin Tablet), 1 PKG PO UD


   Discontinued Reason: COMPLETED





Total Time:


Total Time:


Total time spent was 38 minutes in preparing scripts, discharge planning with 

SWI and RN and preparing this discharge summary





Patient seen and examined on day of discharge.  No acute abnormal findings.





Justicifation of Admission Dx:


Justifications for Admission:


Justification of Admission Dx:  Yes











ALONDRA SOLIS MD                  Apr 17, 2022 14:09

## 2022-05-17 NOTE — EKG
[facility]

              [address] [place] [address]

Test Date:    2022               Test Time:    15:28:48

Pat Name:     ABHIJIT GILBERT       Department:   

Patient ID:   [id number]           Room:          

Gender:                               Technician:   

:          1940               Requested By: SHASHI HOUSTON

Order Number: [id number]           Reading MD:     

                                 Measurements

Intervals                              Axis          

Rate:         91                       P:            

ID:                                    QRS:          43

QRSD:         92                       T:            129

QT:           382                                    

QTc:          472                                    

                           Interpretive Statements

ATRIAL FLUTTER

T ABNORMALITY IN ANTEROLATERAL LEADS

PROLONGED QT

ABNORMAL ECG

RI6.01

No previous ECG available for comparison
[facility]

              [address] [place] [address]

Test Date:    2022               Test Time:    16:00:53

Pat Name:     ABHIJIT GILBERT       Department:   

Patient ID:   [id number]           Room:          

Gender:                               Technician:   

:          1940               Requested By: SHASHI HOUSTON

Order Number: [id number]           Reading MD:     

                                 Measurements

Intervals                              Axis          

Rate:         103                      P:            31

OH:           166                      QRS:          28

QRSD:         86                       T:            111

QT:           384                                    

QTc:          505                                    

                           Interpretive Statements

SINUS TACHYCARDIA

LEFT ATRIAL ABNORMALITY

QRS(T) CONTOUR ABNORMALITY

CONSIDER INFERIOR MYOCARDIAL DAMAGE

T ABNORMALITY IN ANTERIOR LEADS

ABNORMAL ECG

RI6.02

No previous ECG available for comparison
no
